# Patient Record
Sex: MALE | Race: WHITE | Employment: OTHER | URBAN - METROPOLITAN AREA
[De-identification: names, ages, dates, MRNs, and addresses within clinical notes are randomized per-mention and may not be internally consistent; named-entity substitution may affect disease eponyms.]

---

## 2017-01-04 ENCOUNTER — HOSPITAL ENCOUNTER (OUTPATIENT)
Dept: RESPIRATORY THERAPY | Age: 37
Discharge: HOME | End: 2017-01-04
Attending: NURSE PRACTITIONER

## 2017-01-04 DIAGNOSIS — J45.30 MILD PERSISTENT ASTHMA WITHOUT COMPLICATION: ICD-10-CM

## 2017-01-04 DIAGNOSIS — F17.200 CURRENT SMOKER: ICD-10-CM

## 2017-01-04 PROCEDURE — 94060 EVALUATION OF WHEEZING: CPT

## 2017-01-04 PROCEDURE — 94729 DIFFUSING CAPACITY: CPT

## 2017-01-04 PROCEDURE — 94726 PLETHYSMOGRAPHY LUNG VOLUMES: CPT

## 2017-01-10 ENCOUNTER — TELEPHONE (OUTPATIENT)
Dept: FAMILY MEDICINE CLINIC | Age: 37
End: 2017-01-10

## 2017-01-10 DIAGNOSIS — J45.30 MILD PERSISTENT ASTHMA WITHOUT COMPLICATION: ICD-10-CM

## 2017-01-10 DIAGNOSIS — J44.9 CHRONIC OBSTRUCTIVE PULMONARY DISEASE, UNSPECIFIED COPD TYPE (HCC): Primary | ICD-10-CM

## 2017-01-10 DIAGNOSIS — R06.02 SHORTNESS OF BREATH: ICD-10-CM

## 2017-01-10 DIAGNOSIS — F17.200 CURRENT SMOKER: ICD-10-CM

## 2017-01-10 RX ORDER — ALBUTEROL SULFATE 90 UG/1
2 AEROSOL, METERED RESPIRATORY (INHALATION)
Qty: 1 INHALER | Refills: 0 | Status: SHIPPED | COMMUNITY
Start: 2017-01-10 | End: 2017-01-26 | Stop reason: SDUPTHER

## 2017-01-10 RX ORDER — FLUTICASONE PROPIONATE AND SALMETEROL 250; 50 UG/1; UG/1
1 POWDER RESPIRATORY (INHALATION) EVERY 12 HOURS
Qty: 3 INHALER | Refills: 3 | Status: SHIPPED | COMMUNITY
Start: 2017-01-10 | End: 2018-10-02 | Stop reason: SDUPTHER

## 2017-01-10 RX ORDER — FLUTICASONE PROPIONATE AND SALMETEROL 250; 50 UG/1; UG/1
1 POWDER RESPIRATORY (INHALATION) EVERY 12 HOURS
Qty: 1 INHALER | Refills: 0 | Status: SHIPPED | COMMUNITY
Start: 2017-01-10 | End: 2017-01-26 | Stop reason: SDUPTHER

## 2017-01-10 RX ORDER — ALBUTEROL SULFATE 90 UG/1
2 AEROSOL, METERED RESPIRATORY (INHALATION)
Qty: 3 INHALER | Refills: 3 | Status: SHIPPED | COMMUNITY
Start: 2017-01-10 | End: 2018-10-02 | Stop reason: SDUPTHER

## 2017-01-10 NOTE — PROGRESS NOTES
Kaci Kirkland,  Please call patient with the following:  Pt has COPD. Lung disease. Encourage pt to quit smoking. Will order advair and proventil via [facility] as sample and as a program medication. Pt needs to come into the office to sign [facility] paperwork and to  samples.

## 2017-01-19 ENCOUNTER — TELEPHONE (OUTPATIENT)
Dept: FAMILY MEDICINE CLINIC | Age: 37
End: 2017-01-19

## 2017-01-26 NOTE — TELEPHONE ENCOUNTER
Medication: Ventolin HFA, dose: 2 puffs, how often: every 4 hours as needed, current number of medication days provided: 90, refill per application. Lot #: [id number], EXP.04/18. This medication was received and verified for the following 1. Correct Patient, 2. Correct Diagnosis, 3. Correct Drug, 4. Correct route, and no current allergy to medication. Medication: Lovaza 1gm, dose: 2 caps, how often: qd, current number of medication days provided: 90, refill per application. Lot #: [id number], EXP.01/18. This medication was received and verified for the following 1. Correct Patient, 2. Correct Diagnosis, 3. Correct Drug, 4. Correct route, and no current allergy to medication. Please contact patient to come  their medications.      Desirae Hartman, MSN,RN,[facility]

## 2017-03-29 ENCOUNTER — HOSPITAL ENCOUNTER (OUTPATIENT)
Dept: LAB | Age: 37
Discharge: HOME | End: 2017-03-29

## 2017-03-29 ENCOUNTER — LAB ONLY (OUTPATIENT)
Dept: FAMILY MEDICINE CLINIC | Age: 37
End: 2017-03-29

## 2017-03-29 DIAGNOSIS — Z91.14 NONCOMPLIANCE WITH MEDICATION REGIMEN: Primary | ICD-10-CM

## 2017-03-29 DIAGNOSIS — E78.5 DYSLIPIDEMIA: ICD-10-CM

## 2017-03-29 LAB
CHOLEST SERPL-MCNC: 188 MG/DL
HDLC SERPL-MCNC: 55 MG/DL (ref 40–60)
HDLC SERPL: 3.4 {RATIO} (ref 0–5)
LDLC SERPL CALC-MCNC: 104 MG/DL (ref 0–100)
LIPID PROFILE,FLP: ABNORMAL
TRIGL SERPL-MCNC: 145 MG/DL (ref ?–150)
VLDLC SERPL CALC-MCNC: 29 MG/DL

## 2017-03-29 PROCEDURE — 80061 LIPID PANEL: CPT | Performed by: NURSE PRACTITIONER

## 2017-03-29 NOTE — PROGRESS NOTES
Pt. Name,  and orders verified before specimen collection. Site prepped using aseptic procedure. 23 gauge butterfly was utilized to collect specimen. Labs drawn in RT arm x's 1 attempts. Site benign no bleeding noted. Band-Aid and 2x2 applied. Pt tolerated procedure well.

## 2017-04-04 ENCOUNTER — TELEPHONE (OUTPATIENT)
Dept: FAMILY MEDICINE CLINIC | Age: 37
End: 2017-04-04

## 2017-04-04 NOTE — TELEPHONE ENCOUNTER
Jana, please call pt and have him reschedule his appt for next week or the week after. He had labs done and they need to be reviewed with him. Please remind him of the No Show policy.  Thank you

## 2017-04-10 ENCOUNTER — TELEPHONE (OUTPATIENT)
Dept: FAMILY MEDICINE CLINIC | Age: 37
End: 2017-04-10

## 2017-04-12 ENCOUNTER — OFFICE VISIT (OUTPATIENT)
Dept: FAMILY MEDICINE CLINIC | Age: 37
End: 2017-04-12

## 2017-04-12 VITALS
DIASTOLIC BLOOD PRESSURE: 80 MMHG | HEART RATE: 75 BPM | BODY MASS INDEX: 20.38 KG/M2 | SYSTOLIC BLOOD PRESSURE: 126 MMHG | RESPIRATION RATE: 16 BRPM | TEMPERATURE: 98.3 F | OXYGEN SATURATION: 98 % | WEIGHT: 145.6 LBS | HEIGHT: 71 IN

## 2017-04-12 DIAGNOSIS — M79.642 PAIN IN LEFT HAND: Primary | ICD-10-CM

## 2017-04-12 DIAGNOSIS — K02.9 DENTAL CARIES: ICD-10-CM

## 2017-04-12 DIAGNOSIS — R06.02 SHORTNESS OF BREATH: ICD-10-CM

## 2017-04-12 DIAGNOSIS — K04.7 TOOTH ABSCESS: ICD-10-CM

## 2017-04-12 DIAGNOSIS — E78.1 HYPERTRIGLYCERIDEMIA: ICD-10-CM

## 2017-04-12 DIAGNOSIS — F17.200 CURRENT SMOKER: ICD-10-CM

## 2017-04-12 RX ORDER — OMEGA-3-ACID ETHYL ESTERS 1 G/1
1 CAPSULE, LIQUID FILLED ORAL
Qty: 90 CAP | Refills: 3 | Status: SHIPPED | COMMUNITY
Start: 2017-04-12 | End: 2019-02-05

## 2017-04-12 RX ORDER — AMOXICILLIN 500 MG/1
500 CAPSULE ORAL 2 TIMES DAILY
Qty: 20 CAP | Refills: 0 | Status: SHIPPED | OUTPATIENT
Start: 2017-04-12 | End: 2017-04-22

## 2017-04-12 NOTE — MR AVS SNAPSHOT
Visit Information Date & Time Provider Department Dept. Phone Encounter #  
 4/12/2017 10:30 AM Vale Diaz NP 1997 [facility] [id number] Follow-up Instructions Return in about 3 months (around 7/12/2017). Upcoming Health Maintenance Date Due Pneumococcal 19-64 Medium Risk (1 of 1 - PPSV23) 12/7/1999 DTaP/Tdap/Td series (1 - Tdap) 12/7/2001 INFLUENZA AGE 9 TO ADULT 8/1/2016 Allergies as of 4/12/2017  Review Complete On: 4/12/2017 By: Ana Remy No Known Allergies Current Immunizations  Never Reviewed No immunizations on file. Not reviewed this visit You Were Diagnosed With   
  
 Codes Comments Pain in left hand    -  Primary ICD-10-CM: J09.643 ICD-9-CM: 729.5 Hypertriglyceridemia     ICD-10-CM: E78.1 ICD-9-CM: 272.1 Tooth abscess     ICD-10-CM: K04.7 ICD-9-CM: 522.5 Vitals BP Pulse Temp Resp Height(growth percentile) Weight(growth percentile) 126/80 75 98.3 °F (36.8 °C) 16 5' 11\" (1.803 m) 145 lb 9.6 oz (66 kg) SpO2 BMI Smoking Status 98% 20.31 kg/m2 Current Every Day Smoker BMI and BSA Data Body Mass Index Body Surface Area  
 20.31 kg/m 2 1.82 m 2 Preferred Pharmacy Pharmacy Name Phone [facility] - [address] [phone] Your Updated Medication List  
  
   
This list is accurate as of: 4/12/17 11:18 AM.  Always use your most recent med list.  
  
  
  
  
 albuterol 90 mcg/actuation inhaler Commonly known as:  PROVENTIL HFA, VENTOLIN HFA, PROAIR HFA Take 2 Puffs by inhalation every four (4) hours as needed for Wheezing or Shortness of Breath. amoxicillin 500 mg capsule Commonly known as:  AMOXIL Take 1 Cap by mouth two (2) times a day for 10 days. Tooth abscess  
  
 fluticasone-salmeterol 250-50 mcg/dose diskus inhaler Commonly known as:  ADVAIR  
 Take 1 Puff by inhalation every twelve (12) hours. omega-3 acid ethyl esters 1 gram capsule Commonly known as:  Desi Grant Take 1 Cap by mouth daily (with breakfast). For triglycerides VITAMIN D3 1,000 unit Cap Generic drug:  cholecalciferol Take  by mouth. Prescriptions Printed Refills  
 omega-3 acid ethyl esters (LOVAZA) 1 gram capsule 3 Sig: Take 1 Cap by mouth daily (with breakfast). For triglycerides Class: Program  
 Route: Oral  
  
Prescriptions Sent to Mail Order Refills  
 omega-3 acid ethyl esters (LOVAZA) 1 gram capsule 3 Sig: Take 1 Cap by mouth daily (with breakfast). For triglycerides Class: Program  
 Pharmacy: [address] [place] [address] [facility]. Ph #: [phone] Route: Oral  
  
Prescriptions Sent to Pharmacy Refills  
 omega-3 acid ethyl esters (LOVAZA) 1 gram capsule 3 Sig: Take 1 Cap by mouth daily (with breakfast). For triglycerides Class: Program  
 Pharmacy: [address] [place] [address] [facility]. Ph #: [phone] Route: Oral  
 amoxicillin (AMOXIL) 500 mg capsule 0 Sig: Take 1 Cap by mouth two (2) times a day for 10 days. Tooth abscess Class: Normal  
 Pharmacy: [address] [place] [address] [place], [facility]. Ph #: [phone] Route: Oral  
  
Follow-up Instructions Return in about 3 months (around 7/12/2017). To-Do List   
 04/12/2017 Imaging:  XR HAND LT MIN 3 V Patient Instructions [facility] reminders! Foundation Operating Hours: These may change without notice. Mon- Wed 7am to 5pm. Closed for lunch 12-1pm 
Thurs 7am to 12pm 
Fridays closed **In case of inclement weather (snow and ice) please do not try to come into the office for your appointment. Please call in and you will not be held as a [employer] \" SAFETY FIRST!!** 
 
NO SHOW POLICY ~ If a patient has 3 no shows for an appointment with the Provider, Mental Health Provider, or the Nurse Navigator, they will be discharged from the practice for 12 months. Medication ordering will also be suspended. If the patient is discharged from the [facility], they can go to the [place] [address] where they can be seen for the primary needs plus obtain the same types of medications as they receive at the [facility]. To avoid being discharged, the patient must call the office at [phone] 24 hours prior to their appointment if they need to cancel, arrive to their appointments on time and come to all scheduled appointments. If the patient is discharged from the practice, they can apply to be re-established after 12 months. Lab work:  Unless you are instructed differently, please return to the office between the hours of 7 am and 10:30 am Monday through Thursday to have your labs drawn one week before your next scheduled PROVIDER visit. If you do not have an appointment to follow up on these results, please make one or plan to call the office if you do not hear from us to get the results. No news does not mean good news. Medications: If your medications are new or have changed, and you get your medications from the [address] [facility]), you MUST talk to the pharmacy staff to sign the new prescription applications. If you don't sign the applications we cannot get the medications for you. It usually takes 6-8 weeks for your medications to arrive. The Pharmacy staff will call you when your medications are available. You will have 30 days to come in and  your medications. If you don't  your medicines within those 30 days, those medicines may be placed on the self as samples and you will have to start all over again by completing the applications and waiting the 6-8 weeks for your medicines to arrive.  
 
 
 
The [facility] or [facility] will assist you with your medications if available but not all medications are available so some, not all medications, may be obtained through local pharmacies such as [facility] that has a large $4 list. We will work hard to get the best medications for you that you can also afford. Foot Care: [facility] through [facility] ([address]) Every second Tuesday of the month (except for holidays and election days) from 9am to 1 pm. The services provided by these ministry volunteers are free of charge with the option to donate. They will inspect your feet thoroughly, soak them for 10 minutes, cut and file your nails. They care for diabetics as well. Keep in mind this service is free and will be on a first come first serve basis. Bad teeth? Ask about the Dental Bus to get you in front of a local dentist. The bus leaves every other Wednesday for those on the list. (Ask about availability as these appointments are limited) DIABETES: Do you have uncontrolled diabetes or you just want to learn more about your diabetes? Schedule with the Nurse navigator for our new 5-week Diabetes program. You will learn how to properly manage your diabetes: nutrition, exercise, medication therapy. Eye exams for Diabetics. Please let us know so we can add you to the list to see the eye doctor at [facility]. These appointments are limited. You will receive a free eye exam and free glasses if needed. Unfortunately, if you are not a diabetic, we do not have a free service for eye exams for you (yet!). We do have information on where to go to get a huge discount on eye exams and glasses. Sick visits: If you are sick and it is not an emergency call the office to see if you can schedule an appointment. Charges and cost items from the Foundation:  Most of our orders are covered by [employer] but there ARE SOME CHARGES for items such as radiology interpretations and anesthesiology during procedures and surgeries.   Please make sure you have contacted the Advanced Patient Advocacy (APA) group to check on your payment options: [email or web address]. APA is available Mon - Fri 8-4pm at [facility] on the first floor by the information desk. Their number is [phone]. It is important that you are screened in order to qualify for assistance and to avoid huge medical charges. The [facility] is not responsible for ANY charges you may accrue regardless of who ordered the medication, procedure, treatment or test. If you go to the Emergency Room, you WILL be charged! Behavior and emotional issues! It is stressful to be sick, have an illness, take medications, not have a job, not have medical insurance, have family issues or just getting older! Schedule an appointment with our mental health provider. She is in the office Mondays and Wednesdays from 8am to [place] [address] can also contact the following: The national suicide hotline (8-442-775-LRWF or 5-537.702.8294) [address] 
[phone] Community Services Board (CSB) [facility] [address] [facility], [address] 
[phone] Drug and Alcohol Addiction Issues! It is hard to stop a poor habit but there is help out there. Please feel free to attend any other the following support groups to help you kick the habit or go to [facility] Emergency Department to be evaluated by the psychiatric team. Never give up!! AlAnon meetings: Koby dooley K[place], [place] MONDAY 7:30 PM JUST FOR TODAY [employer] [facility] [place] WEDNESDAY 10:30 AM [employer] Bo [place] [employer] [facility], ROOM [address] [place] [address]  
 
[place] WEDNESDAY 8:00 PM Kevin Neely 1997 [place] THURSDAY 8:00 PM [employer]EEP [employer]PLE [employer] Bo [facility] [address] [facility] 8:00 PM LET IT BEGIN WITH ME [employer] Bo CANO [facility] [address] [place] 6;30 PM [place] PARENTS [employer] Parents OAK [facility] [address]  [place] MONDAY 10:30  [place] [address] [place] SATURDAY 8:00 PM SPENSER [facility] SATURDAY NIGHT [employer] [place] [address] [place] MONDAY 7:00 PM MONDAY NIGHT AFG Al-Anon JUAN [facility] [address]  
 
[facility] PM [employer] Al-[facility]on [facility] [address]  Amoxicillin (By mouth) Amoxicillin (w-ogp-w-FREDDIE-in) Treats infections or stomach ulcers. This medicine is a penicillin antibiotic. Brand Name(s):Amoxicot, Amoxil, Amoxil Pediatric, Moxatag, Omeclamox-Nathaniel, Prevpac, Trimox, Triple Therapy There may be other brand names for this medicine. When This Medicine Should Not Be Used: This medicine is not right for everyone. You should not use it if you had an allergic reaction to amoxicillin, any type of penicillin, or a cephalosporin antibiotic. How to Use This Medicine:  
Capsule, Liquid, Tablet, Chewable Tablet, Long Acting Tablet · Your doctor will tell you how much medicine to use. Do not use more than directed. · Chewable tablet: You must chew the tablet before you swallow it. You may crush the tablet and mix the medicine with a small amount of food to make it easier to swallow. · Oral liquid: Shake well just before each use. · Measure the oral liquid medicine with a marked measuring spoon, oral syringe, or medicine cup. You may mix the oral liquid with a baby formula, milk, fruit juice, water, ginger ale, or another cold drink. Be sure your child drinks all of the mixture right away. · Tablet for suspension: Place the tablet in a small drinking glass, and add 2 teaspoons of water. Do not use any other liquid. Gently stir or swirl the water in the glass until the tablet is completely dissolved. Drink all of this mixture right away. Add more water to the glass and drink all of it to make sure you get all of the medicine. Do not chew or swallow the tablet for suspension. · Take all of the medicine in your prescription to clear up your infection, even if you feel better after the first few doses. · Take a dose as soon as you remember. If it is almost time for your next dose, wait until then and take a regular dose. Do not take extra medicine to make up for a missed dose. · Store the tablets, capsules, and tablets for suspension at room temperature, away from heat, moisture, and direct light. · Store the oral liquid in the refrigerator. Do not freeze. Throw away any unused medicine after 14 days. Drugs and Foods to Avoid: Ask your doctor or pharmacist before using any other medicine, including over-the-counter medicines, vitamins, and herbal products. · Some medicines can affect how amoxicillin works. Tell your doctor if you are also using any of the following: ¨ Allopurinol ¨ Probenecid ¨ Birth control pills ¨ A blood thinner Warnings While Using This Medicine: · Tell your doctor if you are pregnant or breastfeeding, or if you have kidney disease, allergies, or a condition called phenylketonuria (PKU). Tell your doctor if you are on dialysis. · This medicine can cause diarrhea. Call your doctor if the diarrhea becomes severe, does not stop, or is bloody. Do not take any medicine to stop diarrhea until you have talked to your doctor. Diarrhea can occur 2 months or more after you stop taking this medicine. · Tell any doctor or dentist who treats you that you are using this medicine. This medicine may affect certain medical test results. · Call your doctor if your symptoms do not improve or if they get worse. · Use this medicine to treat only the infection your doctor has prescribed it for.  Do not use this medicine for any infection or condition that has not been checked by a doctor. This medicine will not treat the flu or the common cold. · Keep all medicine out of the reach of children. Never share your medicine with anyone. Possible Side Effects While Using This Medicine:  
Call your doctor right away if you notice any of these side effects: · Allergic reaction: Itching or hives, swelling in your face or hands, swelling or tingling in your mouth or throat, chest tightness, trouble breathing · Blistering, peeling, or red skin rash · Diarrhea that may contain blood, stomach cramps, fever If you notice these less serious side effects, talk with your doctor: · Mild diarrhea, nausea, or vomiting · Mild skin rash If you notice other side effects that you think are caused by this medicine, tell your doctor. Call your doctor for medical advice about side effects. You may report side effects to FDA at 5-502-FDA-2589 © 2016 [address] is for End User's use only and may not be sold, redistributed or otherwise used for commercial purposes. The above information is an  only. It is not intended as medical advice for individual conditions or treatments. Talk to your doctor, nurse or pharmacist before following any medical regimen to see if it is safe and effective for you. Amoxicillin (By mouth) Amoxicillin (l-ugl-b-FREDDIE-in) Treats infections or stomach ulcers. This medicine is a penicillin antibiotic. Brand Name(s):Amoxicot, Amoxil, Amoxil Pediatric, Moxatag, Omeclamox-Nathaniel, Prevpac, Trimox, Triple Therapy There may be other brand names for this medicine. When This Medicine Should Not Be Used: This medicine is not right for everyone. You should not use it if you had an allergic reaction to amoxicillin, any type of penicillin, or a cephalosporin antibiotic. How to Use This Medicine:  
Capsule, Liquid, Tablet, Chewable Tablet, Long Acting Tablet · Your doctor will tell you how much medicine to use. Do not use more than directed. · Chewable tablet: You must chew the tablet before you swallow it. You may crush the tablet and mix the medicine with a small amount of food to make it easier to swallow. · Oral liquid: Shake well just before each use. · Measure the oral liquid medicine with a marked measuring spoon, oral syringe, or medicine cup. You may mix the oral liquid with a baby formula, milk, fruit juice, water, ginger ale, or another cold drink. Be sure your child drinks all of the mixture right away. · Tablet for suspension: Place the tablet in a small drinking glass, and add 2 teaspoons of water. Do not use any other liquid. Gently stir or swirl the water in the glass until the tablet is completely dissolved. Drink all of this mixture right away. Add more water to the glass and drink all of it to make sure you get all of the medicine. Do not chew or swallow the tablet for suspension. · Take all of the medicine in your prescription to clear up your infection, even if you feel better after the first few doses. · Take a dose as soon as you remember. If it is almost time for your next dose, wait until then and take a regular dose. Do not take extra medicine to make up for a missed dose. · Store the tablets, capsules, and tablets for suspension at room temperature, away from heat, moisture, and direct light. · Store the oral liquid in the refrigerator. Do not freeze. Throw away any unused medicine after 14 days. Drugs and Foods to Avoid: Ask your doctor or pharmacist before using any other medicine, including over-the-counter medicines, vitamins, and herbal products. · Some medicines can affect how amoxicillin works. Tell your doctor if you are also using any of the following: ¨ Allopurinol ¨ Probenecid ¨ Birth control pills ¨ A blood thinner Warnings While Using This Medicine: · Tell your doctor if you are pregnant or breastfeeding, or if you have kidney disease, allergies, or a condition called phenylketonuria (PKU). Tell your doctor if you are on dialysis. · This medicine can cause diarrhea. Call your doctor if the diarrhea becomes severe, does not stop, or is bloody. Do not take any medicine to stop diarrhea until you have talked to your doctor. Diarrhea can occur 2 months or more after you stop taking this medicine. · Tell any doctor or dentist who treats you that you are using this medicine. This medicine may affect certain medical test results. · Call your doctor if your symptoms do not improve or if they get worse. · Use this medicine to treat only the infection your doctor has prescribed it for. Do not use this medicine for any infection or condition that has not been checked by a doctor. This medicine will not treat the flu or the common cold. · Keep all medicine out of the reach of children. Never share your medicine with anyone. Possible Side Effects While Using This Medicine:  
Call your doctor right away if you notice any of these side effects: · Allergic reaction: Itching or hives, swelling in your face or hands, swelling or tingling in your mouth or throat, chest tightness, trouble breathing · Blistering, peeling, or red skin rash · Diarrhea that may contain blood, stomach cramps, fever If you notice these less serious side effects, talk with your doctor: · Mild diarrhea, nausea, or vomiting · Mild skin rash If you notice other side effects that you think are caused by this medicine, tell your doctor. Call your doctor for medical advice about side effects. You may report side effects to FDA at 5-566-FDA-7729 © 2016 [address] is for End User's use only and may not be sold, redistributed or otherwise used for commercial purposes. The above information is an  only.  It is not intended as medical advice for individual conditions or treatments. Talk to your doctor, nurse or pharmacist before following any medical regimen to see if it is safe and effective for you. Abscessed Tooth: Care Instructions Your Care Instructions An abscessed tooth is a tooth that has a pocket of pus in the tissues around it. Pus forms when the body tries to fight an infection caused by bacteria. If the pus cannot drain, it forms an abscess. An abscessed tooth can cause red, swollen gums and throbbing pain, especially when you chew. You may have a bad taste in your mouth and a fever, and your jaw may swell. Damage to the tooth, untreated tooth decay, or gum disease can cause an abscessed tooth. An abscessed tooth needs to be treated by a dental professional right away. If it is not treated, the infection could spread to other parts of your body. Your dentist will give you antibiotics to stop the infection. He or she may make a hole in the tooth or cut open (haydee) the abscess inside your mouth so that the infection can drain, which should relieve your pain. You may need to have a root canal treatment, which tries to save your tooth by taking out the infected pulp and replacing it with a healing medicine and/or a filling. If these treatments do not work, your tooth may have to be removed. Follow-up care is a key part of your treatment and safety. Be sure to make and go to all appointments, and call your doctor if you are having problems. It's also a good idea to know your test results and keep a list of the medicines you take. How can you care for yourself at home? · Reduce pain and swelling in your face and jaw by putting ice or a cold pack on the outside of your cheek for 10 to 20 minutes at a time. Put a thin cloth between the ice and your skin. · Take pain medicines exactly as directed. ¨ If the doctor gave you a prescription medicine for pain, take it as prescribed. ¨ If you are not taking a prescription pain medicine, ask your doctor if you can take an over-the-counter medicine. · Take your antibiotics as directed. Do not stop taking them just because you feel better. You need to take the full course of antibiotics. To prevent tooth abscess · Brush and floss every day, and have regular dental checkups. · Eat a healthy diet, and avoid sugary foods and drinks. · Do not smoke, use e-cigarettes with nicotine, or use spit tobacco. Tobacco and nicotine slow your ability to heal. Tobacco also increases your risk for gum disease and cancer of the mouth and throat. If you need help quitting, talk to your doctor about stop-smoking programs and medicines. These can increase your chances of quitting for good. When should you call for help? Call 911 anytime you think you may need emergency care. For example, call if: 
· You have trouble breathing. Call your doctor now or seek immediate medical care if: 
· You are dizzy or lightheaded, or you feel like you may faint. · You have a new or higher fever. · You have swelling, redness, or pain that spreads or gets worse. · You have pus coming from the tooth area. · You have an earache or pain behind your ear. · You have a fever with a stiff neck or a severe headache. · You are sensitive to light or feel very sleepy or confused. Watch closely for changes in your health, and be sure to contact your doctor if: 
· You do not get better as expected. Where can you learn more? Go to http://neptali-abhinav.info/. Enter G837 in the search box to learn more about \"Abscessed Tooth: Care Instructions. \" Current as of: September 19, 2016 Content Version: 11.2 © 8017-4592 [employer]. Care instructions adapted under license by [employer] (which disclaims liability or warranty for this information).  If you have questions about a medical condition or this instruction, always ask your healthcare professional. [place] [address] any warranty or liability for your use of this information. Introducing [facility] SERVICES! [employer] introduces [employer] patient portal. Now you can access parts of your medical record, email your doctor's office, and request medication refills online. 1. In your internet browser, go to [email or web address] 2. Click on the First Time User? Click Here link in the Sign In box. You will see the New Member Sign Up page. 3. Enter your [employer] Access Code exactly as it appears below. You will not need to use this code after youve completed the sign-up process. If you do not sign up before the expiration date, you must request a new code. · [employer] Access Code: [email or web address] Expires: 7/11/2017 11:18 AM 
 
4. Enter the last four digits of your Social Security Number (xxxx) and Date of Birth (mm/dd/yyyy) as indicated and click Submit. You will be taken to the next sign-up page. 5. Create a [employer] ID. This will be your [employer] login ID and cannot be changed, so think of one that is secure and easy to remember. 6. Create a [employer] password. You can change your password at any time. 7. Enter your Password Reset Question and Answer. This can be used at a later time if you forget your password. 8. Enter your e-mail address. You will receive e-mail notification when new information is available in [address] 9. Click Sign Up. You can now view and download portions of your medical record. 10. Click the Download Summary menu link to download a portable copy of your medical information. If you have questions, please visit the Frequently Asked Questions section of the [employer] website. Remember, [employer] is NOT to be used for urgent needs. For medical emergencies, dial 911. Now available from your iPhone and Android! Please provide this summary of care documentation to your next provider. Your primary care clinician is listed as Lionel Galicia. If you have any questions after today's visit, please call [phone].

## 2017-04-12 NOTE — PATIENT INSTRUCTIONS
[facility] reminders! Foundation Operating Hours: These may change without notice. Mon- Wed 7am to 5pm. Closed for lunch 12-1pm  Thurs 7am to 12pm  Fridays closed     **In case of inclement weather (snow and ice) please do not try to come into the office for your appointment. Please call in and you will not be held as a [employer]. \" SAFETY FIRST!!**    NO SHOW POLICY ~ If a patient has 3 no shows for an appointment with the Provider, Mental Health Provider, or the Nurse Navigator, they will be discharged from the practice for 12 months. Medication ordering will also be suspended. If the patient is discharged from the [place], they can go to the [place] [address] where they can be seen for the primary needs plus obtain the same types of medications as they receive at the [place]. To avoid being discharged, the patient must call the office at [phone] 24 hours prior to their appointment if they need to cancel, arrive to their appointments on time and come to all scheduled appointments. If the patient is discharged from the [facility], they can apply to be re-established after 12 months. Lab work:  Unless you are instructed differently, please return to the office between the hours of 7 am and 10:30 am Monday through Thursday to have your labs drawn one week before your next scheduled PROVIDER visit. If you do not have an appointment to follow up on these results, please make one or plan to call the office if you do not hear from us to get the results. No news does not mean good news. Medications: If your medications are new or have changed, and you get your medications from the [facility] [address] [place] [address]), you MUST talk to the pharmacy staff to sign the new prescription applications. If you don't sign the applications we cannot get the medications for you. It usually takes 6-8 weeks for your medications to arrive. The Pharmacy staff will call you when your medications are available.  You will have 30 days to come in and  your medications. If you don't  your medicines within those 30 days, those medicines may be placed on the self as samples and you will have to start all over again by completing the applications and waiting the 6-8 weeks for your medicines to arrive. The [facility] or [facility] will assist you with your medications if available but not all medications are available so some, not all medications, may be obtained through local pharmacies such as [facility] that has a large $4 list. We will work hard to get the best medications for you that you can also afford. Foot Care: Local [facility] through [facility] ([address] [facility])  Every second Tuesday of the month (except for holidays and election days) from 9am to 1 pm. The services provided by these ministry volunteers are free of charge with the option to donate. They will inspect your feet thoroughly, soak them for 10 minutes, cut and file your nails. They care for diabetics as well. Keep in mind this service is free and will be on a first come first serve basis. Bad teeth? Ask about the Dental Bus to get you in front of a local dentist. The bus leaves every other Wednesday for those on the list. (Ask about availability as these appointments are limited)    DIABETES: Do you have uncontrolled diabetes or you just want to learn more about your diabetes? Schedule with the Nurse navigator for our new 5-week Diabetes program. You will learn how to properly manage your diabetes: nutrition, exercise, medication therapy. Eye exams for Diabetics. Please let us know so we can add you to the list to see the eye doctor at [facility]. These appointments are limited. You will receive a free eye exam and free glasses if needed. Unfortunately, if you are not a diabetic, we do not have a free service for eye exams for you (yet!). We do have information on where to go to get a huge discount on eye exams and glasses.     Ángel Merino visits: If you are sick and it is not an emergency call the office to see if you can schedule an appointment. Charges and cost items from the Foundation:  Most of our orders are covered by [address] but there ARE SOME CHARGES for items such as radiology interpretations and anesthesiology during procedures and surgeries. Please make sure you have contacted the Advanced Patient Advocacy (APA) group to check on your payment options: [email or web address]. APA is available Mon - Fri 8-4pm at DR. [facility] on the first floor by the information desk. Their number is [phone]. It is important that you are screened in order to qualify for assistance and to avoid huge medical charges. The [facility] is not responsible for ANY charges you may accrue regardless of who ordered the medication, procedure, treatment or test. If you go to the Emergency Room, you WILL be charged! Behavior and emotional issues! It is stressful to be sick, have an illness, take medications, not have a job, not have medical insurance, have family issues or just getting older! Schedule an appointment with our mental health provider. She is in the office Mondays and Wednesdays from 8am to [address] [facility] can also contact the following: The national suicide hotline (7-227-547-WCXS or 0-880.259.2271)    [address], [address] [place] [address] [facility]   [phone]    Drug and Alcohol Addiction Issues! It is hard to stop a poor habit but there is help out there. Please feel free to attend any other the following support groups to help you kick the habit or go to [facility] to be evaluated by the psychiatric team. Never give up!!     Esther meetings: Kaitlin Orellana, [address] [facility] 7:30 PM JUST FOR TODAY ABIODUN Soriano [address]     [place] WEDNESDAY 10:30 AM [employer] [place] [employer] [facility], ROOM [address] [place] [address]     [place] WEDNESDAY 8:00 PM [employer] [address] [facility] 8:00 PM [employer]PLE [employer]on [facility] 1 [employer] POWERS     [facility]SD[facility] 8:00 PM LET IT BEGIN WITH ME [employer] [facility] [address] [facility] FRIDAY 6;30 PM [place] PARENTS [employer] [facility] [address]      [place] MONDAY 10:30 AM [employer] [facility] [address] [facility] SATURDAY 8:00 PM [facility] SATURDAY NIGHT [employer] Al-[employer]on [facility] [address] [facility] MONDAY 7:00 PM MONDAY NIGHT [employer] [facility] [address]     [place] WEDNESDAY 8:00 PM [employer]on [facility] [address]         Amoxicillin (By mouth)   Amoxicillin (p-uzj-o-FREDDIE-in)  Treats infections or stomach ulcers. This medicine is a penicillin antibiotic. Brand Name(s):Amoxicot, Amoxil, Amoxil Pediatric, Moxatag, Omeclamox-Nathaniel, Prevpac, Trimox, Triple Therapy   There may be other brand names for this medicine. When This Medicine Should Not Be Used: This medicine is not right for everyone. You should not use it if you had an allergic reaction to amoxicillin, any type of penicillin, or a cephalosporin antibiotic. How to Use This Medicine:   Capsule, Liquid, Tablet, Chewable Tablet, Long Acting Tablet  · Your doctor will tell you how much medicine to use. Do not use more than directed. · Chewable tablet: You must chew the tablet before you swallow it. You may crush the tablet and mix the medicine with a small amount of food to make it easier to swallow. · Oral liquid: Shake well just before each use.   · Measure the oral liquid medicine with a marked measuring spoon, oral syringe, or medicine cup. You may mix the oral liquid with a baby formula, milk, fruit juice, water, ginger ale, or another cold drink. Be sure your child drinks all of the mixture right away. · Tablet for suspension: Place the tablet in a small drinking glass, and add 2 teaspoons of water. Do not use any other liquid. Gently stir or swirl the water in the glass until the tablet is completely dissolved. Drink all of this mixture right away. Add more water to the glass and drink all of it to make sure you get all of the medicine. Do not chew or swallow the tablet for suspension. · Take all of the medicine in your prescription to clear up your infection, even if you feel better after the first few doses. · Take a dose as soon as you remember. If it is almost time for your next dose, wait until then and take a regular dose. Do not take extra medicine to make up for a missed dose. · Store the tablets, capsules, and tablets for suspension at room temperature, away from heat, moisture, and direct light. · Store the oral liquid in the refrigerator. Do not freeze. Throw away any unused medicine after 14 days. Drugs and Foods to Avoid:   Ask your doctor or pharmacist before using any other medicine, including over-the-counter medicines, vitamins, and herbal products. · Some medicines can affect how amoxicillin works. Tell your doctor if you are also using any of the following:   ¨ Allopurinol  ¨ Probenecid  ¨ Birth control pills  ¨ A blood thinner  Warnings While Using This Medicine:   · Tell your doctor if you are pregnant or breastfeeding, or if you have kidney disease, allergies, or a condition called phenylketonuria (PKU). Tell your doctor if you are on dialysis. · This medicine can cause diarrhea. Call your doctor if the diarrhea becomes severe, does not stop, or is bloody. Do not take any medicine to stop diarrhea until you have talked to your doctor.  Diarrhea can occur 2 months or more after you stop taking this medicine. · Tell any doctor or dentist who treats you that you are using this medicine. This medicine may affect certain medical test results. · Call your doctor if your symptoms do not improve or if they get worse. · Use this medicine to treat only the infection your doctor has prescribed it for. Do not use this medicine for any infection or condition that has not been checked by a doctor. This medicine will not treat the flu or the common cold. · Keep all medicine out of the reach of children. Never share your medicine with anyone. Possible Side Effects While Using This Medicine:   Call your doctor right away if you notice any of these side effects:  · Allergic reaction: Itching or hives, swelling in your face or hands, swelling or tingling in your mouth or throat, chest tightness, trouble breathing  · Blistering, peeling, or red skin rash  · Diarrhea that may contain blood, stomach cramps, fever  If you notice these less serious side effects, talk with your doctor:   · Mild diarrhea, nausea, or vomiting  · Mild skin rash  If you notice other side effects that you think are caused by this medicine, tell your doctor. Call your doctor for medical advice about side effects. You may report side effects to FDA at 1-090-FDA-6808  © 2016 [address] is for End User's use only and may not be sold, redistributed or otherwise used for commercial purposes. The above information is an  only. It is not intended as medical advice for individual conditions or treatments. Talk to your doctor, nurse or pharmacist before following any medical regimen to see if it is safe and effective for you. Amoxicillin (By mouth)   Amoxicillin (s-rio-g-FREDDIE-in)  Treats infections or stomach ulcers. This medicine is a penicillin antibiotic.    Brand Name(s):Amoxicot, Amoxil, Amoxil Pediatric, Moxatag, Omeclamox-Nathaniel, Prevpac, Trimox, Triple Therapy   There may be other brand names for this medicine. When This Medicine Should Not Be Used: This medicine is not right for everyone. You should not use it if you had an allergic reaction to amoxicillin, any type of penicillin, or a cephalosporin antibiotic. How to Use This Medicine:   Capsule, Liquid, Tablet, Chewable Tablet, Long Acting Tablet  · Your doctor will tell you how much medicine to use. Do not use more than directed. · Chewable tablet: You must chew the tablet before you swallow it. You may crush the tablet and mix the medicine with a small amount of food to make it easier to swallow. · Oral liquid: Shake well just before each use. · Measure the oral liquid medicine with a marked measuring spoon, oral syringe, or medicine cup. You may mix the oral liquid with a baby formula, milk, fruit juice, water, ginger ale, or another cold drink. Be sure your child drinks all of the mixture right away. · Tablet for suspension: Place the tablet in a small drinking glass, and add 2 teaspoons of water. Do not use any other liquid. Gently stir or swirl the water in the glass until the tablet is completely dissolved. Drink all of this mixture right away. Add more water to the glass and drink all of it to make sure you get all of the medicine. Do not chew or swallow the tablet for suspension. · Take all of the medicine in your prescription to clear up your infection, even if you feel better after the first few doses. · Take a dose as soon as you remember. If it is almost time for your next dose, wait until then and take a regular dose. Do not take extra medicine to make up for a missed dose. · Store the tablets, capsules, and tablets for suspension at room temperature, away from heat, moisture, and direct light. · Store the oral liquid in the refrigerator. Do not freeze. Throw away any unused medicine after 14 days.   Drugs and Foods to Avoid:   Ask your doctor or pharmacist before using any other medicine, including over-the-counter medicines, vitamins, and herbal products. · Some medicines can affect how amoxicillin works. Tell your doctor if you are also using any of the following:   ¨ Allopurinol  ¨ Probenecid  ¨ Birth control pills  ¨ A blood thinner  Warnings While Using This Medicine:   · Tell your doctor if you are pregnant or breastfeeding, or if you have kidney disease, allergies, or a condition called phenylketonuria (PKU). Tell your doctor if you are on dialysis. · This medicine can cause diarrhea. Call your doctor if the diarrhea becomes severe, does not stop, or is bloody. Do not take any medicine to stop diarrhea until you have talked to your doctor. Diarrhea can occur 2 months or more after you stop taking this medicine. · Tell any doctor or dentist who treats you that you are using this medicine. This medicine may affect certain medical test results. · Call your doctor if your symptoms do not improve or if they get worse. · Use this medicine to treat only the infection your doctor has prescribed it for. Do not use this medicine for any infection or condition that has not been checked by a doctor. This medicine will not treat the flu or the common cold. · Keep all medicine out of the reach of children. Never share your medicine with anyone. Possible Side Effects While Using This Medicine:   Call your doctor right away if you notice any of these side effects:  · Allergic reaction: Itching or hives, swelling in your face or hands, swelling or tingling in your mouth or throat, chest tightness, trouble breathing  · Blistering, peeling, or red skin rash  · Diarrhea that may contain blood, stomach cramps, fever  If you notice these less serious side effects, talk with your doctor:   · Mild diarrhea, nausea, or vomiting  · Mild skin rash  If you notice other side effects that you think are caused by this medicine, tell your doctor. Call your doctor for medical advice about side effects.  You may report side effects to FDA at 1-800-FDA-1088  © 2016 [address] is for End User's use only and may not be sold, redistributed or otherwise used for commercial purposes. The above information is an  only. It is not intended as medical advice for individual conditions or treatments. Talk to your doctor, nurse or pharmacist before following any medical regimen to see if it is safe and effective for you. Abscessed Tooth: Care Instructions  Your Care Instructions    An abscessed tooth is a tooth that has a pocket of pus in the tissues around it. Pus forms when the body tries to fight an infection caused by bacteria. If the pus cannot drain, it forms an abscess. An abscessed tooth can cause red, swollen gums and throbbing pain, especially when you chew. You may have a bad taste in your mouth and a fever, and your jaw may swell. Damage to the tooth, untreated tooth decay, or gum disease can cause an abscessed tooth. An abscessed tooth needs to be treated by a dental professional right away. If it is not treated, the infection could spread to other parts of your body. Your dentist will give you antibiotics to stop the infection. He or she may make a hole in the tooth or cut open (haydee) the abscess inside your mouth so that the infection can drain, which should relieve your pain. You may need to have a root canal treatment, which tries to save your tooth by taking out the infected pulp and replacing it with a healing medicine and/or a filling. If these treatments do not work, your tooth may have to be removed. Follow-up care is a key part of your treatment and safety. Be sure to make and go to all appointments, and call your doctor if you are having problems. It's also a good idea to know your test results and keep a list of the medicines you take. How can you care for yourself at home?   · Reduce pain and swelling in your face and jaw by putting ice or a cold pack on the outside of your cheek for 10 to 20 minutes at a time. Put a thin cloth between the ice and your skin. · Take pain medicines exactly as directed. ¨ If the doctor gave you a prescription medicine for pain, take it as prescribed. ¨ If you are not taking a prescription pain medicine, ask your doctor if you can take an over-the-counter medicine. · Take your antibiotics as directed. Do not stop taking them just because you feel better. You need to take the full course of antibiotics. To prevent tooth abscess  · Brush and floss every day, and have regular dental checkups. · Eat a healthy diet, and avoid sugary foods and drinks. · Do not smoke, use e-cigarettes with nicotine, or use spit tobacco. Tobacco and nicotine slow your ability to heal. Tobacco also increases your risk for gum disease and cancer of the mouth and throat. If you need help quitting, talk to your doctor about stop-smoking programs and medicines. These can increase your chances of quitting for good. When should you call for help? Call 911 anytime you think you may need emergency care. For example, call if:  · You have trouble breathing. Call your doctor now or seek immediate medical care if:  · You are dizzy or lightheaded, or you feel like you may faint. · You have a new or higher fever. · You have swelling, redness, or pain that spreads or gets worse. · You have pus coming from the tooth area. · You have an earache or pain behind your ear. · You have a fever with a stiff neck or a severe headache. · You are sensitive to light or feel very sleepy or confused. Watch closely for changes in your health, and be sure to contact your doctor if:  · You do not get better as expected. Where can you learn more? Go to http://neptali-abhinav.info/. Enter A926 in the search box to learn more about \"Abscessed Tooth: Care Instructions. \"  Current as of: September 19, 2016  Content Version: 11.2  © 7577-5211 [employer].  Care instructions adapted under license by [employer] (which disclaims liability or warranty for this information). If you have questions about a medical condition or this instruction, always ask your healthcare professional. [address] any warranty or liability for your use of this information.

## 2017-04-13 NOTE — TELEPHONE ENCOUNTER
Medication: Advair 250/50, dose: 1 inhalation, how often: twice daily, current number of medication days provided: 90, refill per application. Lot #: [id number], EXP.06/18. This medication was received and verified for the following 1. Correct Patient, 2. Correct Diagnosis, 3. Correct Drug, 4. Correct route, and no current allergy to medication. Please contact patient to come  their medications.      Aspen Abdul, MSN,RN,[facility]

## 2017-04-17 ENCOUNTER — DOCUMENTATION ONLY (OUTPATIENT)
Dept: FAMILY MEDICINE CLINIC | Age: 37
End: 2017-04-17

## 2017-04-27 ENCOUNTER — TELEPHONE (OUTPATIENT)
Dept: FAMILY MEDICINE CLINIC | Age: 37
End: 2017-04-27

## 2017-04-27 ENCOUNTER — DOCUMENTATION ONLY (OUTPATIENT)
Dept: FAMILY MEDICINE CLINIC | Age: 37
End: 2017-04-27

## 2017-04-27 NOTE — PROGRESS NOTES
Called Pt to see if he went to have LT Hand x rayed that was ordered 17. Pt says he was unable to go because he did not have a ride and will try to go next week.   Pt aware that the order will  tomorrow and will call to have it renewed if he can't make it tomorrow

## 2017-04-27 NOTE — TELEPHONE ENCOUNTER
PLEASE VERIFY THAT MEDICATION IS CORRECT. Patient will need to sign an application to replace the Ventolin with the Proventil.

## 2017-05-01 NOTE — TELEPHONE ENCOUNTER
Medication: Ventolin HFA, dose: 2 puffs, how often: every 4 hours as needed, current number of medication days provided: 90, refill per application. Lot #: [id number], EXP.05/18. This medication was received and verified for the following 1. Correct Patient, 2. Correct Diagnosis, 3. Correct Drug, 4. Correct route, and no current allergy to medication. Medication: Lovaza 1gm, dose: 1 caps, how often: qd, current number of medication days provided: 90, refill per application. Lot #: [id number], EXP.04/18. This medication was received and verified for the following 1. Correct Patient, 2. Correct Diagnosis, 3. Correct Drug, 4. Correct route, and no current allergy to medication. Please contact patient to come  their medications.      Bindu Friend, MSN,RN,[facility]

## 2017-07-25 ENCOUNTER — TELEPHONE (OUTPATIENT)
Dept: FAMILY MEDICINE CLINIC | Age: 37
End: 2017-07-25

## 2017-07-27 NOTE — TELEPHONE ENCOUNTER
Medication: Lovaza 1gm, dose: 1 caps, how often: qd, current number of medication days provided: 90, refill per application. Lot #: [id number], EXP.07/18. This medication was received and verified for the following 1. Correct Patient, 2. Correct Diagnosis, 3. Correct Drug, 4. Correct route, and no current allergy to medication. Please contact patient to come  their medications.      Katherine Carmen, MSN,RN,[facility]

## 2017-08-01 ENCOUNTER — TELEPHONE (OUTPATIENT)
Dept: FAMILY MEDICINE CLINIC | Age: 37
End: 2017-08-01

## 2017-08-02 ENCOUNTER — OFFICE VISIT (OUTPATIENT)
Dept: FAMILY MEDICINE CLINIC | Age: 37
End: 2017-08-02

## 2017-08-02 ENCOUNTER — HOSPITAL ENCOUNTER (OUTPATIENT)
Dept: GENERAL RADIOLOGY | Age: 37
Discharge: HOME | End: 2017-08-02
Payer: SELF-PAY

## 2017-08-02 VITALS
HEIGHT: 71 IN | DIASTOLIC BLOOD PRESSURE: 74 MMHG | HEART RATE: 63 BPM | OXYGEN SATURATION: 100 % | RESPIRATION RATE: 12 BRPM | BODY MASS INDEX: 20.05 KG/M2 | TEMPERATURE: 98.2 F | WEIGHT: 143.2 LBS | SYSTOLIC BLOOD PRESSURE: 149 MMHG

## 2017-08-02 DIAGNOSIS — J44.9 CHRONIC OBSTRUCTIVE PULMONARY DISEASE, UNSPECIFIED COPD TYPE (HCC): Primary | ICD-10-CM

## 2017-08-02 DIAGNOSIS — B07.0 PLANTAR WART OF BOTH FEET: ICD-10-CM

## 2017-08-02 DIAGNOSIS — M79.642 PAIN OF LEFT HAND: ICD-10-CM

## 2017-08-02 DIAGNOSIS — F17.200 CURRENT SMOKER: ICD-10-CM

## 2017-08-02 PROCEDURE — 73120 X-RAY EXAM OF HAND: CPT

## 2017-08-03 NOTE — PROGRESS NOTES
Radha,  Please call pt and make him aware the mass on his finger (4th digit, left hand) is not bone. Could be a cyst. If pts wants to I will order an ultrasound but I know he has transportation issues and may not be able to make a scheduled US appt. Let me know.  Thanks

## 2017-08-03 NOTE — TELEPHONE ENCOUNTER
Medication: Advair 250/50, dose: 1 inhalation, how often: twice daily, current number of medication days provided: 90, refill per application. Lot #: [id number], EXP.11/18. This medication was received and verified for the following 1. Correct Patient, 2. Correct Diagnosis, 3. Correct Drug, 4. Correct route, and no current allergy to medication. Medication: Ventolin HFA, dose: 2 puffs, how often: every 4 hours as needed, current number of medication days provided: 90, refill per application. Lot #: [id number], EXP.08/18. This medication was received and verified for the following 1. Correct Patient, 2. Correct Diagnosis, 3. Correct Drug, 4. Correct route, and no current allergy to medication. Please contact patient to come  their medications.      Antoni Dunn, MSN,RN,[facility]

## 2017-08-07 ENCOUNTER — TELEPHONE (OUTPATIENT)
Dept: FAMILY MEDICINE CLINIC | Age: 37
End: 2017-08-07

## 2017-08-07 NOTE — TELEPHONE ENCOUNTER
----- Message from Laura Lugo NP sent at 8/3/2017  7:14 AM EDT -----  Severiano Krill,  Please call pt and make him aware the mass on his finger (4th digit, left hand) is not bone. Could be a cyst. If pts wants to I will order an ultrasound but I know he has transportation issues and may not be able to make a scheduled US appt. Let me know.  Thanks

## 2017-08-29 ENCOUNTER — TELEPHONE (OUTPATIENT)
Dept: FAMILY MEDICINE CLINIC | Age: 37
End: 2017-08-29

## 2017-08-29 ENCOUNTER — DOCUMENTATION ONLY (OUTPATIENT)
Dept: FAMILY MEDICINE CLINIC | Age: 37
End: 2017-08-29

## 2017-08-31 ENCOUNTER — TELEPHONE (OUTPATIENT)
Dept: FAMILY MEDICINE CLINIC | Age: 37
End: 2017-08-31

## 2017-08-31 NOTE — TELEPHONE ENCOUNTER
Medication: Proventil HFA, dose: 2 puffs, how often: every 4 hours as needed for wheezing, current number of medication days provided: 90, refill per application. Lot [id number], EXP.05/19. This medication was received and verified for the following 1. Correct Patient, 2. Correct Diagnosis, 3. Correct Drug, 4. Correct route, and no current allergy to medication. Please contact patient to come  their medications.      Maria Canavan, MSN,RN,AGNP-C     [facility]

## 2017-10-05 ENCOUNTER — DOCUMENTATION ONLY (OUTPATIENT)
Dept: FAMILY MEDICINE CLINIC | Age: 37
End: 2017-10-05

## 2017-10-05 DIAGNOSIS — M79.642 PAIN OF LEFT HAND: Primary | ICD-10-CM

## 2017-10-05 DIAGNOSIS — R22.32 MASS OF FINGER OF LEFT HAND: ICD-10-CM

## 2017-10-05 NOTE — PROGRESS NOTES
Xray impression:  There is a 12 x 7 mm soft tissue mass adjacent to the distal aspect of the  proximal phalanx of the left fourth finger along its radial side. There is a  small associated linear 1.5 mm calcification. Bony structures themselves are  normal in appearance. No osseous abnormalities are seen. There is a soft tissue mass associated with the fourth digit as described above  of uncertain etiology. This is not associated with any osseous abnormalities. Ultrasonography may be useful in determining if this represents a cyst. MRI may  also have some diagnostic value. Notes Recorded by Renata Newby NP on 8/3/2017 at 7:14 AM  Knisey,  Please call pt and make him aware the mass on his finger (4th digit, left hand) is not bone. Could be a cyst. If pts wants to I will order an ultrasound but I know he has transportation issues and may not be able to make a scheduled US appt. Let me know. Thanks      Message  Received: Today       ZENA Correia NP                     The patient received his letter today about his finger. He wants to have the U/S as the area is growing in size. 1. Pain of left hand    - US EXT NONVAS LT LTD; Future    2.  Mass of finger of left hand    - US EXT NONVAS LT LTD; Future

## 2017-10-05 NOTE — PROGRESS NOTES
Called Pt to let him know that he is scheduled for US EXT NonVas LT LTD 10/9/17 @ 3 pm to be done at . Pt understand instructions given without any questions at this time.

## 2017-10-09 ENCOUNTER — HOSPITAL ENCOUNTER (OUTPATIENT)
Dept: ULTRASOUND IMAGING | Age: 37
Discharge: HOME | End: 2017-10-09
Attending: NURSE PRACTITIONER

## 2017-10-09 DIAGNOSIS — R22.32 MASS OF FINGER OF LEFT HAND: ICD-10-CM

## 2017-10-09 DIAGNOSIS — M79.642 PAIN OF LEFT HAND: ICD-10-CM

## 2017-10-09 PROCEDURE — 76882 US LMTD JT/FCL EVL NVASC XTR: CPT

## 2017-10-10 NOTE — PROGRESS NOTES
Arthur Adkins, please notify pt that he has a mass that is NOT a tumor. It is just extra tissue. No intervention at this time.  Thank you

## 2017-10-18 ENCOUNTER — TELEPHONE (OUTPATIENT)
Dept: FAMILY MEDICINE CLINIC | Age: 37
End: 2017-10-18

## 2017-10-19 NOTE — PROGRESS NOTES
Pt called to get US EXT NONVAS LT LTD results. Pt aware that he does not have a tumor just extra tissue and there is no intervention needed at this time. Pt says he want it removed because it's in his way when he is working and will try to make his own appointment to have it removed.

## 2017-10-19 NOTE — TELEPHONE ENCOUNTER
Medication: Lovaza 1gm, dose: 1 caps, how often: qd, current number of medication days provided: 90, refill per application. Lot #: [id number], EXP.10/18. This medication was received and verified for the following 1. Correct Patient, 2. Correct Diagnosis, 3. Correct Drug, 4. Correct route, and no current allergy to medication. Please contact patient to come  their medications.      Tomasa Kovacs, MSN,RN,[facility]

## 2017-12-12 ENCOUNTER — TELEPHONE (OUTPATIENT)
Dept: FAMILY MEDICINE CLINIC | Age: 37
End: 2017-12-12

## 2017-12-14 NOTE — TELEPHONE ENCOUNTER
Medication: Advair 250/50, dose: 1 inhalation, how often: twice daily, current number of medication days provided: 90, refill per application. Lot #: [id number], EXP.02/19. This medication was received and verified for the following 1. Correct Patient, 2. Correct Diagnosis, 3. Correct Drug, 4. Correct route, and no current allergy to medication. Please contact patient to come  their medications.      Anderson Bartlett, MSN,RN,[facility]

## 2018-02-27 ENCOUNTER — HOSPITAL ENCOUNTER (EMERGENCY)
Age: 38
Discharge: HOME | End: 2018-02-27
Attending: EMERGENCY MEDICINE
Payer: SELF-PAY

## 2018-02-27 VITALS
SYSTOLIC BLOOD PRESSURE: 160 MMHG | DIASTOLIC BLOOD PRESSURE: 97 MMHG | HEART RATE: 73 BPM | OXYGEN SATURATION: 97 % | TEMPERATURE: 98.3 F | RESPIRATION RATE: 17 BRPM

## 2018-02-27 DIAGNOSIS — K04.7 DENTAL ABSCESS: Primary | ICD-10-CM

## 2018-02-27 DIAGNOSIS — I10 ESSENTIAL HYPERTENSION: ICD-10-CM

## 2018-02-27 DIAGNOSIS — Z72.0 TOBACCO USE: ICD-10-CM

## 2018-02-27 DIAGNOSIS — K02.9 DENTAL CARIES: ICD-10-CM

## 2018-02-27 PROCEDURE — 99282 EMERGENCY DEPT VISIT SF MDM: CPT

## 2018-02-27 RX ORDER — HYDROCODONE BITARTRATE AND ACETAMINOPHEN 5; 325 MG/1; MG/1
TABLET ORAL
Qty: 12 TAB | Refills: 0 | Status: SHIPPED | OUTPATIENT
Start: 2018-02-27 | End: 2018-03-27 | Stop reason: ALTCHOICE

## 2018-02-27 RX ORDER — CLINDAMYCIN HYDROCHLORIDE 150 MG/1
300 CAPSULE ORAL 4 TIMES DAILY
Qty: 80 CAP | Refills: 0 | Status: SHIPPED | OUTPATIENT
Start: 2018-02-27 | End: 2018-03-09

## 2018-02-27 RX ORDER — PENICILLIN V POTASSIUM 500 MG/1
500 TABLET, FILM COATED ORAL 4 TIMES DAILY
Qty: 40 TAB | Refills: 0 | Status: SHIPPED | OUTPATIENT
Start: 2018-02-27 | End: 2018-03-09

## 2018-02-27 RX ORDER — IBUPROFEN 800 MG/1
800 TABLET ORAL EVERY 8 HOURS
Qty: 15 TAB | Refills: 0 | Status: SHIPPED | OUTPATIENT
Start: 2018-02-27 | End: 2018-03-04

## 2018-02-28 NOTE — ED PROVIDER NOTES
EMERGENCY DEPARTMENT HISTORY AND PHYSICAL EXAM    7:35 PM      Date: 2/27/2018  Patient Name: Topher Damico    History of Presenting Illness     Chief Complaint   Patient presents with    Dental Pain         History Provided By: Patient    Chief Complaint: Dental abscess   Duration:  Months  Timing:  Worsening  Location: Left upper  Modifying Factors: Pain exacerbates with chewing  Associated Symptoms: denies any other associated signs or symptoms      Additional History (Context):     Topher Damico is a 40 y.o. male with a pertinent history of dental caries, COPD, asthma who presents to the ED c/o worsening, left upper, dental abscess x \"months. \" Pain exacerbates with chewing. Admits to smoking tobacco and EtOH use. No other acute symptoms or complaints were noted. PCP: Wil Guajardo NP    Current Outpatient Prescriptions   Medication Sig Dispense Refill    penicillin v potassium (VEETID) 500 mg tablet Take 1 Tab by mouth four (4) times daily for 10 days. 40 Tab 0    clindamycin (CLEOCIN) 150 mg capsule Take 2 Caps by mouth four (4) times daily for 10 days. 80 Cap 0    ibuprofen (MOTRIN) 800 mg tablet Take 1 Tab by mouth every eight (8) hours for 5 days. 15 Tab 0    HYDROcodone-acetaminophen (NORCO) 5-325 mg per tablet Take 1-2 tablets PO every 4-6 hours as needed for pain control. If over the counter ibuprofen or acetaminophen was suggested, then only take the vicodin for pain not well controlled with the over the counter medication. 12 Tab 0    omega-3 acid ethyl esters (LOVAZA) 1 gram capsule Take 1 Cap by mouth daily (with breakfast). For triglycerides 90 Cap 3    albuterol (PROVENTIL HFA, VENTOLIN HFA, PROAIR HFA) 90 mcg/actuation inhaler Take 2 Puffs by inhalation every four (4) hours as needed for Wheezing or Shortness of Breath. 3 Inhaler 3    fluticasone-salmeterol (ADVAIR) 250-50 mcg/dose diskus inhaler Take 1 Puff by inhalation every twelve (12) hours.  3 Inhaler 3    Cholecalciferol, Vitamin D3, (VITAMIN D3) 1,000 unit cap Take  by mouth. Past History     Past Medical History:  Past Medical History:   Diagnosis Date    Asthma 1986    Chronic back pain     Chronic neck pain     Cluster headache     [facility]09[facility]2017    Mild Obstructive airway disease. See PFT results    Current smoker     Dental caries     Hypertriglyceridemia     Noncompliance with medication regimen 12/19/2016    Overuse of medication 12/19/2016    Scoliosis     Scoliosis of thoracic spine 12/19/2016    Shortness of breath 12/19/2016       Past Surgical History:  No past surgical history on file. Family History:  Family History   Problem Relation Age of Onset    Heart Attack Father     Hypertension Father     Migraines Father        Social History:  Social History   Substance Use Topics    Smoking status: Current Every Day Smoker     Packs/day: 0.80     Types: Cigarettes    Smokeless tobacco: Never Used    Alcohol use Yes       Allergies:  No Known Allergies      Review of Systems       Review of Systems   Constitutional: Negative for fever. HENT: Positive for dental problem. Skin: Negative for rash. All other systems reviewed and are negative. Physical Exam     Visit Vitals    BP (!) 160/97 (BP 1 Location: Left arm, BP Patient Position: At rest)    Pulse 73    Temp 98.3 °F (36.8 °C)    Resp 17    SpO2 97%         Physical Exam   Constitutional: Vital signs are normal. He appears well-developed and well-nourished. He is active. Non-toxic appearance. He does not appear ill. No distress. HENT:   Head: Normocephalic and atraumatic. Dental: multiple teeth absent. Fluctuance to gingiva above nearly eroded second premolar. No drooling or trismus. Neck: Normal range of motion. Neck supple. Carotid bruit is not present. No tracheal deviation present. No thyromegaly present.    Cardiovascular: Normal rate, regular rhythm and normal heart sounds. Exam reveals no gallop and no friction rub. No murmur heard. Pulmonary/Chest: Effort normal and breath sounds normal. No stridor. No respiratory distress. He has no wheezes. He has no rales. He exhibits no tenderness. Abdominal: Soft. He exhibits no distension and no mass. There is no tenderness. There is no rebound, no guarding and no CVA tenderness. Musculoskeletal: Normal range of motion. Neurological: He is alert. Skin: Skin is warm, dry and intact. He is not diaphoretic. No pallor. Psychiatric: He has a normal mood and affect. His speech is normal and behavior is normal. Judgment and thought content normal.   Nursing note and vitals reviewed. Diagnostic Study Results     Labs -  No results found for this or any previous visit (from the past 12 hour(s)). Radiologic Studies -   No orders to display         Medical Decision Making   I am the first provider for this patient. I reviewed the vital signs, available nursing notes, past medical history, past surgical history, family history and social history. Vital Signs-Reviewed the patient's vital signs. Pulse Oximetry Analysis -  97% on room air (Interpretation)    Records Reviewed: Nursing Notes (Time of Review: 7:35 PM)    ED Course: Progress Notes, Reevaluation, and Consults:    Provider Notes (Medical Decision Making):   Differential: abscess; gingivitis; caries    Treat abscess and refer to dental for f/up. Procedures:     Diagnosis     Clinical Impression:   1. Dental abscess    2. Dental caries    3. Tobacco use    4.  Essential hypertension        Disposition: Discharged     Follow-up Information     Follow up With Details Comments [address] Schedule an appointment as soon as possible for a visit in 1 day  [address] [facility] Schedule an appointment as soon as possible for a visit in 1 day  [address] [facility] [address]    [facility] EMERGENCY DEPT  If symptoms worsen return immediately [address] [address]  [phone]           Patient's Medications   Start Taking    CLINDAMYCIN (CLEOCIN) 150 MG CAPSULE    Take 2 Caps by mouth four (4) times daily for 10 days. HYDROCODONE-ACETAMINOPHEN (NORCO) 5-325 MG PER TABLET    Take 1-2 tablets PO every 4-6 hours as needed for pain control. If over the counter ibuprofen or acetaminophen was suggested, then only take the vicodin for pain not well controlled with the over the counter medication. IBUPROFEN (MOTRIN) 800 MG TABLET    Take 1 Tab by mouth every eight (8) hours for 5 days. PENICILLIN V POTASSIUM (VEETID) 500 MG TABLET    Take 1 Tab by mouth four (4) times daily for 10 days. Continue Taking    ALBUTEROL (PROVENTIL HFA, VENTOLIN HFA, PROAIR HFA) 90 MCG/ACTUATION INHALER    Take 2 Puffs by inhalation every four (4) hours as needed for Wheezing or Shortness of Breath. CHOLECALCIFEROL, VITAMIN D3, (VITAMIN D3) 1,000 UNIT CAP    Take  by mouth. FLUTICASONE-SALMETEROL (ADVAIR) 250-50 MCG/DOSE DISKUS INHALER    Take 1 Puff by inhalation every twelve (12) hours. OMEGA-3 ACID ETHYL ESTERS (LOVAZA) 1 GRAM CAPSULE    Take 1 Cap by mouth daily (with breakfast). For triglycerides   These Medications have changed    No medications on file   Stop Taking    No medications on file     _______________________________    Attestations:  Scribe Attestation     Jordy Corrales acting as a scribe for and in the presence of Charu Rosa PA-C     February 27, 2018 at 7:39 PM       Provider Attestation:      I personally performed the services described in the documentation, reviewed the documentation, as recorded by the scribe in my presence, and it accurately and completely records my words and actions.  February 27, 2018 at 7:39 PM - Charu Rosa PA-C    _______________________________

## 2018-02-28 NOTE — ED NOTES
Pt arrived to the ED with co dental abscess to left top tooth. Pt states he is having jaw pain and is unable to eat. Pt requesting an antibiotic.

## 2018-02-28 NOTE — DISCHARGE INSTRUCTIONS
High Blood Pressure: Care Instructions  Your Care Instructions    If your blood pressure is usually above 140/90, you have high blood pressure, or hypertension. That means the top number is 140 or higher or the bottom number is 90 or higher, or both. Despite what a lot of people think, high blood pressure usually doesn't cause headaches or make you feel dizzy or lightheaded. It usually has no symptoms. But it does increase your risk for heart attack, stroke, and kidney or eye damage. The higher your blood pressure, the more your risk increases. Your doctor will give you a goal for your blood pressure. Your goal will be based on your health and your age. An example of a goal is to keep your blood pressure below 140/90. Lifestyle changes, such as eating healthy and being active, are always important to help lower blood pressure. You might also take medicine to reach your blood pressure goal.  Follow-up care is a key part of your treatment and safety. Be sure to make and go to all appointments, and call your doctor if you are having problems. It's also a good idea to know your test results and keep a list of the medicines you take. How can you care for yourself at home? Medical treatment  · If you stop taking your medicine, your blood pressure will go back up. You may take one or more types of medicine to lower your blood pressure. Be safe with medicines. Take your medicine exactly as prescribed. Call your doctor if you think you are having a problem with your medicine. · Talk to your doctor before you start taking aspirin every day. Aspirin can help certain people lower their risk of a heart attack or stroke. But taking aspirin isn't right for everyone, because it can cause serious bleeding. · See your doctor regularly. You may need to see the doctor more often at first or until your blood pressure comes down.   · If you are taking blood pressure medicine, talk to your doctor before you take decongestants or anti-inflammatory medicine, such as ibuprofen. Some of these medicines can raise blood pressure. · Learn how to check your blood pressure at home. Lifestyle changes  · Stay at a healthy weight. This is especially important if you put on weight around the waist. Losing even 10 pounds can help you lower your blood pressure. · If your doctor recommends it, get more exercise. Walking is a good choice. Bit by bit, increase the amount you walk every day. Try for at least 30 minutes on most days of the week. You also may want to swim, bike, or do other activities. · Avoid or limit alcohol. Talk to your doctor about whether you can drink any alcohol. · Try to limit how much sodium you eat to less than 2,300 milligrams (mg) a day. Your doctor may ask you to try to eat less than 1,500 mg a day. · Eat plenty of fruits (such as bananas and oranges), vegetables, legumes, whole grains, and low-fat dairy products. · Lower the amount of saturated fat in your diet. Saturated fat is found in animal products such as milk, cheese, and meat. Limiting these foods may help you lose weight and also lower your risk for heart disease. · Do not smoke. Smoking increases your risk for heart attack and stroke. If you need help quitting, talk to your doctor about stop-smoking programs and medicines. These can increase your chances of quitting for good. When should you call for help? Call 911 anytime you think you may need emergency care. This may mean having symptoms that suggest that your blood pressure is causing a serious heart or blood vessel problem. Your blood pressure may be over 180/110. ? For example, call 911 if:  ? · You have symptoms of a heart attack. These may include:  ¨ Chest pain or pressure, or a strange feeling in the chest.  ¨ Sweating. ¨ Shortness of breath. ¨ Nausea or vomiting.   ¨ Pain, pressure, or a strange feeling in the back, neck, jaw, or upper belly or in one or both shoulders or arms.  ¨ Lightheadedness or sudden weakness. ¨ A fast or irregular heartbeat. ? · You have symptoms of a stroke. These may include:  ¨ Sudden numbness, tingling, weakness, or loss of movement in your face, arm, or leg, especially on only one side of your body. ¨ Sudden vision changes. ¨ Sudden trouble speaking. ¨ Sudden confusion or trouble understanding simple statements. ¨ Sudden problems with walking or balance. ¨ A sudden, severe headache that is different from past headaches. ? · You have severe back or belly pain. ?Do not wait until your blood pressure comes down on its own. Get help right away. ?Call your doctor now or seek immediate care if:  ? · Your blood pressure is much higher than normal (such as 180/110 or higher), but you don't have symptoms. ? · You think high blood pressure is causing symptoms, such as:  ¨ Severe headache. ¨ Blurry vision. ? Watch closely for changes in your health, and be sure to contact your doctor if:  ? · Your blood pressure measures 140/90 or higher at least 2 times. That means the top number is 140 or higher or the bottom number is 90 or higher, or both. ? · You think you may be having side effects from your blood pressure medicine. ? · Your blood pressure is usually normal, but it goes above normal at least 2 times. Where can you learn more? Go to http://neptali-abhinav.info/. Enter M314 in the search box to learn more about \"High Blood Pressure: Care Instructions. \"  Current as of: September 21, 2016  Content Version: 11.4  © 2474-3419 [email or web address]. Care instructions adapted under license by [employer] (which disclaims liability or warranty for this information). If you have questions about a medical condition or this instruction, always ask your healthcare professional. [place] [address] any warranty or liability for your use of this information.          DASH Diet: Care Instructions  Your Care Instructions    The DASH diet is an eating plan that can help lower your blood pressure. DASH stands for Dietary Approaches to Stop Hypertension. Hypertension is high blood pressure. The DASH diet focuses on eating foods that are high in calcium, potassium, and magnesium. These nutrients can lower blood pressure. The foods that are highest in these nutrients are fruits, vegetables, low-fat dairy products, nuts, seeds, and legumes. But taking calcium, potassium, and magnesium supplements instead of eating foods that are high in those nutrients does not have the same effect. The DASH diet also includes whole grains, fish, and poultry. The DASH diet is one of several lifestyle changes your doctor may recommend to lower your high blood pressure. Your doctor may also want you to decrease the amount of sodium in your diet. Lowering sodium while following the DASH diet can lower blood pressure even further than just the DASH diet alone. Follow-up care is a key part of your treatment and safety. Be sure to make and go to all appointments, and call your doctor if you are having problems. It's also a good idea to know your test results and keep a list of the medicines you take. How can you care for yourself at home? Following the DASH diet  · Eat 4 to 5 servings of fruit each day. A serving is 1 medium-sized piece of fruit, ½ cup chopped or canned fruit, 1/4 cup dried fruit, or 4 ounces (½ cup) of fruit juice. Choose fruit more often than fruit juice. · Eat 4 to 5 servings of vegetables each day. A serving is 1 cup of lettuce or raw leafy vegetables, ½ cup of chopped or cooked vegetables, or 4 ounces (½ cup) of vegetable juice. Choose vegetables more often than vegetable juice. · Get 2 to 3 servings of low-fat and fat-free dairy each day. A serving is 8 ounces of milk, 1 cup of yogurt, or 1 ½ ounces of cheese. · Eat 6 to 8 servings of grains each day.  A serving is 1 slice of bread, 1 ounce of dry cereal, or ½ cup of cooked rice, pasta, or cooked cereal. Try to choose whole-grain products as much as possible. · Limit lean meat, poultry, and fish to 2 servings each day. A serving is 3 ounces, about the size of a deck of cards. · Eat 4 to 5 servings of nuts, seeds, and legumes (cooked dried beans, lentils, and split peas) each week. A serving is 1/3 cup of nuts, 2 tablespoons of seeds, or ½ cup of cooked beans or peas. · Limit fats and oils to 2 to 3 servings each day. A serving is 1 teaspoon of vegetable oil or 2 tablespoons of salad dressing. · Limit sweets and added sugars to 5 servings or less a week. A serving is 1 tablespoon jelly or jam, ½ cup sorbet, or 1 cup of lemonade. · Eat less than 2,300 milligrams (mg) of sodium a day. If you limit your sodium to 1,500 mg a day, you can lower your blood pressure even more. Tips for success  · Start small. Do not try to make dramatic changes to your diet all at once. You might feel that you are missing out on your favorite foods and then be more likely to not follow the plan. Make small changes, and stick with them. Once those changes become habit, add a few more changes. · Try some of the following:  ¨ Make it a goal to eat a fruit or vegetable at every meal and at snacks. This will make it easy to get the recommended amount of fruits and vegetables each day. ¨ Try yogurt topped with fruit and nuts for a snack or healthy dessert. ¨ Add lettuce, tomato, cucumber, and onion to sandwiches. ¨ Combine a ready-made pizza crust with low-fat mozzarella cheese and lots of vegetable toppings. Try using tomatoes, squash, spinach, broccoli, carrots, cauliflower, and onions. ¨ Have a variety of cut-up vegetables with a low-fat dip as an appetizer instead of chips and dip. ¨ Sprinkle sunflower seeds or chopped almonds over salads. Or try adding chopped walnuts or almonds to cooked vegetables. ¨ Try some vegetarian meals using beans and peas. Add garbanzo or kidney beans to salads. Make burritos and tacos with mashed zarco beans or black beans. Where can you learn more? Go to http://neptali-abhinav.info/. Enter U698 in the search box to learn more about \"DASH Diet: Care Instructions. \"  Current as of: September 21, 2016  Content Version: 11.4  © 3074-8735 [employer]. Care instructions adapted under license by [employer] (which disclaims liability or warranty for this information). If you have questions about a medical condition or this instruction, always ask your healthcare professional. [place] [address] any warranty or liability for your use of this information. Stopping Smoking: Care Instructions  Your Care Instructions    Cigarette smokers crave the nicotine in cigarettes. Giving it up is much harder than simply changing a habit. Your body has to stop craving the nicotine. It is hard to quit, but you can do it. There are many tools that people use to quit smoking. You may find that combining tools works best for you. There are several steps to quitting. First you get ready to quit. Then you get support to help you. After that, you learn new skills and behaviors to become a nonsmoker. For many people, a necessary step is getting and using medicine. Your doctor will help you set up the plan that best meets your needs. You may want to attend a smoking cessation program to help you quit smoking. When you choose a program, look for one that has proven success. Ask your doctor for ideas. You will greatly increase your chances of success if you take medicine as well as get counseling or join a cessation program.  Some of the changes you feel when you first quit tobacco are uncomfortable. Your body will miss the nicotine at first, and you may feel short-tempered and grumpy. You may have trouble sleeping or concentrating. Medicine can help you deal with these symptoms.  You may struggle with changing your smoking habits and rituals. The last step is the tricky one: Be prepared for the smoking urge to continue for a time. This is a lot to deal with, but keep at it. You will feel better. Follow-up care is a key part of your treatment and safety. Be sure to make and go to all appointments, and call your doctor if you are having problems. It's also a good idea to know your test results and keep a list of the medicines you take. How can you care for yourself at home? · Ask your family, friends, and coworkers for support. You have a better chance of quitting if you have help and support. · Join a support group, such as Nicotine Anonymous, for people who are trying to quit smoking. · Consider signing up for a smoking cessation program, such as the American Lung Association's Freedom from Smoking program.  · Set a quit date. Pick your date carefully so that it is not right in the middle of a big deadline or stressful time. Once you quit, do not even take a puff. Get rid of all ashtrays and lighters after your last cigarette. Clean your house and your clothes so that they do not smell of smoke. · Learn how to be a nonsmoker. Think about ways you can avoid those things that make you reach for a cigarette. ¨ Avoid situations that put you at greatest risk for smoking. For some people, it is hard to have a drink with friends without smoking. For others, they might skip a coffee break with coworkers who smoke. ¨ Change your daily routine. Take a different route to work or eat a meal in a different place. · Cut down on stress. Calm yourself or release tension by doing an activity you enjoy, such as reading a book, taking a hot bath, or gardening. · Talk to your doctor or pharmacist about nicotine replacement therapy, which replaces the nicotine in your body. You still get nicotine but you do not use tobacco. Nicotine replacement products help you slowly reduce the amount of nicotine you need.  These products come in several forms, many of them available over-the-counter:  ¨ Nicotine patches  ¨ Nicotine gum and lozenges  ¨ Nicotine inhaler  · Ask your doctor about bupropion (Wellbutrin) or varenicline (Chantix), which are prescription medicines. They do not contain nicotine. They help you by reducing withdrawal symptoms, such as stress and anxiety. · Some people find hypnosis, acupuncture, and massage helpful for ending the smoking habit. · Eat a healthy diet and get regular exercise. Having healthy habits will help your body move past its craving for nicotine. · Be prepared to keep trying. Most people are not successful the first few times they try to quit. Do not get mad at yourself if you smoke again. Make a list of things you learned and think about when you want to try again, such as next week, next month, or next year. Where can you learn more? Go to http://neptaliSuperSecretabhinav.info/. Enter X890 in the search box to learn more about \"Stopping Smoking: Care Instructions. \"  Current as of: March 20, 2017  Content Version: 11.4  © 7364-9967 [employer]. Care instructions adapted under license by [employer] (which disclaims liability or warranty for this information). If you have questions about a medical condition or this instruction, always ask your healthcare professional. [address] any warranty or liability for your use of this information. Tooth Decay: Care Instructions  Your Care Instructions    Tooth decay is damage to a tooth caused by plaque. Plaque is a thin film of bacteria that sticks to the teeth above and below the gum line. If plaque isn't removed from the teeth, it can build up and harden into tartar. The bacteria in plaque and tartar use sugars in food to make acids. These acids can cause tooth decay and gum disease. Any part of your tooth can decay, from the roots below the gum line to the chewing surface.  Decay can affect the outer layer (enamel) or inner layer (dentin) of your teeth. The deeper the decay, the worse the damage. Untreated tooth decay will get worse and may lead to tooth loss. If you have a small hole (cavity) in your tooth, your dentist can repair it by removing the decay and filling the hole. If you have deeper decay, you may need more treatment. A very badly damaged tooth may have to be removed. Follow-up care is a key part of your treatment and safety. Be sure to make and go to all appointments, and call your dentist if you are having problems. It's also a good idea to know your test results and keep a list of the medicines you take. How can you care for yourself at home? If you have pain:  · Take an over-the-counter pain medicine, such as acetaminophen (Tylenol), ibuprofen (Advil, Motrin), or naproxen (Aleve). Be safe with medicines. Read and follow all instructions on the label. ¨ Do not take two or more pain medicines at the same time unless the doctor told you to. Many pain medicines have acetaminophen, which is Tylenol. Too much acetaminophen (Tylenol) can be harmful. · Put ice or a cold pack on your cheek over the tooth for 10 to 15 minutes at a time. Put a thin cloth between the ice and your skin. To prevent tooth decay  · Brush teeth twice a day, and floss once a day. Brushing with fluoride toothpaste and flossing may be enough to reverse early decay. · Use a toothbrush with soft, rounded-end bristles and a head that is small enough to reach all parts of your teeth and mouth. Replace your toothbrush every 3 or 4 months. You may also use an electric toothbrush that has rotating and oscillating (back-and-forth) action. · Ask your dentist about having fluoride treatments at the dental office. · Brush your tongue to help get rid of bacteria. · Eat healthy foods that include whole grains, vegetables, and fruits. · Have your teeth cleaned by a professional at least two times a year.   · Do not smoke or use smokeless tobacco. Tobacco can make tooth decay worse. When should you call for help? Call 911 anytime you think you may need emergency care. For example, call if:  ? · You have trouble breathing. ?Call your dentist now or seek immediate medical care if:  ? · You have new or worse symptoms of infection, such as:  ¨ Increased pain, swelling, warmth, or redness. ¨ Red streaks leading from the area. ¨ Pus draining from the area. ¨ A fever. ? Watch closely for changes in your health, and be sure to contact your doctor if:  ? · You do not get better as expected. Where can you learn more? Go to http://neptaliOoolalaabhinav.info/. Enter Y181 in the search box to learn more about \"Tooth Decay: Care Instructions. \"  Current as of: May 12, 2017  Content Version: 11.4  © 3619-4989 [employer]. Care instructions adapted under license by [employer] (which disclaims liability or warranty for this information). If you have questions about a medical condition or this instruction, always ask your healthcare professional. [place] [address] any warranty or liability for your use of this information. Learning About Benefits From Quitting Smoking  How does quitting smoking make you healthier? If you're thinking about quitting smoking, you may have a few reasons to be smoke-free. Your health may be one of them. · When you quit smoking, you lower your risks for cancer, lung disease, heart attack, stroke, blood vessel disease, and blindness from macular degeneration. · When you're smoke-free, you get sick less often, and you heal faster. You are less likely to get colds, flu, bronchitis, and pneumonia. · As a nonsmoker, you may find that your mood is better and you are less stressed. When and how will you feel healthier? Quitting has real health benefits that start from day 1 of being smoke-free. And the longer you stay smoke-free, the healthier you get and the better you feel.   The first hours  · After just 20 minutes, your blood pressure and heart rate go down. That means there's less stress on your heart and blood vessels. · Within 12 hours, the level of carbon monoxide in your blood drops back to normal. That makes room for more oxygen. With more oxygen in your body, you may notice that you have more energy than when you smoked. After 2 weeks  · Your lungs start to work better. · Your risk of heart attack starts to drop. After 1 month  · When your lungs are clear, you cough less and breathe deeper, so it's easier to be active. · Your sense of taste and smell return. That means you can enjoy food more than you have since you started smoking. Over the years  · After 1 year, your risk of heart disease is half what it would be if you kept smoking. · After 5 years, your risk of stroke starts to shrink. Within a few years after that, it's about the same as if you'd never smoked. · After 10 years, your risk of dying from lung cancer is cut by about half. And your risk for many other types of cancer is lower too. How would quitting help others in your life? When you quit smoking, you improve the health of everyone who now breathes in your smoke. · Their heart, lung, and cancer risks drop, much like yours. · They are sick less. For babies and small children, living smoke-free means they're less likely to have ear infections, pneumonia, and bronchitis. · If you're a woman who is or will be pregnant someday, quitting smoking means a healthier . · Children who are close to you are less likely to become adult smokers. Where can you learn more? Go to http://neptali-abhinav.info/. Enter [id number] in the search box to learn more about \"Learning About Benefits From Quitting Smoking. \"  Current as of: 2017  Content Version: 11.4  © 5842-3410 [employer].  Care instructions adapted under license by [employer] (which disclaims liability or warranty for this information). If you have questions about a medical condition or this instruction, always ask your healthcare professional. [place] [address] any warranty or liability for your use of this information.

## 2018-03-01 ENCOUNTER — HOSPITAL ENCOUNTER (OUTPATIENT)
Dept: LAB | Age: 38
Discharge: HOME | End: 2018-03-01

## 2018-03-01 ENCOUNTER — LAB ONLY (OUTPATIENT)
Dept: FAMILY MEDICINE CLINIC | Age: 38
End: 2018-03-01

## 2018-03-01 DIAGNOSIS — Z13.9 SCREENING PROCEDURE: ICD-10-CM

## 2018-03-01 DIAGNOSIS — Z13.9 SCREENING PROCEDURE: Primary | ICD-10-CM

## 2018-03-01 LAB
ALBUMIN SERPL-MCNC: 4.4 G/DL (ref 3.4–5)
ALBUMIN/GLOB SERPL: 1.5 {RATIO} (ref 0.8–1.7)
ALP SERPL-CCNC: 55 U/L (ref 45–117)
ALT SERPL-CCNC: 13 U/L (ref 16–61)
ANION GAP SERPL CALC-SCNC: 5 MMOL/L (ref 3–18)
AST SERPL-CCNC: 16 U/L (ref 15–37)
BILIRUB SERPL-MCNC: 0.7 MG/DL (ref 0.2–1)
BUN SERPL-MCNC: 14 MG/DL (ref 7–18)
BUN/CREAT SERPL: 13 (ref 12–20)
CALCIUM SERPL-MCNC: 9.1 MG/DL (ref 8.5–10.1)
CHLORIDE SERPL-SCNC: 106 MMOL/L (ref 100–108)
CHOLEST SERPL-MCNC: 164 MG/DL
CO2 SERPL-SCNC: 27 MMOL/L (ref 21–32)
CREAT SERPL-MCNC: 1.08 MG/DL (ref 0.6–1.3)
EST. AVERAGE GLUCOSE BLD GHB EST-MCNC: 103 MG/DL
GLOBULIN SER CALC-MCNC: 3 G/DL (ref 2–4)
GLUCOSE SERPL-MCNC: 95 MG/DL (ref 74–99)
HBA1C MFR BLD: 5.2 % (ref 4.2–5.6)
HDLC SERPL-MCNC: 57 MG/DL (ref 40–60)
HDLC SERPL: 2.9 {RATIO} (ref 0–5)
LDLC SERPL CALC-MCNC: 77.4 MG/DL (ref 0–100)
LIPID PROFILE,FLP: NORMAL
POTASSIUM SERPL-SCNC: 4 MMOL/L (ref 3.5–5.5)
PROT SERPL-MCNC: 7.4 G/DL (ref 6.4–8.2)
SODIUM SERPL-SCNC: 138 MMOL/L (ref 136–145)
TRIGL SERPL-MCNC: 148 MG/DL (ref ?–150)
VLDLC SERPL CALC-MCNC: 29.6 MG/DL

## 2018-03-01 PROCEDURE — 83036 HEMOGLOBIN GLYCOSYLATED A1C: CPT | Performed by: NURSE PRACTITIONER

## 2018-03-01 PROCEDURE — 80053 COMPREHEN METABOLIC PANEL: CPT | Performed by: NURSE PRACTITIONER

## 2018-03-01 PROCEDURE — 80061 LIPID PANEL: CPT | Performed by: NURSE PRACTITIONER

## 2018-03-27 ENCOUNTER — OFFICE VISIT (OUTPATIENT)
Dept: FAMILY MEDICINE CLINIC | Age: 38
End: 2018-03-27

## 2018-03-27 VITALS
OXYGEN SATURATION: 98 % | SYSTOLIC BLOOD PRESSURE: 131 MMHG | HEART RATE: 68 BPM | DIASTOLIC BLOOD PRESSURE: 63 MMHG | BODY MASS INDEX: 20.47 KG/M2 | TEMPERATURE: 97.7 F | RESPIRATION RATE: 16 BRPM | HEIGHT: 71 IN | WEIGHT: 146.2 LBS

## 2018-03-27 DIAGNOSIS — Z91.09 ENVIRONMENTAL ALLERGIES: ICD-10-CM

## 2018-03-27 DIAGNOSIS — M79.642 PAIN OF LEFT HAND: ICD-10-CM

## 2018-03-27 DIAGNOSIS — R22.32 NODULE OF FINGER OF LEFT HAND: ICD-10-CM

## 2018-03-27 DIAGNOSIS — F17.200 CURRENT SMOKER: ICD-10-CM

## 2018-03-27 DIAGNOSIS — B07.9 VIRAL WARTS, UNSPECIFIED TYPE: Primary | ICD-10-CM

## 2018-03-27 RX ORDER — CETIRIZINE HCL 10 MG
10 TABLET ORAL
Qty: 30 TAB | Refills: 11
Start: 2018-03-27 | End: 2018-04-26

## 2018-03-27 NOTE — MR AVS SNAPSHOT
[address] [facility] [id number] [phone] Patient: Jovanni Hyatt MRN: [id number] SBN:[id number] Visit Information Date & Time Provider Department Dept. Phone Encounter #  
 3/27/2018  3:30 PM Jay Herrera NP [address] [facility] [id number] Your Appointments 6/26/2018  1:30 PM  
Follow Up with Jay Herrera NP [address] [facility] ([facility]) Appt Note: 3 mth follow up  
 [address] [facility] [id number]  
[address] [facility] [address] Upcoming Health Maintenance Date Due Influenza Age 5 to Adult 8/1/2017 DTaP/Tdap/Td series (2 - Td) 8/2/2027 Allergies as of 3/27/2018  Review Complete On: 3/27/2018 By: Smiley Cueto No Known Allergies Current Immunizations  Never Reviewed No immunizations on file. Not reviewed this visit You Were Diagnosed With   
  
 Codes Comments Viral warts, unspecified type    -  Primary ICD-10-CM: B07.9 ICD-9-CM: 078.10 Environmental allergies     ICD-10-CM: Z91.09 
ICD-9-CM: V15.09 Pain of left hand     ICD-10-CM: E97.144 ICD-9-CM: 729.5 Current smoker     ICD-10-CM: F17.200 ICD-9-CM: 305.1 Vitals BP Pulse Temp Resp Height(growth percentile) Weight(growth percentile) 131/63 68 97.7 °F (36.5 °C) 16 5' 11\" (1.803 m) 146 lb 3.2 oz (66.3 kg) SpO2 BMI Smoking Status 98% 20.39 kg/m2 Current Every Day Smoker BMI and BSA Data Body Mass Index Body Surface Area  
 20.39 kg/m 2 1.82 m 2 Preferred Pharmacy Pharmacy Name Phone Jeremy Mora [address] [facility]. [phone] Your Updated Medication List  
  
   
This list is accurate as of 3/27/18  3:37 PM.  Always use your most recent med list.  
  
  
  
  
 albuterol 90 mcg/actuation inhaler Commonly known as:  PROVENTIL HFA, VENTOLIN HFA, PROAIR HFA Take 2 Puffs by inhalation every four (4) hours as needed for Wheezing or Shortness of Breath. cetirizine 10 mg tablet Commonly known as:  ZyrTEC Take 1 Tab by mouth daily as needed for up to 30 days. For allergies  
  
 fluticasone-salmeterol 250-50 mcg/dose diskus inhaler Commonly known as:  ADVAIR Take 1 Puff by inhalation every twelve (12) hours. omega-3 acid ethyl esters 1 gram capsule Commonly known as:  Rosalita [place] Take 1 Cap by mouth daily (with breakfast). For triglycerides VITAMIN D3 1,000 unit Cap Generic drug:  cholecalciferol Take  by mouth. We Performed the Following REFERRAL TO PODIATRY [REF90 Custom] Comments:  
 Please evaluate patient for plantar warts. Referral Information Referral ID Referred By Referred To  
  
 [id number] Eveline Mejia Not Available Visits Status Start Date End Date 1 New Request 3/27/18 3/27/19 If your referral has a status of pending review or denied, additional information will be sent to support the outcome of this decision. Please provide this summary of care documentation to your next provider. Your primary care clinician is listed as Sheldon Vazquez. If you have any questions after today's visit, please call [phone].

## 2018-03-27 NOTE — PROGRESS NOTES
[facility]  [address] [address] [facility] [address] [facility] [address], [address]  [phone] office/[phone] fax      3/27/2018    Reason for visit:   Chief Complaint   Patient presents with    Follow Up Chronic Condition       Patient: Eddie Davis, 1980, [id number]       Primary MD: Alexis Granda NP    Subjective:   Eddie Davis, a 40 y.o. male, who presents for Follow Up Chronic Condition      Past Medical History:   Diagnosis Date    Asthma 1986    Chronic back pain     Chronic neck pain     Cluster headache     [facility]2017    Mild Obstructive airway disease. See PFT results    Current smoker     Dental caries     Hypertriglyceridemia     Noncompliance with medication regimen 12/19/2016    Overuse of medication 12/19/2016    Scoliosis     Scoliosis of thoracic spine 12/19/2016    Shortness of breath 12/19/2016       No past surgical history on file. Social History     Social History    Marital status: SINGLE     Spouse name: N/A    Number of children: 0    Years of education: N/A     Occupational History    Not on file. Social History Main Topics    Smoking status: Current Every Day Smoker     Packs/day: 0.80     Types: Cigarettes    Smokeless tobacco: Never Used    Alcohol use Yes    Drug use: No    Sexual activity: Not on file     Other Topics Concern     Service No    Blood Transfusions No    Caffeine Concern No    Occupational Exposure No    Hobby Hazards No    Sleep Concern Yes    Stress Concern No    Weight Concern No    Special Diet No    Back Care Yes     scoliosis    Exercise Yes    Bike Helmet No    Seat Belt Yes    Self-Exams No     Social History Narrative       No Known Allergies    Current Outpatient Prescriptions on File Prior to Visit   Medication Sig Dispense Refill    omega-3 acid ethyl esters (LOVAZA) 1 gram capsule Take 1 Cap by mouth daily (with breakfast).  For triglycerides 90 Cap 3    albuterol (PROVENTIL HFA, VENTOLIN HFA, PROAIR HFA) 90 mcg/actuation inhaler Take 2 Puffs by inhalation every four (4) hours as needed for Wheezing or Shortness of Breath. 3 Inhaler 3    fluticasone-salmeterol (ADVAIR) 250-50 mcg/dose diskus inhaler Take 1 Puff by inhalation every twelve (12) hours. 3 Inhaler 3    Cholecalciferol, Vitamin D3, (VITAMIN D3) 1,000 unit cap Take  by mouth. No current facility-administered medications on file prior to visit. Review of Systems   Respiratory:        I smoke 1 pack of cigs per week. 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 I dont like nasal sprays. I will get zyrtec). Psychiatric/Behavioral:        I drink alcohol almost  Everyday. Rt foot near great toe and 2nd dig      Objective:   Visit Vitals    /63    Pulse 68    Temp 97.7 °F (36.5 °C)    Resp 16    Ht 5' 11\" (1.803 m)    Wt 146 lb 3.2 oz (66.3 kg)    SpO2 98%    BMI 20.39 kg/m2      Wt Readings from Last 3 Encounters:   03/27/18 146 lb 3.2 oz (66.3 kg)   08/02/17 143 lb 3.2 oz (65 kg)   04/12/17 145 lb 9.6 oz (66 kg)     Lab Results   Component Value Date/Time    Glucose 95 03/01/2018 09:19 AM         Physical Exam   Skin:        Left hand mass 2cmx1.5cm 4th digit       Assessment:    Denaejalil Hammond who has risk factors including (see above previous medical hx) and:     1. Viral warts, unspecified type    - REFERRAL TO PODIATRY    2. Nodule of finger of left hand  Due to pt lacking medical insurance, nodule is not life threatening, and is not affecting his daily living, pt is unable to have surgery at this time.     10/9/17 US FINDINGS Left hand 4th digit:  At the proximal fourth digit, there is a superficial hyperechoic nodule  measuring 1.3 x 0.9 x 0.8 cm, without internal vascularity. 3. Environmental allergies  Sinus action plan! Avoid the allergen if you know what it is (strong smells, animals, cigarettes or carpet areas)    Start your medications as soon as the first sneeze, runny nose, watery eye or tickle in your throat appears and plan to continue it EVERY DAY for the next 2-4 weeks. Antihistamine:   Zyrtec (generic) over the counter, 10 mg once a day in the morning, will not make you sleepy - to help turn off the allergic reaction in your sinus     If you are a smoker. ... ! Stop ! It continues to irritate the sinus and keeps you sicker longer. A typical allergic sinus issue, AKA cold, can last for 2 weeks, if you are a smoker it can last up to 6 weeks. Instructed pt to sit up slowly when getting up from a lying position and then wait a moment before standing. Once standing, pt needs to wait a moment before attempting to walk. This will help prevent the loss of equilibrium when their allergies are active. - cetirizine (ZYRTEC) 10 mg tablet; Take 1 Tab by mouth daily as needed for up to 30 days. For allergies  Dispense: 30 Tab; Refill: 11    4. Current smoker  Counseled patient on the dangers of tobacco use, and was advised to quit. Reviewed strategies to maximize success, including the use of Chantix. Discussed the risks of continued tobacco use such as elevated blood pressure, vascular irritation with increased incidence of CVD with stroke or MI and PVD causing claudication, lung damage that could lead to COPD, cancer and death. Encouraged an approach to find a few healthy habits, write them down then plan to decrease their cigarette use by one each week till they are gone all together and to plan for stress that may cause them to want to restart and how to prevent it by having new coping mechanisms in place.     1-800-QUIT-NOW provided for counseling         Written instructions followed our verbal discussion of all information discussed above, pending tests ordered and future goals/plans. Patient expressed understanding of current diagnosis, planned testing, follow up and if needed to contact the office for any questions or concerns prior to the next visit. Plan:   Med reconciliation completed with patient. Reviewed side effects of medications with the patient. Questions were answered and patient verb understanding. Discussed lab results with patient  a1c 5.2   to 72      Pt had to leave his appt abruptly because his mother had to leave to go help someone else in stress. Orders Placed This Encounter    REFERRAL TO PODIATRY     Referral Priority:   Routine     Referral Type:   Consultation     Referral Reason:   Specialty Services Required    cetirizine (ZYRTEC) 10 mg tablet     Sig: Take 1 Tab by mouth daily as needed for up to 30 days. For allergies     Dispense:  30 Tab     Refill:  11     Current Outpatient Prescriptions   Medication Sig Dispense Refill    cetirizine (ZYRTEC) 10 mg tablet Take 1 Tab by mouth daily as needed for up to 30 days. For allergies 30 Tab 11    omega-3 acid ethyl esters (LOVAZA) 1 gram capsule Take 1 Cap by mouth daily (with breakfast). For triglycerides 90 Cap 3    albuterol (PROVENTIL HFA, VENTOLIN HFA, PROAIR HFA) 90 mcg/actuation inhaler Take 2 Puffs by inhalation every four (4) hours as needed for Wheezing or Shortness of Breath. 3 Inhaler 3    fluticasone-salmeterol (ADVAIR) 250-50 mcg/dose diskus inhaler Take 1 Puff by inhalation every twelve (12) hours. 3 Inhaler 3    Cholecalciferol, Vitamin D3, (VITAMIN D3) 1,000 unit cap Take  by mouth. Follow-up Disposition:  Return in about 3 months (around 6/27/2018). No labs needed for month follow-up appt      Marizol Reeves, RN, MSN, [employer]   [address] [place] [address]      I spent 25 minutes with the patient in face-to-face consultation, of which greater than 50% was spent in counseling and coordination of care as described above.

## 2018-03-27 NOTE — LETTER
3/27/2018 [address] [facility] [address] [facility], 1980, is picking up the following medications ordered from the [facility]. ADVAIR DISKUS 250/50 MCG QUANTITY: 3 & LOVAZA 1 GRAM CAPSULE: 90 Argelia Sims Patient's Signature:_________________________________________________ Today's Date: 3/27/2018

## 2018-04-05 ENCOUNTER — TELEPHONE (OUTPATIENT)
Dept: FAMILY MEDICINE CLINIC | Age: 38
End: 2018-04-05

## 2018-04-09 NOTE — TELEPHONE ENCOUNTER
Medication: Proventil HFA, dose: 2 puffs, how often: every 4 hours as needed for wheezing, current number of medication days provided: 90, refill per application. Lot# [id number], EXP.09/19 . This medication was received and verified for the following 1. Correct Patient, 2. Correct Diagnosis, 3. Correct Drug, 4. Correct route, and no current allergy to medication. Please contact patient to come  their medications.      Lorin Arzate, ALFREDO,RN,[facility]

## 2018-06-26 ENCOUNTER — OFFICE VISIT (OUTPATIENT)
Dept: FAMILY MEDICINE CLINIC | Age: 38
End: 2018-06-26

## 2018-06-26 VITALS
DIASTOLIC BLOOD PRESSURE: 82 MMHG | BODY MASS INDEX: 20.02 KG/M2 | SYSTOLIC BLOOD PRESSURE: 119 MMHG | WEIGHT: 143 LBS | OXYGEN SATURATION: 98 % | TEMPERATURE: 97.8 F | HEIGHT: 71 IN | HEART RATE: 80 BPM | RESPIRATION RATE: 16 BRPM

## 2018-06-26 DIAGNOSIS — Z91.09 ENVIRONMENTAL ALLERGIES: Chronic | ICD-10-CM

## 2018-06-26 DIAGNOSIS — F17.200 CURRENT SMOKER: Chronic | ICD-10-CM

## 2018-06-26 DIAGNOSIS — S99.921A SOFT TISSUE INJURY OF RIGHT FOOT, INITIAL ENCOUNTER: ICD-10-CM

## 2018-06-26 DIAGNOSIS — F90.9 HYPERACTIVE: ICD-10-CM

## 2018-06-26 DIAGNOSIS — Z91.89 COMPLIANCE WITH MEDICATION REGIMEN: ICD-10-CM

## 2018-06-26 DIAGNOSIS — J44.9 CHRONIC OBSTRUCTIVE PULMONARY DISEASE, UNSPECIFIED COPD TYPE (HCC): Primary | ICD-10-CM

## 2018-06-26 RX ORDER — VARENICLINE TARTRATE 1 MG/1
TABLET, FILM COATED ORAL
Qty: 112 TAB | Refills: 3 | Status: SHIPPED | COMMUNITY
Start: 2018-06-26 | End: 2018-09-24

## 2018-06-26 RX ORDER — IBUPROFEN 800 MG/1
800 TABLET ORAL
Qty: 15 TAB | Refills: 0 | Status: SHIPPED | OUTPATIENT
Start: 2018-06-26 | End: 2018-09-26

## 2018-06-26 RX ORDER — IPRATROPIUM BROMIDE AND ALBUTEROL SULFATE 2.5; .5 MG/3ML; MG/3ML
3 SOLUTION RESPIRATORY (INHALATION)
Qty: 30 NEBULE | Refills: 1 | Status: SHIPPED | OUTPATIENT
Start: 2018-06-26 | End: 2018-10-02 | Stop reason: SDUPTHER

## 2018-06-26 RX ORDER — VARENICLINE TARTRATE 25 MG
KIT ORAL
Qty: 1 DOSE PACK | Refills: 0 | Status: SHIPPED | COMMUNITY
Start: 2018-06-26 | End: 2018-08-30 | Stop reason: ALTCHOICE

## 2018-06-26 NOTE — PATIENT INSTRUCTIONS
[facility] reminders! Foundation Operating Hours: These may change without notice. Mon- Wed 7am to 5pm. Closed for lunch 12-1pm  Thurs 7am to 12pm  Fridays closed     Office number:     **In case of inclement weather (snow and/or ice) please do not try to come into the office for your appointment. Please call in and you will not be counted as a \"No Show. \" SAFETY FIRST!!**    NO SHOW POLICY ~ If a patient has 3 no shows for an appointment with the Provider, Mental Health Provider, or the Nurse Navigator, they will be discharged from the practice for 6 months. Medication ordering will also be suspended. If the patient is discharged from the [facility], they can go to the [place] [address] or [address] [place] [address] where they can be seen for their primary care needs plus obtain the same type of medications as they received at the [facility]. To avoid being discharged the patient must call the office at [phone] 24 hours prior to their appointment if they need to cancel or reschedule, arrive to their appointments on time (preferrably 15 minutes early) (If you are laste you will not be seen) and come to all scheduled appointments with the provider, mental health, and/or nurse navigator. If the patient is discharged from the practice, they can apply to be re-established after 6 months. Lab work:    Unless you are instructed differently, please return to the office between the hours of 7 am and 10:30 am Monday through Thursday to have your labs drawn one to two weeks before your next scheduled PROVIDER appointment. If you do not have an appointment to follow up on these results, please make one or plan to call the office if you do not hear from us to get the results. No news does not mean good news. Medications:   Medication  times are firm:  Mondays and Tuesdays from 1pm-5pm  Wednesdays and Thursdays from 8am-11:30am  These hours are subject to change without notice.     The [facility] or [facility] will assist you with your medications when available. Not all medications are available and may be obtained through local pharmacies such as [place] [address] that has a large $4 list.     If your medications are new or have changed, and you get your medications from the [facility] [address] [place] [address]), you MUST talk to the pharmacy staff to sign the new prescription applications. If you don't sign the applications we cannot get the medications for you. It usually takes 6-8 weeks for your medications to arrive. The Pharmacy staff will call you when your medications are available. If you don't hear from them then you call [phone] and inquire about your medicines. You will have 30 days to come in and  your medications. If you don't  your medicines within those 30 days, those medicines may be placed on the self as samples and you will have to start all over again by completing the applications and waiting the 6-8 weeks for your medicines to arrive. Foot Care: Local [facility] through [facility] ([address] [facility])  Every second Tuesday of the month (except for holidays and election days) from 9am to 1 pm. The services provided by these ministry volunteers are free of charge with the option to donate. They will inspect your feet thoroughly, soak them for 10 minutes, cut and file your nails. They care for diabetics as well. Keep in mind this service is free and will be on a first come first serve basis. Bad teeth? Ask about the Dental Clinic to get you in front of a local dentist when a dentist is available. The bus leaves the second Thursday of the month for those on the list. (Ask about availability as these appointments are limited). DIABETES:   Do you have uncontrolled diabetes or you just want to learn more about your diabetes?  Schedule with the Nurse navigator for our new 5-week Diabetes program. You will learn how to properly manage your diabetes: nutrition, exercise, medication therapy. Eye exams for Diabetics. Please let us know so we can add you to the list to see the eye doctor at [facility]. These appointments are limited. You will receive a free eye exam and free glasses if needed. Unfortunately, if you are not a diabetic, we do not have a free service for eye exams for you (yet!). We do have information on where to go to get a huge discount on eye exams and glasses. Sick visits: If you are sick and it is not an emergency call the office to schedule an appointment to see the provider. Care in the office is FREE but charges will be applied if you go to the Emergency room. Charges and cost items from the Foundation:    Most of our orders are covered by [employer] 360 but there ARE SOME CHARGES for items such as radiology interpretations and anesthesiology during procedures and surgeries that are not covered under [facility] [employer]. Advanced Patient Advocacy (APA)   Please make sure you have contacted the APA group to check on your payment options: www. [email or web address] APA is available Mon - Fri 8-4pm at DR. [facility] on the first floor by the information desk. Their number is [phone]. It is important that you are screened in order to qualify for assistance and to avoid huge medical charges. The [facility] is not responsible for ANY charges you may accrue regardless of who ordered the medication, procedure, treatment or test. If you go to the Emergency Room, you WILL be charged! Behavior and emotional issues! It is stressful to be sick, have an illness, take medications, not have a job, not have medical insurance, have family issues or just getting older! Schedule an appointment with our mental health provider. She is in the office Mondays and Wednesdays from 8am to [address] [facility] can also contact the following:     The national suicide hotline (2-537-578-TAJK or 5-200.931.6823)    Sofy [address] [facility], [address] [place] [address] [facility] 40) - [address], [address] Gleason  [phone]    Drug and Alcohol Addiction Issues! It is hard to stop a poor habit but there is help out there. Please feel free to attend any other the following support groups to help you kick the habit or go to [facility] Emergency Department to be evaluated by the psychiatric team. Never give up!!     Esther meetings: [facility] MONDAY 10:30 AM [place] [address] 8:00  [address]

## 2018-06-26 NOTE — LETTER
6/26/2018 [address] [facility] [address] [facility], 1980, is picking up the following medications ordered from the [facility]. 
 
STOCK: PROVENTIL HFA QUANTITY: 1 Jareth Carmona Patient's Signature:_________________________________________________ Today's Date: 6/26/2018

## 2018-06-26 NOTE — PROGRESS NOTES
CELI SNEED [facility]  [address] [facility], [address] [place] [address] Avenue  [phone] office/[phone] fax      6/26/2018    Reason for visit:   Chief Complaint   Patient presents with    Follow Up Chronic Condition    Foot Pain     Patient c/i right foot pain level 7/10 since last night after stepping on garden hose       Patient: Jayna Bray, 1980, [id number]       Primary MD: Bambi Laureano NP    Subjective:   Jayna Bray, a 40 y.o. male, who presents for Follow Up Chronic Condition and Foot Pain (Patient c/i right foot pain level 7/10 since last night after stepping on garden hose)      Past Medical History:   Diagnosis Date    Asthma 1986    Chronic back pain     Chronic neck pain     Chronic obstructive pulmonary disease ([facility] 6/26/2018    Cluster headache     COPD (chronic obstructive pulmonary disease) ([facility] 01/09/2017    Mild Obstructive airway disease. See PFT results    Current smoker     Dental caries     Hypertriglyceridemia     Noncompliance with medication regimen 12/19/2016    Overuse of medication 12/19/2016    Scoliosis     Scoliosis of thoracic spine 12/19/2016    Shortness of breath 12/19/2016       No past surgical history on file. Social History     Social History    Marital status: SINGLE     Spouse name: N/A    Number of children: 0    Years of education: N/A     Occupational History    Not on file.      Social History Main Topics    Smoking status: Current Every Day Smoker     Packs/day: 0.80     Types: Cigarettes    Smokeless tobacco: Never Used    Alcohol use Yes    Drug use: No    Sexual activity: Not on file     Other Topics Concern     Service No    Blood Transfusions No    Caffeine Concern No    Occupational Exposure No    Hobby Hazards No    Sleep Concern Yes    Stress Concern No    Weight Concern No    Special Diet No    Back Care Yes     scoliosis    Exercise Yes    Bike Helmet No    Seat Belt Yes    Self-Exams No     Social History Narrative       No Known Allergies    Current Outpatient Prescriptions on File Prior to Visit   Medication Sig Dispense Refill    omega-3 acid ethyl esters (LOVAZA) 1 gram capsule Take 1 Cap by mouth daily (with breakfast). For triglycerides 90 Cap 3    albuterol (PROVENTIL HFA, VENTOLIN HFA, PROAIR HFA) 90 mcg/actuation inhaler Take 2 Puffs by inhalation every four (4) hours as needed for Wheezing or Shortness of Breath. 3 Inhaler 3    fluticasone-salmeterol (ADVAIR) 250-50 mcg/dose diskus inhaler Take 1 Puff by inhalation every twelve (12) hours. 3 Inhaler 3    Cholecalciferol, Vitamin D3, (VITAMIN D3) 1,000 unit cap Take  by mouth. No current facility-administered medications on file prior to visit. Review of Systems   Constitutional:        Im running out ventolin. I am taking the lovaza 2 caps a day. I know you told me 1 cap a day but I have so many of them I thought it would be ok to double up. HENT: Negative. Eyes: Negative. Respiratory:        I seem to be using the albuterol more now due to the weather changes. I dont like to get a lot of inhalers at one time as I feel like I over use the inhaler. Im down to a pack a week. Can I get Chantix to help me quit smoking? I had to cut lawn in the rain one day and I got sick. My family is sick too. I have a neb machine at home that I use but my solution is . I didn't know I could ask \"you\" for it. Cardiovascular: Negative. Gastrointestinal: Negative. Endocrine: Negative. Genitourinary: Negative. Musculoskeletal:        I finished cutting grass and I stepped on a water hose. I had ice on my right foot all night. That felt so good. Pain score with pressure on foot 10/10. Just walking pain score 7/10. Took Motrin 800mg this morning. I was going to go to the ED but my foot pain is so much better. I dont plan on working the rest of this week.  I am going to let my right foot heal.   Skin: Negative. Allergic/Immunologic: Positive for environmental allergies. Neurological: Negative. Hematological: Negative. Psychiatric/Behavioral:        I think I am hyperactive and my mind goes all the time because I cant sit still. Objective:   Visit Vitals    /82 (BP 1 Location: Left arm, BP Patient Position: Sitting)    Pulse 80    Temp 97.8 °F (36.6 °C) (Oral)    Resp 16    Ht 5' 11\" (1.803 m)    Wt 143 lb (64.9 kg)    SpO2 98%    BMI 19.94 kg/m2      Wt Readings from Last 3 Encounters:   06/26/18 143 lb (64.9 kg)   03/27/18 146 lb 3.2 oz (66.3 kg)   08/02/17 143 lb 3.2 oz (65 kg)     Lab Results   Component Value Date/Time    Glucose 95 03/01/2018 09:19 AM         Physical Exam   Constitutional: He is oriented to person, place, and time. He appears well-nourished. HENT:   Head: Atraumatic. Neck: Neck supple. Cardiovascular: Normal rate, regular rhythm and normal heart sounds. Pulmonary/Chest: Effort normal and breath sounds normal.   Musculoskeletal:   Pt is ambulating with crutches due to right foot injury. No swelling or bruising noted to right foot. Pt is able to move all toes w/o difficulty. Neurological: He is alert and oriented to person, place, and time. Skin: Skin is warm and dry. Psychiatric: He has a normal mood and affect. Pt always appears hyperactive. Perfect example of ADD. Assessment:    Richard Willis who has risk factors including (see above previous medical hx) and:       ICD-10-CM ICD-9-CM    1. Chronic obstructive pulmonary disease, unspecified COPD type (HCC) J44.9 496 albuterol-ipratropium (DUO-NEB) 2.5 mg-0.5 mg/3 ml nebu   2. Environmental allergies Z91.09 V15.09    3. Soft tissue injury of right foot, initial encounter S99.921A 892.0 ibuprofen (MOTRIN) 800 mg tablet   4. Hyperactive F90.9 314.9    5.  Current smoker F17.200 305.1 albuterol-ipratropium (DUO-NEB) 2.5 mg-0.5 mg/3 ml nebu      varenicline (CHANTIX STARTER JONA) 0.5 mg (11)- 1 mg (42) DsPk      varenicline (CHANTIX) 1 mg tablet   6. Compliance with medication regimen Z91.89 V49.89      1. Chronic obstructive pulmonary disease, unspecified COPD type ([facility] [address]  Pt cont to smoke which irritated his COPD. Pt is also noncompliant with obtaining his needed inhalers. - albuterol-ipratropium (DUO-NEB) 2.5 mg-0.5 mg/3 ml nebu; 3 mL by Nebulization route every six (6) hours as needed. For COPD exacerbation  Dispense: 27 Nebule; Refill: 1    2. Environmental allergies      3. Soft tissue injury of right foot, initial encounter  Soft tissue injuries can take many weeks to heal. Instructed pt to take Motrin 800mg every 6 hours as needed to assist with decreasing inflammation to right ankle. Motrin at this strength taken for long periods of time can affect your stomach so don't exceed the dose as directed. Also instructed pt to elevate right foot at night and rest the right foot as constant agitation from working the foot may interfere with healing.     - ibuprofen (MOTRIN) 800 mg tablet; Take 1 Tab by mouth every six (6) hours as needed for Pain. Dispense: 15 Tab; Refill: 0    4. Hyperactive  Informed pt he will need to locate and pay out of pocket to see a psychiatrist for his possible ADD/ADHD issues. Pt verb understanding      5. Current smoker  Counseled patient on the dangers of tobacco use, and was advised to quit. Reviewed strategies to maximize success, including the use of Chantix. Discussed the risks of continued tobacco use such as elevated blood pressure, vascular irritation with increased incidence of CVD with stroke or MI and PVD causing claudication, lung damage that could lead to COPD, cancer and death.   Encouraged an approach to find a few healthy habits, write them down then plan to decrease their cigarette use by one each week till they are gone all together and to plan for stress that may cause them to want to restart and how to prevent it by having new coping mechanisms in place. 1-800-QUIT-NOW provided for counseling     Chantix discussion in detail. The patient was able to verbalize understanding of the known side effects of mood change, dry mouth, vivid or bad dreams and warning signs such as suicidal or homicidal ideation, rash, difficulty breathing as reasons to stop it. Medication schedule provided and reviewed. - albuterol-ipratropium (DUO-NEB) 2.5 mg-0.5 mg/3 ml nebu; 3 mL by Nebulization route every six (6) hours as needed. For COPD exacerbation  Dispense: 27 Nebule; Refill: 1  - varenicline (CHANTIX STARTER JONA) 0.5 mg (11)- 1 mg (42) DsPk; Follow instructions on box  Dispense: 1 Dose Pack; Refill: 0  - varenicline (CHANTIX) 1 mg tablet; Take 1 tab twice a day  Indications: Smoking Cessation  Dispense: 112 Tab; Refill: 3    6. Compliance with medication regimen  Instructed pt on the importance of taking medications as prescribed. Pt has not obtained any albuterol inhalers from [facility] since April despite medication being available in [facility]. Written instructions followed our verbal discussion of all information discussed above, pending tests ordered and future goals/plans. Patient expressed understanding of current diagnosis, planned testing, follow up and if needed to contact the office for any questions or concerns prior to the next visit. Plan:   Med reconciliation completed with patient. Reviewed side effects of medications with the patient. Questions were answered and patient verb understanding. Discussed lab results with patient  none    Orders Placed This Encounter    albuterol-ipratropium (DUO-NEB) 2.5 mg-0.5 mg/3 ml nebu     Sig: 3 mL by Nebulization route every six (6) hours as needed. For COPD exacerbation     Dispense:  30 Nebule     Refill:  1    ibuprofen (MOTRIN) 800 mg tablet     Sig: Take 1 Tab by mouth every six (6) hours as needed for Pain.      Dispense:  15 Tab     Refill:  0    varenicline (CHANTIX STARTER JONA) 0.5 mg (11)- 1 mg (42) DsPk     Sig: Follow instructions on box     Dispense:  1 Dose Pack     Refill:  0    varenicline (CHANTIX) 1 mg tablet     Sig: Take 1 tab twice a day  Indications: Smoking Cessation     Dispense:  112 Tab     Refill:  3     Current Outpatient Prescriptions   Medication Sig Dispense Refill    albuterol-ipratropium (DUO-NEB) 2.5 mg-0.5 mg/3 ml nebu 3 mL by Nebulization route every six (6) hours as needed. For COPD exacerbation 30 Nebule 1    ibuprofen (MOTRIN) 800 mg tablet Take 1 Tab by mouth every six (6) hours as needed for Pain. 15 Tab 0    varenicline (CHANTIX STARTER JONA) 0.5 mg (11)- 1 mg (42) DsPk Follow instructions on box 1 Dose Pack 0    varenicline (CHANTIX) 1 mg tablet Take 1 tab twice a day  Indications: Smoking Cessation 112 Tab 3    omega-3 acid ethyl esters (LOVAZA) 1 gram capsule Take 1 Cap by mouth daily (with breakfast). For triglycerides 90 Cap 3    albuterol (PROVENTIL HFA, VENTOLIN HFA, PROAIR HFA) 90 mcg/actuation inhaler Take 2 Puffs by inhalation every four (4) hours as needed for Wheezing or Shortness of Breath. 3 Inhaler 3    fluticasone-salmeterol (ADVAIR) 250-50 mcg/dose diskus inhaler Take 1 Puff by inhalation every twelve (12) hours. 3 Inhaler 3    Cholecalciferol, Vitamin D3, (VITAMIN D3) 1,000 unit cap Take  by mouth. Follow-up Disposition:  Return in about 3 months (around 9/26/2018). No labs needed for 3 month follow-up appt    Discussed no show policy with patient who states he verb understanding. Jas Chopra. Leandro, RN, MSN, [employer]   [facility]      I spent 30 minutes with the patient in face-to-face consultation, of which greater than 50% was spent in counseling and coordination of care as described above.

## 2018-06-26 NOTE — MR AVS SNAPSHOT
[address] [facility] [id number] [phone] Patient: Jonnie Brunner MRN: [id number] Visit Information Date & Time Provider Department Dept. Phone Encounter #  
 6/26/2018  1:30 PM Vasu Demarco NP [address] [facility] [id number] Follow-up Instructions Return in about 3 months (around 9/26/2018). Your Appointments 9/25/2018  1:00 PM  
Follow Up with Vasu Demarco NP [address] [facility] ([facility]) Appt Note: 3 mth follow up/ No labs needed [address] [facility] [address]  
[address] [facility] [id number] Upcoming Health Maintenance Date Due Influenza Age 5 to Adult 8/1/2018 DTaP/Tdap/Td series (2 - Td) 8/2/2027 Allergies as of 6/26/2018  Review Complete On: 6/26/2018 By: Sourav Diaz. Vicente Lyn LPN No Known Allergies Current Immunizations  Never Reviewed No immunizations on file. Not reviewed this visit You Were Diagnosed With   
  
 Codes Comments Current smoker    -  Primary ICD-10-CM: F17.200 ICD-9-CM: 305.1 Environmental allergies     ICD-10-CM: Z91.09 
ICD-9-CM: V15.09 Soft tissue injury of right foot, initial encounter     ICD-10-CM: J09.500U ICD-9-CM: 892.0 Chronic obstructive pulmonary disease, unspecified COPD type ([facility]     ICD-10-CM: J44.9 ICD-9-CM: 120 Hyperactive     ICD-10-CM: F90.9 ICD-9-CM: 314.9 Vitals BP Pulse Temp Resp Height(growth percentile) Weight(growth percentile) 119/82 (BP 1 Location: Left arm, BP Patient Position: Sitting) 80 97.8 °F (36.6 °C) (Oral) 16 5' 11\" (1.803 m) 143 lb (64.9 kg) SpO2 BMI Smoking Status 98% 19.94 kg/m2 Current Every Day Smoker Vitals History BMI and BSA Data Body Mass Index Body Surface Area 19.94 kg/m 2 1.8 m 2 Preferred Pharmacy Pharmacy Name Phone [address] [place] [address]. [phone] Your Updated Medication List  
  
   
This list is accurate as of 6/26/18  1:48 PM.  Always use your most recent med list.  
  
  
  
  
 albuterol 90 mcg/actuation inhaler Commonly known as:  PROVENTIL HFA, VENTOLIN HFA, PROAIR HFA Take 2 Puffs by inhalation every four (4) hours as needed for Wheezing or Shortness of Breath. albuterol-ipratropium 2.5 mg-0.5 mg/3 ml Nebu Commonly known as:  DUO-NEB  
3 mL by Nebulization route every six (6) hours as needed. For COPD exacerbation  
  
 fluticasone-salmeterol 250-50 mcg/dose diskus inhaler Commonly known as:  ADVAIR Take 1 Puff by inhalation every twelve (12) hours. ibuprofen 800 mg tablet Commonly known as:  MOTRIN Take 1 Tab by mouth every six (6) hours as needed for Pain. omega-3 acid ethyl esters 1 gram capsule Commonly known as:  Lendon Bent Take 1 Cap by mouth daily (with breakfast). For triglycerides * varenicline 0.5 mg (11)- 1 mg (42) Dspk Commonly known as:  CHANTIX STARTER JONA Follow instructions on box * varenicline 1 mg tablet Commonly known as:  Prachi Mccall Take 1 tab twice a day  Indications: Smoking Cessation VITAMIN D3 1,000 unit Cap Generic drug:  cholecalciferol Take  by mouth. * Notice: This list has 2 medication(s) that are the same as other medications prescribed for you. Read the directions carefully, and ask your doctor or other care provider to review them with you. Prescriptions Printed Refills  
 varenicline (CHANTIX STARTER JONA) 0.5 mg (11)- 1 mg (42) DsPk 0 Sig: Follow instructions on box Class: Program  
 varenicline (CHANTIX) 1 mg tablet 3 Sig: Take 1 tab twice a day  Indications: Smoking Cessation Class: Program  
  
Prescriptions Sent to Mail Order Refills varenicline (CHANTIX STARTER JONA) 0.5 mg (11)- 1 mg (42) DsPk 0 Sig: Follow instructions on box Class: Program  
 Pharmacy: [facility] DR JAMES GARCIA [address] [facility], [address] [place] [address]. Ph #: [phone]  
 varenicline (CHANTIX) 1 mg tablet 3 Sig: Take 1 tab twice a day  Indications: Smoking Cessation Class: Program  
 Pharmacy: [facility] DR JAMES GARCIA [address] [place] [address], [address] [place] [address]. Ph #: [phone] Prescriptions Sent to Pharmacy Refills  
 albuterol-ipratropium (DUO-NEB) 2.5 mg-0.5 mg/3 ml nebu 1 Sig: 3 mL by Nebulization route every six (6) hours as needed. For COPD exacerbation Class: Normal  
 Pharmacy: [facility] DR JAMES GARCIA [address] [place] [address]. Ph #: [phone] Route: Nebulization  
 ibuprofen (MOTRIN) 800 mg tablet 0 Sig: Take 1 Tab by mouth every six (6) hours as needed for Pain. Class: Normal  
 Pharmacy: [facility] DR JAMES GARCIA [address] [place] [address]. Ph #: [phone] Route: Oral  
 varenicline (CHANTIX STARTER JONA) 0.5 mg (11)- 1 mg (42) DsPk 0 Sig: Follow instructions on box Class: Program  
 Pharmacy: [facility] DR JAMES GARCIA [address] [place], [place] [address], [address] [place] [address]. Ph #: [phone]  
 varenicline (CHANTIX) 1 mg tablet 3 Sig: Take 1 tab twice a day  Indications: Smoking Cessation Class: Program  
 Pharmacy: [facility] DR JAMES GARCIA [address] [place] [address], [address] [place] [address]. Ph #: [phone] Follow-up Instructions Return in about 3 months (around 9/26/2018). Patient Instructions [facility] reminders! Foundation Operating Hours: These may change without notice. Mon- Wed 7am to 5pm. Closed for lunch 12-1pm 
Thurs 7am to 12pm 
Fridays closed Office number:  **In case of inclement weather (snow and/or ice) please do not try to come into the office for your appointment. Please call in and you will not be counted as a \"No Show. \" SAFETY FIRST!!** 
 
 NO SHOW POLICY ~ If a patient has 3 no shows for an appointment with the Provider, Mental Health Provider, or the Nurse Navigator, they will be discharged from the practice for 6 months. Medication ordering will also be suspended. If the patient is discharged from the [facility], they can go to the [facility] or [facility] on [facility] where they can be seen for their primary care needs plus obtain the same type of medications as they received at the [facility]. To avoid being discharged the patient must call the office at [phone] 24 hours prior to their appointment if they need to cancel or reschedule, arrive to their appointments on time (preferrably 15 minutes early) (If you are laste you will not be seen) and come to all scheduled appointments with the provider, mental health, and/or nurse navigator. If the patient is discharged from the practice, they can apply to be re-established after 6 months. Lab work:   
Unless you are instructed differently, please return to the office between the hours of 7 am and 10:30 am Monday through Thursday to have your labs drawn one to two weeks before your next scheduled PROVIDER appointment. If you do not have an appointment to follow up on these results, please make one or plan to call the office if you do not hear from us to get the results. No news does not mean good news. Medications:  
Medication  times are firm: 
Mondays and Tuesdays from 1pm-5pm 
Wednesdays and Thursdays from 8am-11:30am 
These hours are subject to change without notice. The [facility] or [facility] will assist you with your medications when available.  Not all medications are available and may be obtained through local pharmacies such as [place] [address] that has a large $4 list.  
 
If your medications are new or have changed, and you get your medications from the [facility] [address] [place] [address]), you MUST talk to the pharmacy staff to sign the new prescription applications. If you don't sign the applications we cannot get the medications for you. It usually takes 6-8 weeks for your medications to arrive. The Pharmacy staff will call you when your medications are available. If you don't hear from them then you call [phone] and inquire about your medicines. You will have 30 days to come in and  your medications. If you don't  your medicines within those 30 days, those medicines may be placed on the self as samples and you will have to start all over again by completing the applications and waiting the 6-8 weeks for your medicines to arrive. Foot Care: Local [facility] through [facility] ([address]) Every second Tuesday of the month (except for holidays and election days) from 9am to 1 pm. The services provided by these ministry volunteers are free of charge with the option to donate. They will inspect your feet thoroughly, soak them for 10 minutes, cut and file your nails. They care for diabetics as well. Keep in mind this service is free and will be on a first come first serve basis. Bad teeth? Ask about the Dental Clinic to get you in front of a local dentist when a dentist is available. The bus leaves the second Thursday of the month for those on the list. (Ask about availability as these appointments are limited). DIABETES:  
Do you have uncontrolled diabetes or you just want to learn more about your diabetes? Schedule with the Nurse navigator for our new 5-week Diabetes program. You will learn how to properly manage your diabetes: nutrition, exercise, medication therapy. Eye exams for Diabetics. Please let us know so we can add you to the list to see the eye doctor at [facility]. These appointments are limited. You will receive a free eye exam and free glasses if needed. Unfortunately, if you are not a diabetic, we do not have a free service for eye exams for you (yet!).   We do have information on where to go to get a huge discount on eye exams and glasses. Sick visits: If you are sick and it is not an emergency call the office to schedule an appointment to see the provider. Care in the office is FREE but charges will be applied if you go to the Emergency room. Charges and cost items from the Foundation: Most of our orders are covered by [address] but there ARE SOME CHARGES for items such as radiology interpretations and anesthesiology during procedures and surgeries that are not covered under [facility]. [employer] Patient [employer] (APA) Please make sure you have contacted the APA group to check on your payment options: www. [email or web address]. APA is available Mon - Fri 8-4pm at [facility] on the first floor by the information desk. Their number is [phone]. It is important that you are screened in order to qualify for assistance and to avoid huge medical charges. The [facility] is not responsible for ANY charges you may accrue regardless of who ordered the medication, procedure, treatment or test. If you go to the Emergency Room, you WILL be charged! Behavior and emotional issues! It is stressful to be sick, have an illness, take medications, not have a job, not have medical insurance, have family issues or just getting older! Schedule an appointment with our mental health provider. She is in the office Mondays and Wednesdays from 8am to [facility] can also contact the following: The national suicide hotline (9-826-724-PQGZ or 9-730.368.4994) [address] 
[phone] Community Services Board (CSB) [place] [address] Domingo Gleason 
[phone] Drug and Alcohol Addiction Issues! It is hard to stop a poor habit but there is help out there.  Please feel free to attend any other the following support groups to help you kick the habit or go to [facility] to be evaluated by the psychiatric team. Never give up!! Esther meetings: [place] MONDAY 10:30  [place] [address] [facility] SATURDAY 8:00 PM [facility] SATURDAY NIGHT AFELISE Woodward [facility] [facility] Please provide this summary of care documentation to your next provider. Your primary care clinician is listed as Alvaro Rivera. If you have any questions after today's visit, please call [phone].

## 2018-07-03 ENCOUNTER — TELEPHONE (OUTPATIENT)
Dept: FAMILY MEDICINE CLINIC | Age: 38
End: 2018-07-03

## 2018-07-03 NOTE — TELEPHONE ENCOUNTER
Medication: Ventolin HFA, dose: 2 puffs, how often: every 4 hours as needed, current number of medication days provided: 90, refill per application. Lot #: [id number], EXP.02/20. This medication was received and verified for the following 1. Correct Patient, 2. Correct Diagnosis, 3. Correct Drug, 4. Correct route, and no current allergy to medication. Please contact patient to come  their medications.      Jadiel Barbosa, MSN,RN,[facility]

## 2018-07-17 ENCOUNTER — TELEPHONE (OUTPATIENT)
Dept: FAMILY MEDICINE CLINIC | Age: 38
End: 2018-07-17

## 2018-07-17 NOTE — TELEPHONE ENCOUNTER
Medication: Advair 250/50, dose: 1 inhalation, how often: twice daily, current number of medication days provided: 90, refill per application. Lot #: [id number], EXP.09/19. This medication was received and verified for the following 1. Correct Patient, 2. Correct Diagnosis, 3. Correct Drug, 4. Correct route, and no current allergy to medication.

## 2018-07-25 ENCOUNTER — TELEPHONE (OUTPATIENT)
Dept: FAMILY MEDICINE CLINIC | Age: 38
End: 2018-07-25

## 2018-07-26 NOTE — TELEPHONE ENCOUNTER
Medication: Chantix starter pack, Day 1-3: take 1/2 tab every day. Day 4-7: take 1/2 tab BID. Day 8-28: take 1 tab BID. This will complete the starter pack. Pt to then start the maintenance dose. Lot #: [id number], EXP.08/19. This medication was received and verified for the following 1. Correct Patient, 2. Correct Diagnosis, 3. Correct Drug, 4. Correct route, and no current allergy to medication. Medication: Chantix 1mg , dose: 1 tab, how often: bid , current number of medication days provided: 90, refill per application. Lot #: [id number], EXP.05/20. This medication was received and verified for the following 1. Correct Patient, 2. Correct Diagnosis, 3. Correct Drug, 4. Correct route, and no current allergy to medication. Please contact patient to come  their medications.      Conrad Rivera, MSN,RN,[facility]

## 2018-08-29 ENCOUNTER — TELEPHONE (OUTPATIENT)
Dept: FAMILY MEDICINE CLINIC | Age: 38
End: 2018-08-29

## 2018-08-30 NOTE — TELEPHONE ENCOUNTER
Medication: Chantix 1mg , dose: 1 tab, how often: bid , current number of medication days provided: 90, refill per application. Lot #: [id number], EXP 05/2020. This medication was received and verified for the following 1. Correct Patient, 2. Correct Diagnosis, 3. Correct Drug, 4. Correct route, and no current allergy to medication. Please contact patient to come  their medications. Desirae Hartman MSN,RN,[facility], inquire with patient that he has completed the starter pack before starting the 1mg tabs BID.  Thank you

## 2018-09-26 ENCOUNTER — HOSPITAL ENCOUNTER (EMERGENCY)
Age: 38
Discharge: HOME | End: 2018-09-26
Attending: EMERGENCY MEDICINE
Payer: SUBSIDIZED

## 2018-09-26 VITALS
WEIGHT: 155 LBS | SYSTOLIC BLOOD PRESSURE: 129 MMHG | OXYGEN SATURATION: 99 % | TEMPERATURE: 98 F | RESPIRATION RATE: 15 BRPM | BODY MASS INDEX: 21.62 KG/M2 | DIASTOLIC BLOOD PRESSURE: 81 MMHG | HEART RATE: 85 BPM

## 2018-09-26 DIAGNOSIS — K02.9 PAIN DUE TO DENTAL CARIES: ICD-10-CM

## 2018-09-26 DIAGNOSIS — K02.9 DENTAL DECAY: Primary | ICD-10-CM

## 2018-09-26 PROCEDURE — 99283 EMERGENCY DEPT VISIT LOW MDM: CPT

## 2018-09-26 RX ORDER — HYDROCODONE BITARTRATE AND ACETAMINOPHEN 5; 325 MG/1; MG/1
1 TABLET ORAL
Qty: 10 TAB | Refills: 0 | Status: SHIPPED | OUTPATIENT
Start: 2018-09-26 | End: 2019-02-05

## 2018-09-26 RX ORDER — CHLORHEXIDINE GLUCONATE 1.2 MG/ML
15 RINSE ORAL 2 TIMES DAILY
Qty: 420 ML | Refills: 0 | Status: SHIPPED | OUTPATIENT
Start: 2018-09-26 | End: 2018-10-10

## 2018-09-26 RX ORDER — IBUPROFEN 600 MG/1
600 TABLET ORAL
Qty: 20 TAB | Refills: 0 | Status: SHIPPED | OUTPATIENT
Start: 2018-09-26 | End: 2021-03-13 | Stop reason: SDUPTHER

## 2018-09-26 RX ORDER — PENICILLIN V POTASSIUM 500 MG/1
500 TABLET, FILM COATED ORAL 4 TIMES DAILY
Qty: 40 TAB | Refills: 0 | Status: SHIPPED | OUTPATIENT
Start: 2018-09-26 | End: 2018-10-06

## 2018-09-26 NOTE — ED PROVIDER NOTES
HPI Comments:  
40 y.o. male presents to ED C/O dental pain. Patient reports worsening dental pain to front left jaw for 5 days. Patient no recent injury but has had worsening pain, was taking BC powder no longer helping. Patient admits swelling and pain is above a tooth which is decayed to gumline. Patient seen previously for dental complaint and has not followed up. Patient denies facial erythema, drainage, trismus, fever. Patient smokes 1/4ppd. Patient denies any other symptoms or complaints. The history is provided by the patient. History limited by: no language barrier Past Medical History:  
Diagnosis Date [address] [facility]  Chronic back pain  Chronic neck pain  Chronic obstructive pulmonary disease ([facility] [address] 6/26/2018  Cluster headache  COPD (chronic obstructive pulmonary disease) ([facility] 01/09/2017 Mild Obstructive airway disease. See PFT results  Current smoker  Dental caries  Hypertriglyceridemia  Noncompliance with medication regimen 12/19/2016  Overuse of medication 12/19/2016  Scoliosis  Scoliosis of thoracic spine 12/19/2016  Shortness of breath 12/19/2016 History reviewed. No pertinent surgical history. Family History:  
Problem Relation Age of Onset  Heart Attack Father  Hypertension Father  Migraines Father Social History Social History  Marital status: SINGLE Spouse name: N/A  
 Number of children: 0  
 Years of education: N/A Occupational History  Not on file. Social History Main Topics  Smoking status: Current Every Day Smoker Packs/day: 0.80 Types: Cigarettes  Smokeless tobacco: Never Used  Alcohol use Yes  Drug use: No  
 Sexual activity: Not on file Other Topics Concern   Service No  
 Blood Transfusions No  
 Caffeine Concern No  
 Occupational Exposure No  
 Hobby Hazards No  
 Sleep Concern Yes  Stress Concern No  
 Weight Concern No  
  Special Diet No  
 Back Care Yes  
  scoliosis  Exercise Yes  Bike Helmet No  
 Seat Belt Yes  Self-Exams No  
 
Social History Narrative ALLERGIES: Review of patient's allergies indicates no known allergies. Review of Systems Constitutional: Negative for activity change, appetite change, chills, fatigue and fever. HENT: Positive for dental problem. Negative for congestion, ear pain, rhinorrhea and sore throat. Eyes: Negative for pain, discharge, redness and itching. Respiratory: Negative for cough, chest tightness, shortness of breath and wheezing. Cardiovascular: Negative for chest pain and palpitations. Gastrointestinal: Negative for abdominal pain, blood in stool, constipation, diarrhea, nausea and vomiting. Endocrine: Negative for polyuria. Genitourinary: Negative for discharge, dysuria, flank pain, hematuria, penile pain and testicular pain. Musculoskeletal: Negative for back pain, joint swelling and neck pain. Skin: Negative for rash and wound. Allergic/Immunologic: Negative for immunocompromised state. Neurological: Negative for dizziness, weakness, light-headedness, numbness and headaches. Hematological: Negative for adenopathy. Psychiatric/Behavioral: Negative for agitation and confusion. The patient is not nervous/anxious. All other systems reviewed and are negative. Vitals:  
 09/26/18 1319 09/26/18 1334 BP: 129/81 Pulse: 85 Resp: 15 Temp: 98 °F (36.7 °C) SpO2: 99% 99% Weight: 70.3 kg (155 lb) Physical Exam  
Constitutional: He is oriented to person, place, and time. He appears well-developed and well-nourished. Patient appears uncomfortable. HENT:  
Mouth/Throat: Oropharynx is clear and moist. No trismus in the jaw. Dental caries present. Neck: Normal range of motion. Neck supple. Cardiovascular: Normal rate, regular rhythm and normal heart sounds. Pulmonary/Chest: Effort normal and breath sounds normal. No respiratory distress. He has no wheezes. He has no rales. Musculoskeletal: Normal range of motion. Lymphadenopathy:  
  He has no cervical adenopathy. Neurological: He is alert and oriented to person, place, and time. He exhibits normal muscle tone. Coordination normal.  
Skin: Skin is warm and dry. No rash noted. He is not diaphoretic. No erythema. No pallor. Nursing note and vitals reviewed. MDM Number of Diagnoses or Management Options Dental decay:  
Pain due to dental caries:  
Diagnosis management comments: Will treat for acute dental infection due to new onset of pain and swelling. No recent antibiotics, will prescribe PCN and peridex and short course of pain medication.  as met with patient and educated about dental clinics. Patient is well appearing, tolerating PO, appropriate for discharge home. Patient educated to return to the ED for any new or worsening symptoms. Patient denies questions. 2:03 PM  reviewed, no narcotics since 2/2018 ED Course Procedures DISCHARGE NOTE: 
 
Frankie Hammond's  results have been reviewed with him. He has been counseled regarding his diagnosis, treatment, and plan. He verbally conveys understanding and agreement of the signs, symptoms, diagnosis, treatment and prognosis and additionally agrees to follow up as discussed. He also agrees with the care-plan and conveys that all of his questions have been answered. I have also provided discharge instructions for him that include: educational information regarding their diagnosis and treatment, and list of reasons why they would want to return to the ED prior to their follow-up appointment, should his condition change. CLINICAL IMPRESSION: 
 
1. Dental decay 2. Pain due to dental caries AFTER VISIT PLAN: 
 
Discharge Medication List as of 9/26/2018  2:09 PM  
  
 START taking these medications Details  
penicillin v potassium (VEETID) 500 mg tablet Take 1 Tab by mouth four (4) times daily for 10 days. , Print, Disp-40 Tab, R-0  
  
HYDROcodone-acetaminophen (NORCO) 5-325 mg per tablet Take 1 Tab by mouth every four (4) hours as needed for Pain. Max Daily Amount: 6 Tabs., Print, Disp-10 Tab, R-0  
  
chlorhexidine (PERIDEX) 0.12 % solution 15 mL by Swish and Spit route two (2) times a day for 14 days. , Print, Disp-420 mL, R-0  
  
  
CONTINUE these medications which have CHANGED Details  
ibuprofen (MOTRIN) 600 mg tablet Take 1 Tab by mouth every six (6) hours as needed for Pain., Print, Disp-20 Tab, R-0  
  
  
CONTINUE these medications which have NOT CHANGED Details  
albuterol-ipratropium (DUO-NEB) 2.5 mg-0.5 mg/3 ml nebu 3 mL by Nebulization route every six (6) hours as needed. For COPD exacerbation, Normal, Disp-30 Nebule, R-1  
  
omega-3 acid ethyl esters (LOVAZA) 1 gram capsule Take 1 Cap by mouth daily (with breakfast). For triglycerides, Program, Disp-90 Cap, R-3  
  
albuterol (PROVENTIL HFA, VENTOLIN HFA, PROAIR HFA) 90 mcg/actuation inhaler Take 2 Puffs by inhalation every four (4) hours as needed for Wheezing or Shortness of Breath., Program, Disp-3 Inhaler, R-3  
  
fluticasone-salmeterol (ADVAIR) 250-50 mcg/dose diskus inhaler Take 1 Puff by inhalation every twelve (12) hours. , Program, Disp-3 Inhaler, R-3 Cholecalciferol, Vitamin D3, (VITAMIN D3) 1,000 unit cap Take  by mouth., Historical Med Follow-up Information Follow up With Details Comments Contact Info Brie Stinson NP Schedule an appointment as soon as possible for a visit in 3 days As needed [address] [facility] 
[phone] [facility] Schedule an appointment as soon as possible for a visit in 3 days Further evaluation [address] [place] [id number] [phone] Written by Elsie BECKWITH

## 2018-09-26 NOTE — ED TRIAGE NOTES
Patient arrived from home dental and gum pain. Patient states pain has been slowly getting worse. Patient states he does not have a dentist or insurance. Patient rates pain 10/10

## 2018-09-26 NOTE — DISCHARGE INSTRUCTIONS
Tooth Decay: Care Instructions  Your Care Instructions    Tooth decay is damage to a tooth caused by plaque. Plaque is a thin film of bacteria that sticks to the teeth above and below the gum line. If plaque isn't removed from the teeth, it can build up and harden into tartar. The bacteria in plaque and tartar use sugars in food to make acids. These acids can cause tooth decay and gum disease. Any part of your tooth can decay, from the roots below the gum line to the chewing surface. Decay can affect the outer layer (enamel) or inner layer (dentin) of your teeth. The deeper the decay, the worse the damage. Untreated tooth decay will get worse and may lead to tooth loss. If you have a small hole (cavity) in your tooth, your dentist can repair it by removing the decay and filling the hole. If you have deeper decay, you may need more treatment. A very badly damaged tooth may have to be removed. Follow-up care is a key part of your treatment and safety. Be sure to make and go to all appointments, and call your dentist if you are having problems. It's also a good idea to know your test results and keep a list of the medicines you take. How can you care for yourself at home? If you have pain:  · Take an over-the-counter pain medicine, such as acetaminophen (Tylenol), ibuprofen (Advil, Motrin), or naproxen (Aleve). Be safe with medicines. Read and follow all instructions on the label. ¨ Do not take two or more pain medicines at the same time unless the doctor told you to. Many pain medicines have acetaminophen, which is Tylenol. Too much acetaminophen (Tylenol) can be harmful. · Put ice or a cold pack on your cheek over the tooth for 10 to 15 minutes at a time. Put a thin cloth between the ice and your skin. To prevent tooth decay  · Brush teeth twice a day, and floss once a day. Brushing with fluoride toothpaste and flossing may be enough to reverse early decay.   · Use a toothbrush with soft, rounded-end bristles and a head that is small enough to reach all parts of your teeth and mouth. Replace your toothbrush every 3 or 4 months. You may also use an electric toothbrush that has rotating and oscillating (back-and-forth) action. · Ask your dentist about having fluoride treatments at the dental office. · Brush your tongue to help get rid of bacteria. · Eat healthy foods that include whole grains, vegetables, and fruits. · Have your teeth cleaned by a professional at least two times a year. · Do not smoke or use smokeless tobacco. Tobacco can make tooth decay worse. When should you call for help? Call 911 anytime you think you may need emergency care. For example, call if:    · You have trouble breathing.    Call your dentist now or seek immediate medical care if:    · You have new or worse symptoms of infection, such as:  ¨ Increased pain, swelling, warmth, or redness. ¨ Red streaks leading from the area. ¨ Pus draining from the area. ¨ A fever.    Watch closely for changes in your health, and be sure to contact your doctor if:    · You do not get better as expected. Where can you learn more? Go to http://neptali-abhinav.info/. Enter O647 in the search box to learn more about \"Tooth Decay: Care Instructions. \"  Current as of: May 12, 2017  Content Version: 11.7  © 5864-6162 [employer]. Care instructions adapted under license by [employer] (which disclaims liability or warranty for this information). If you have questions about a medical condition or this instruction, always ask your healthcare professional. [place] [address] any warranty or liability for your use of this information. Tooth Extraction: Care Instructions  Your Care Instructions  Tooth extraction is the complete removal of a tooth, from the part of the tooth that you can see to the roots that are in the jawbone.  Damage caused by tooth decay is the most common reason for a tooth's extraction. Other reasons for removing a tooth include infection or injury. Removing the tooth can help keep an infection from spreading to other parts of the mouth. And some teeth may be removed to prevent or correct crowding in the mouth. Your dentist or an oral surgeon, who specializes in surgeries of the mouth, can remove a tooth. It can be done in the dentist's or oral surgeon's office. The dentist first numbs the area around the tooth. You may also get medicine to help you relax. The dentist uses a special tool to grasp the tooth and lift it out of the tooth socket. You may feel a tug on the tooth as it is being removed. If the tooth breaks while being pulled, or if it doesn't come out in one piece, the dentist uses other tools to remove the rest of the tooth. After the tooth comes out, you will be given a piece of gauze to bite down on. This will help stop bleeding. You may need stitches. You will be told if and when you should come back to have the stitches removed. You may have some pain, bleeding, or swelling afterward. The dentist may give you medicine for pain. The pain should steadily decrease in the days after the extraction. A blood clot will form in the tooth socket after the extraction. The clot protects the bone during healing. If that blood clot gets loose or comes out of the socket, you may have a dry socket, which exposes the bone. A dry socket may last for several days and can cause severe pain. If you get a dry socket, your dentist can treat it with medicine. You and your dentist may want to discuss options to replace the removed tooth. Options include an implant, a denture, or a bridge. Follow-up care is a key part of your treatment and safety. Be sure to make and go to all appointments, and call your dentist if you are having problems. It's also a good idea to know your test results and keep a list of the medicines you take.   How can you care for yourself at home?  · While your mouth is numb, be careful not to bite your tongue or the inside of your cheek or lip. · Be safe with medicines. Read and follow all instructions on the label. ¨ If the dentist gave you a prescription medicine for pain, take it as prescribed. ¨ If you are not taking a prescription pain medicine, ask your dentist if you can take an over-the-counter medicine. · If your dentist prescribed antibiotics, take them as directed. Do not stop taking them just because you feel better. You need to take the full course of antibiotics. · If you have bleeding, bite gently on a gauze pad. Change the pad as it becomes soaked with blood. · After 24 hours, rinse your mouth gently with warm salt water several times a day. Your dentist may recommend other mouth rinses if needed. Do not rinse hard. This can loosen the blood clot and delay healing. · Avoid rubbing the area with your tongue. And don't use a straw for the first few days. Both of these actions can loosen the blood clot and delay healing. · Avoid chewing in the area where the tooth was removed until your mouth heals. Soft foods like gelatin or soup might be easier to eat and may help you heal.  · If needed, put ice or a cold pack on your cheek for 10 to 20 minutes at a time. Try to do this every 1 to 2 hours for the next 3 days (when you are awake) or until the swelling goes down. Put a thin cloth between the ice and your skin. · Do not smoke or use spit tobacco for at least 24 hours after the extraction. Tobacco use can delay healing. When should you call for help? Call 911 anytime you think you may need emergency care.  For example, call if:    · You passed out (lost consciousness).     · You have trouble breathing.    Call your dentist now or seek immediate medical care if:    · You have pain that does not get better after you take pain medicine.     · You have loose stitches, or your incision comes open.     · You have new or more bleeding from the site.     · You have signs of infection, such as:  ¨ Increased pain, swelling, warmth, or redness. ¨ Red streaks leading from the area. ¨ Pus draining from the area. ¨ A fever.    Watch closely for changes in your health, and be sure to contact your dentist if you have any problems. Where can you learn more? Go to http://neptali-abhinav.info/. Enter Tosha Bowles in the search box to learn more about \"Tooth Extraction: Care Instructions. \"  Current as of: May 12, 2017  Content Version: 11.7  © 6955-9291 [employer]. Care instructions adapted under license by [employer] (which disclaims liability or warranty for this information). If you have questions about a medical condition or this instruction, always ask your healthcare professional. [address] any warranty or liability for your use of this information.

## 2018-09-27 NOTE — ED NOTES
received a referral from the .  [facility]  went to visit client while in [place]. Patient is uninsured with COPD, Allergies, and Recurring dental pain but receives care at [facility]. Patient received and completed an application for [facility] to assist with billing. A W-6 form from the  unemployment office is the only document that client needs to return. Patientt given an a information printout about [address] Oct 11,2018 at 01:45pm.  There will be a $55.00 copay  Due at the initial visit. Patient verbalized understanding of the information received. Patient states he has transportation. Patient states he will return by Friday September 28,2018 to return documentation. patient also received an INDIGENT VOUCHER for 2 prescriptions,.  explained to client that medication assistance can only be requested once per year. I also gave this patient 2 GoodRx discount coupons for the pain medications. Patient verbalized understanding,   walked patient to [address] [place] [address] to fill prescription. Patient was given [facility]] contact information for any further assistance that may be needed.

## 2018-10-02 ENCOUNTER — OFFICE VISIT (OUTPATIENT)
Dept: FAMILY MEDICINE CLINIC | Age: 38
End: 2018-10-02

## 2018-10-02 VITALS
HEIGHT: 71 IN | HEART RATE: 75 BPM | OXYGEN SATURATION: 97 % | TEMPERATURE: 98.6 F | RESPIRATION RATE: 16 BRPM | BODY MASS INDEX: 20.52 KG/M2 | SYSTOLIC BLOOD PRESSURE: 122 MMHG | WEIGHT: 146.6 LBS | DIASTOLIC BLOOD PRESSURE: 81 MMHG

## 2018-10-02 DIAGNOSIS — J44.9 CHRONIC OBSTRUCTIVE PULMONARY DISEASE, UNSPECIFIED COPD TYPE (HCC): Primary | ICD-10-CM

## 2018-10-02 DIAGNOSIS — Z28.21 REFUSED INFLUENZA VACCINE: ICD-10-CM

## 2018-10-02 DIAGNOSIS — Z71.89 COUNSELING ON HEALTH PROMOTION AND DISEASE PREVENTION: ICD-10-CM

## 2018-10-02 DIAGNOSIS — F17.200 CURRENT SMOKER: Chronic | ICD-10-CM

## 2018-10-02 RX ORDER — ALBUTEROL SULFATE 90 UG/1
2 AEROSOL, METERED RESPIRATORY (INHALATION)
Qty: 3 INHALER | Refills: 3 | Status: SHIPPED | COMMUNITY
Start: 2018-10-02 | End: 2019-05-28 | Stop reason: SDUPTHER

## 2018-10-02 RX ORDER — FLUTICASONE PROPIONATE AND SALMETEROL 250; 50 UG/1; UG/1
1 POWDER RESPIRATORY (INHALATION) EVERY 12 HOURS
Qty: 3 INHALER | Refills: 3 | Status: SHIPPED | COMMUNITY
Start: 2018-10-02 | End: 2019-06-04 | Stop reason: SDUPTHER

## 2018-10-02 RX ORDER — IPRATROPIUM BROMIDE AND ALBUTEROL SULFATE 2.5; .5 MG/3ML; MG/3ML
3 SOLUTION RESPIRATORY (INHALATION)
Qty: 30 NEBULE | Refills: 1 | Status: SHIPPED | OUTPATIENT
Start: 2018-10-02 | End: 2019-02-05

## 2018-10-02 NOTE — PROGRESS NOTES
[address]  [address] [address] [facility] [place] [address] [facility], [address]  [phone] [facility]/[phone] fax      10/2/2018    Reason for visit:   Chief Complaint   Patient presents with    Follow Up Chronic Condition       Patient: Angelia Ayala, 1980, [id number]       Primary MD: Erwin Bentley NP    Subjective:   Angelia Ayala, a 40 y.o. male, who presents for Follow Up Chronic Condition      Past Medical History:   Diagnosis Date    Asthma 1986    Chronic back pain     Chronic neck pain     Chronic obstructive pulmonary disease ([facility] 6/26/2018    Cluster headache     COPD (chronic obstructive pulmonary disease) ([facility] 01/09/2017    Mild Obstructive airway disease. See PFT results    Current smoker     Dental caries     Hypertriglyceridemia     Noncompliance with medication regimen 12/19/2016    Overuse of medication 12/19/2016    Scoliosis     Scoliosis of thoracic spine 12/19/2016    Shortness of breath 12/19/2016       No past surgical history on file. Social History     Social History    Marital status: SINGLE     Spouse name: N/A    Number of children: 0    Years of education: N/A     Occupational History    Not on file.      Social History Main Topics    Smoking status: Current Every Day Smoker     Packs/day: 0.80     Types: Cigarettes    Smokeless tobacco: Never Used    Alcohol use Yes    Drug use: No    Sexual activity: Not on file     Other Topics Concern     Service No    Blood Transfusions No    Caffeine Concern No    Occupational Exposure No    Hobby Hazards No    Sleep Concern Yes    Stress Concern No    Weight Concern No    Special Diet No    Back Care Yes     scoliosis    Exercise Yes    Bike Helmet No    Seat Belt Yes    Self-Exams No     Social History Narrative       No Known Allergies    Current Outpatient Prescriptions on File Prior to Visit   Medication Sig Dispense Refill    penicillin v potassium (VEETID) 500 mg tablet Take 1 Tab by mouth four (4) times daily for 10 days. 40 Tab 0    HYDROcodone-acetaminophen (NORCO) 5-325 mg per tablet Take 1 Tab by mouth every four (4) hours as needed for Pain. Max Daily Amount: 6 Tabs. 10 Tab 0    ibuprofen (MOTRIN) 600 mg tablet Take 1 Tab by mouth every six (6) hours as needed for Pain. 20 Tab 0    chlorhexidine (PERIDEX) 0.12 % solution 15 mL by Swish and Spit route two (2) times a day for 14 days. 420 mL 0    omega-3 acid ethyl esters (LOVAZA) 1 gram capsule Take 1 Cap by mouth daily (with breakfast). For triglycerides 90 Cap 3    Cholecalciferol, Vitamin D3, (VITAMIN D3) 1,000 unit cap Take  by mouth. No current facility-administered medications on file prior to visit. Review of Systems   Constitutional: Negative. HENT:        I went to the ED for tooth pain and infection. I am still taking the ABX. I picked up the percocet today. I take the motrin OTC as needed. Eyes: Negative. Respiratory:        I am not ready to quit smoking. My little brother moved in with me and is now stressing me out. When I get SOB I will hit my inhaler. I didn't realize [facility]. I am not ready for the chantix. I know 3 people who  from this medication. The med is sitting in my dresser. Cardiovascular: Negative for chest pain, palpitations and leg swelling. Gastrointestinal: Negative. Endocrine: Negative. Genitourinary: Negative. Musculoskeletal: Negative. Skin:        I cant get the plantar wart taken care of on my left foot because they (podiatrist) wants $100 to see MD in office and like $500 before surgery. I dont have that kind of money   Allergic/Immunologic: Negative. Neurological: Negative. Hematological: Negative. Psychiatric/Behavioral: Negative.         Objective:   Visit Vitals    /81    Pulse 75    Temp 98.6 °F (37 °C)    Resp 16    Ht 5' 11\" (1.803 m)    Wt 146 lb 9.6 oz (66.5 kg)    SpO2 97%    BMI 20.45 kg/m2      Wt Readings from Last 3 Encounters:   10/02/18 146 lb 9.6 oz (66.5 kg)   09/26/18 155 lb (70.3 kg)   06/26/18 143 lb (64.9 kg)     Lab Results   Component Value Date/Time    Glucose 95 03/01/2018 09:19 AM         Physical Exam   Constitutional: He is oriented to person, place, and time. Slim build   HENT:   Head: Atraumatic. Neck: Neck supple. Cardiovascular: Normal rate. Pulmonary/Chest: Effort normal and breath sounds normal. He has no wheezes. He has no rales. Musculoskeletal: Normal range of motion. Neurological: He is alert and oriented to person, place, and time. Skin: Skin is warm. Psychiatric: He has a normal mood and affect. Assessment:    Richard Lazaro who has risk factors including (see above previous medical hx) and:       ICD-10-CM ICD-9-CM    1. Chronic obstructive pulmonary disease, unspecified COPD type ([facility]) J44.9 496 fluticasone-salmeterol (ADVAIR) 250-50 mcg/dose diskus inhaler      albuterol-ipratropium (DUO-NEB) 2.5 mg-0.5 mg/3 ml nebu      albuterol (PROVENTIL HFA, VENTOLIN HFA, PROAIR HFA) 90 mcg/actuation inhaler   2. Current smoker F17.200 305.1    3. Refused influenza vaccine Z28.21 V64.06    4. Counseling on health promotion and disease prevention Z71.89 V65.49        1. Chronic obstructive pulmonary disease, unspecified COPD type ([facility]  Despite much education given to patient, pt continues to smoke cigs. Instructed pt ie COPD and management along with exacerbation care. Pt verb understanding    - fluticasone-salmeterol (ADVAIR) 250-50 mcg/dose diskus inhaler; Take 1 Puff by inhalation every twelve (12) hours. For COPD  Dispense: 3 Inhaler; Refill: 3  - albuterol-ipratropium (DUO-NEB) 2.5 mg-0.5 mg/3 ml nebu; 3 mL by Nebulization route every six (6) hours as needed. For COPD exacerbation  Dispense: 27 Nebule; Refill: 1  - albuterol (PROVENTIL HFA, VENTOLIN HFA, PROAIR HFA) 90 mcg/actuation inhaler;  Take 2 Puffs by inhalation every six (6) hours as needed for Wheezing or Shortness of Breath. Dispense: 3 Inhaler; Refill: 3    2. Current smoker  Counseled patient on the dangers of tobacco use, and was advised to quit. Reviewed strategies to maximize success, including the use of Chantix. Discussed the risks of continued tobacco use such as elevated blood pressure, vascular irritation with increased incidence of CVD with stroke or MI and PVD causing claudication, lung damage that could lead to COPD, cancer and death. Encouraged an approach to find a few healthy habits, write them down then plan to decrease their cigarette use by one each week till they are gone all together and to plan for stress that may cause them to want to restart and how to prevent it by having new coping mechanisms in place. 1-800-QUIT-NOW provided for counseling     Instructed pt to discard the Chantix if he does not plan on taking the medication. 3. Refused influenza vaccine  Educated pt on the importance of obtaining the Flu vaccine. The flu vaccine is available today at no cost to the patient. Instructed patient that a dead virus is given to help boost the immune system. Informed the patient that it can take up to 6 weeks for the immune system to develop against the flu and this vaccine is not 100% preventative. Instructed the patient on the possible consequences of not obtaining the flu vaccine. 4. Counseling on health promotion and disease prevention      Written instructions followed our verbal discussion of all information discussed above, pending tests ordered and future goals/plans. Patient expressed understanding of current diagnosis, planned testing, follow up and if needed to contact the office for any questions or concerns prior to the next visit. Plan:   Med reconciliation completed with patient. Reviewed side effects of medications with the patient. Questions were answered and patient verb understanding.       Discussed lab results with patient  None     Orders Placed This Encounter    fluticasone-salmeterol (ADVAIR) 250-50 mcg/dose diskus inhaler     Sig: Take 1 Puff by inhalation every twelve (12) hours. For COPD     Dispense:  3 Inhaler     Refill:  3     Order Specific Question:   Expiration Date     Answer:   1/31/2018     Order Specific Question:   Lot#     Answer:   [id number]     Order Specific Question:        Answer:   [facility]     Order Specific Question:   NDC#     Answer:   [id number]    albuterol-ipratropium (DUO-NEB) 2.5 mg-0.5 mg/3 ml nebu     Sig: 3 mL by Nebulization route every six (6) hours as needed. For COPD exacerbation     Dispense:  30 Nebule     Refill:  1    albuterol (PROVENTIL HFA, VENTOLIN HFA, PROAIR HFA) 90 mcg/actuation inhaler     Sig: Take 2 Puffs by inhalation every six (6) hours as needed for Wheezing or Shortness of Breath. Dispense:  3 Inhaler     Refill:  3     Current Outpatient Prescriptions   Medication Sig Dispense Refill    fluticasone-salmeterol (ADVAIR) 250-50 mcg/dose diskus inhaler Take 1 Puff by inhalation every twelve (12) hours. For COPD 3 Inhaler 3    albuterol-ipratropium (DUO-NEB) 2.5 mg-0.5 mg/3 ml nebu 3 mL by Nebulization route every six (6) hours as needed. For COPD exacerbation 30 Nebule 1    albuterol (PROVENTIL HFA, VENTOLIN HFA, PROAIR HFA) 90 mcg/actuation inhaler Take 2 Puffs by inhalation every six (6) hours as needed for Wheezing or Shortness of Breath. 3 Inhaler 3    penicillin v potassium (VEETID) 500 mg tablet Take 1 Tab by mouth four (4) times daily for 10 days. 40 Tab 0    HYDROcodone-acetaminophen (NORCO) 5-325 mg per tablet Take 1 Tab by mouth every four (4) hours as needed for Pain. Max Daily Amount: 6 Tabs. 10 Tab 0    ibuprofen (MOTRIN) 600 mg tablet Take 1 Tab by mouth every six (6) hours as needed for Pain. 20 Tab 0    chlorhexidine (PERIDEX) 0.12 % solution 15 mL by Swish and Spit route two (2) times a day for 14 days.  420 mL 0    omega-3 acid ethyl esters (LOVAZA) 1 gram capsule Take 1 Cap by mouth daily (with breakfast). For triglycerides 90 Cap 3    Cholecalciferol, Vitamin D3, (VITAMIN D3) 1,000 unit cap Take  by mouth. Follow-up Disposition:  Return in about 3 months (around 1/2/2019). No labs needed for 3 month follow-up appt      Tia Martini. Leandro RN, MSN, [employer]   [address] [place] [address]      I spent 25 minutes with the patient in face-to-face consultation, of which greater than 50% was spent in counseling and coordination of care as described above.

## 2018-10-02 NOTE — PATIENT INSTRUCTIONS
[facility] reminders! Foundation Operating Hours: These may change without notice. Mon- Wed 7am to 5pm. Closed for lunch 12-1pm  Thurs 7am to 12pm  Fridays closed     Office number:     **In case of inclement weather (snow and/or ice) please do not try to come into the office for your appointment. Please call in and you will not be counted as a \"No Show. \" SAFETY FIRST!!**    NO SHOW POLICY ~ If a patient has 3 no shows for an appointment with their Provider, Mental Health Provider, or the Nurse Navigator, they will be discharged from the practice for 6 months. Medication ordering will also be suspended. If the patient is discharged from the [facility], they can go to the [place] [address] or [address] [place] [address] where they can be seen for their primary care needs plus obtain the same type of medications as they received at the [facility]. To avoid being discharged the patient must call the office at [phone] 24 hours prior to their appointment if they need to cancel or reschedule, arrive to their appointments on time (preferrably 15 minutes early) (If you are late you will not be seen) and come to all scheduled appointments with the provider, mental health, and/or nurse navigator. If the patient is discharged from the practice, they can apply to be re-established after 6 months. Lab work:    Unless you are instructed differently, please return to the office between the hours of 7 am and 11:00 am Monday through Thursday to have your labs drawn one to two weeks before your next scheduled PROVIDER appointment. If you do not have an appointment to follow up on these results, please make one or plan to call the office if you do not hear from us to get the results. No news does not mean good news. Medications:   Medication  times are firm:  Mondays and Tuesdays from 1pm-5pm  Wednesdays and Thursdays from 8am-11:30am  These hours are subject to change without notice.     The [facility] or [facility] will assist you with your medications when available as not all medications can be obtained through this program. Medications are added and deleted from the [address] as deemed necessary by the pharmaceutical companies. There is a chance that a medication you currently receive from [facility] may be discontinued. If this occurs an alternative medication will be sent to a local pharmacy for you to obtain and pay for. If your medications are new or have changed, and you get your medications from the [facility]. [place] [address] [place] [address]), you MUST talk to the pharmacy staff to sign the new prescription applications. If you don't sign the applications we cannot get the medications for you. It usually takes 6-8 weeks for your medications to arrive. The Pharmacy staff will call you when your medications are available. If you don't hear from them you call [phone] and inquire about your medicines. You are responsible for obtaining your medications. You will have 30 days to come in and  your medications. If you don't  your medicines within those 30 days, those medicines may be placed on the self as samples and you will have to start all over again by completing the applications and waiting the 6-8 weeks for your medicines to arrive. Foot Care: Local ministry through [facility] ([address] [facility])  Every second Tuesday of the month (except for holidays and election days) from 9am to 1 pm. The services provided by these ministry volunteers are free of charge with the option to donate. They will inspect your feet thoroughly, soak them for 10 minutes, cut and file your nails. They care for diabetics as well. Keep in mind this service is free and will be on a first come first serve basis. Bad teeth? Ask about the Dental Clinic to get you in front of a local dentist when a dentist is available. They only extract teeth. No fillings, root canals, or dentures.  The bus leaves the second Thursday of the month for those on the list. (Ask about availability as these appointments are limited). If you are scheduled for the dentist and you fail to come into the Foundation to meet the bus, you WILL NOT be placed on the dental list again. IMPORTANT: if you have high blood pressure please take your blood pressure  medications before arriving to the Foundation. If your blood pressure is elevated  the dental clinic WILL NOT extract any teeth and you will not have another  opportunity to go to the clinic. DIABETES:   Do you have uncontrolled diabetes or you just want to learn more about your diabetes? Schedule with the Nurse navigator for our new 5-week Diabetes program. You will learn how to properly manage your diabetes: nutrition, exercise, medication therapy. Eye exams for Diabetics. Please let us know so we can add you to the list to see an eye doctor. These  appointments are limited. You will receive a free eye exam and free glasses if  needed. Unfortunately, if you are not a diabetic, we do not have a free service  for eye exams for you (yet!). We do have information on where to  go to get a  huge discount on eye exams and glasses. Sick visits: If you are sick and it is not an emergency call the office to schedule an appointment to see the provider. Care in the office is FREE but charges will be applied if you go to the Emergency room. If you feel better and do not wish to attend your sick appointment please call  the office and cancel to avoid a no-show. Charges and cost items from the Foundation:    Most of our orders are covered by [facility] [employer] but there ARE SOME CHARGES for items such as radiology interpretations and anesthesiology during procedures and surgeries that are not covered under [employer].       MedAssist   Please make sure you have contacted [employer] to check on your payment options:   1 [phone] Mon - Fri 8-4pm. It is important that you are screened in order to qualify for assistance and to avoid huge medical charges. This service is to benefit you. The [facility] is not responsible for ANY charges you may accrue regardless of who ordered the medication, procedure, treatment or test. If you go to the Emergency Room, you WILL be charged! Behavior and emotional issues! It is stressful to be sick, have an illness, take medications, not have a job, not have medical insurance, have family issues or just getting older! Schedule an appointment with our mental health provider. She is in the office Mondays and Wednesdays from 8am to [address] [facility] can also contact the following: The national suicide hotline (4-987-360-CLBK or 9-508.994.2180)    [address], [address] [place] [address] [address] [place] [address]   [phone]    Drug and Alcohol Addiction Issues! It is hard to stop a poor habit but there is help out there. Please feel free to attend any of the following support groups to help you kick the habit or go to [facility] to be evaluated by the psychiatric team. Never give up!! AlAnon meetings: [facility] MONDAY 10:30 AM [place] [address] SATURDAY 8:00 PM [facility] SATURDAY NIGHT [address]         ANIBAL BITS:  Disposing medicines in household trash: Almost all medicines can be thrown into your household trash. These include prescription and over-the-counter (OTC) drugs in pills, liquids, drops, patches, creams, and inhalers. Follow these steps:  1) Remove the drugs from their original containers and mix them with something undesirable, such as used coffee grounds, dirt, or cat litter.  This makes the medicine less appealing to children and pets and unrecognizable to someone who might intentionally go through the trash looking for drugs. 2) Put the mixture in something you can close (a re-sealable zipper storage bag, empty can, or other container) to prevent the drug from leaking or spilling out. 3) Throw the container in the garbage. 4) Scratch out all your personal information on the empty medicine packaging to protect your identity and privacy. Throw the packaging away. If you have a question about your medicine, ask your health care provider or pharmacist.       Stopping Smoking: Care Instructions  Your Care Instructions  Cigarette smokers crave the nicotine in cigarettes. Giving it up is much harder than simply changing a habit. Your body has to stop craving the nicotine. It is hard to quit, but you can do it. There are many tools that people use to quit smoking. You may find that combining tools works best for you. There are several steps to quitting. First you get ready to quit. Then you get support to help you. After that, you learn new skills and behaviors to become a nonsmoker. For many people, a necessary step is getting and using medicine. Your doctor will help you set up the plan that best meets your needs. You may want to attend a smoking cessation program to help you quit smoking. When you choose a program, look for one that has proven success. Ask your doctor for ideas. You will greatly increase your chances of success if you take medicine as well as get counseling or join a cessation program.  Some of the changes you feel when you first quit tobacco are uncomfortable. Your body will miss the nicotine at first, and you may feel short-tempered and grumpy. You may have trouble sleeping or concentrating. Medicine can help you deal with these symptoms. You may struggle with changing your smoking habits and rituals. The last step is the tricky one: Be prepared for the smoking urge to continue for a time. This is a lot to deal with, but keep at it.  You will feel better. Follow-up care is a key part of your treatment and safety. Be sure to make and go to all appointments, and call your doctor if you are having problems. It's also a good idea to know your test results and keep a list of the medicines you take. How can you care for yourself at home? · Ask your family, friends, and coworkers for support. You have a better chance of quitting if you have help and support. · Join a support group, such as Nicotine Anonymous, for people who are trying to quit smoking. · Consider signing up for a smoking cessation program, such as the American Lung Association's Freedom from Smoking program.  · Get text messaging support. Go to the website at www.smokefree. gov to sign up for the [facility] program.  · Set a quit date. Pick your date carefully so that it is not right in the middle of a big deadline or stressful time. Once you quit, do not even take a puff. Get rid of all ashtrays and lighters after your last cigarette. Clean your house and your clothes so that they do not smell of smoke. · Learn how to be a nonsmoker. Think about ways you can avoid those things that make you reach for a cigarette. ¨ Avoid situations that put you at greatest risk for smoking. For some people, it is hard to have a drink with friends without smoking. For others, they might skip a coffee break with coworkers who smoke. ¨ Change your daily routine. Take a different route to work or eat a meal in a different place. · Cut down on stress. Calm yourself or release tension by doing an activity you enjoy, such as reading a book, taking a hot bath, or gardening. · Talk to your doctor or pharmacist about nicotine replacement therapy, which replaces the nicotine in your body. You still get nicotine but you do not use tobacco. Nicotine replacement products help you slowly reduce the amount of nicotine you need.  These products come in several forms, many of them available over-the-counter:  ¨ Nicotine patches  ¨ Nicotine gum and lozenges  ¨ Nicotine inhaler  · Ask your doctor about bupropion (Wellbutrin) or varenicline (Chantix), which are prescription medicines. They do not contain nicotine. They help you by reducing withdrawal symptoms, such as stress and anxiety. · Some people find hypnosis, acupuncture, and massage helpful for ending the smoking habit. · Eat a healthy diet and get regular exercise. Having healthy habits will help your body move past its craving for nicotine. · Be prepared to keep trying. Most people are not successful the first few times they try to quit. Do not get mad at yourself if you smoke again. Make a list of things you learned and think about when you want to try again, such as next week, next month, or next year. Where can you learn more? Go to http://neptali-abhinav.info/. Enter S589 in the search box to learn more about \"Stopping Smoking: Care Instructions. \"  Current as of: November 29, 2017  Content Version: 11.7  © 4910-1763 [employer]. Care instructions adapted under license by [employer] (which disclaims liability or warranty for this information). If you have questions about a medical condition or this instruction, always ask your healthcare professional. [address] any warranty or liability for your use of this information.

## 2018-10-02 NOTE — MR AVS SNAPSHOT
[address] [facility] [address] 
[phone] Patient: Jennifer Hare MRN: [id number] HED:[id number] Visit Information Date & Time Provider Department Dept. Phone Encounter #  
 10/2/2018  3:45 PM Liam Moody NP 1997 [facility] [id number] Follow-up Instructions Return in about 3 months (around 1/2/2019). Your Appointments 2/5/2019  3:15 PM  
Follow Up with Liam Moody NP 269[address] [facility] ([address]) Appt Note: 4 mth follow up/no labs needed 7[address] [facility] [address]  
1225 [facility] [address] Upcoming Health Maintenance Date Due Influenza Age 5 to Adult 11/25/2019* DTaP/Tdap/Td series (2 - Td) 8/2/2027 *Topic was postponed. The date shown is not the original due date. Allergies as of 10/2/2018  Review Complete On: 10/2/2018 By: Hilary Pierre No Known Allergies Current Immunizations  Never Reviewed No immunizations on file. Not reviewed this visit You Were Diagnosed With   
  
 Codes Comments Refused influenza vaccine    -  Primary ICD-10-CM: Z28.21 ICD-9-CM: V64.06   
 Current smoker     ICD-10-CM: F17.200 ICD-9-CM: 305.1 Shortness of breath     ICD-10-CM: R06.02 
ICD-9-CM: 786.05 Chronic obstructive pulmonary disease, unspecified COPD type ([facility] 75.)     ICD-10-CM: J44.9 ICD-9-CM: 495 Vitals BP Pulse Temp Resp Height(growth percentile) Weight(growth percentile) 122/81 75 98.6 °F (37 °C) 16 5' 11\" (1.803 m) 146 lb 9.6 oz (66.5 kg) SpO2 BMI Smoking Status 97% 20.45 kg/m2 Current Every Day Smoker Vitals History BMI and BSA Data Body Mass Index Body Surface Area  
 20.45 kg/m 2 1.83 m 2 Preferred Pharmacy Pharmacy Name Phone [facility] [address]. [phone] Your Updated Medication List  
  
   
This list is accurate as of 10/2/18  3:58 PM.  Always use your most recent med list.  
  
  
  
  
 albuterol 90 mcg/actuation inhaler Commonly known as:  PROVENTIL HFA, VENTOLIN HFA, PROAIR HFA Take 2 Puffs by inhalation every six (6) hours as needed for Wheezing or Shortness of Breath. albuterol-ipratropium 2.5 mg-0.5 mg/3 ml Nebu Commonly known as:  DUO-NEB  
3 mL by Nebulization route every six (6) hours as needed. For COPD exacerbation  
  
 chlorhexidine 0.12 % solution Commonly known as:  PERIDEX 15 mL by Swish and Spit route two (2) times a day for 14 days. fluticasone-salmeterol 250-50 mcg/dose diskus inhaler Commonly known as:  ADVAIR Take 1 Puff by inhalation every twelve (12) hours. For COPD HYDROcodone-acetaminophen 5-325 mg per tablet Commonly known as:  [address] [place] Take 1 Tab by mouth every four (4) hours as needed for Pain. Max Daily Amount: 6 Tabs. ibuprofen 600 mg tablet Commonly known as:  MOTRIN Take 1 Tab by mouth every six (6) hours as needed for Pain. omega-3 acid ethyl esters 1 gram capsule Commonly known as:  Saundra Dalia Take 1 Cap by mouth daily (with breakfast). For triglycerides  
  
 penicillin v potassium 500 mg tablet Commonly known as:  VEETID Take 1 Tab by mouth four (4) times daily for 10 days. VITAMIN D3 1,000 unit Cap Generic drug:  cholecalciferol Take  by mouth. Prescriptions Printed Refills  
 fluticasone-salmeterol (ADVAIR) 250-50 mcg/dose diskus inhaler 3 Sig: Take 1 Puff by inhalation every twelve (12) hours. For COPD Class: Program  
 Route: Inhalation  
 albuterol (PROVENTIL HFA, VENTOLIN HFA, PROAIR HFA) 90 mcg/actuation inhaler 3 Sig: Take 2 Puffs by inhalation every six (6) hours as needed for Wheezing or Shortness of Breath.   
 Class: Program  
 Route: Inhalation Prescriptions Sent to Mail Order Refills  
 fluticasone-salmeterol (ADVAIR) 250-50 mcg/dose diskus inhaler 3 Sig: Take 1 Puff by inhalation every twelve (12) hours. For COPD Class: Program  
 Pharmacy: [facility] [address], [address] [place] [address]. Ph #: [phone] Route: Inhalation  
 albuterol (PROVENTIL HFA, VENTOLIN HFA, PROAIR HFA) 90 mcg/actuation inhaler 3 Sig: Take 2 Puffs by inhalation every six (6) hours as needed for Wheezing or Shortness of Breath. Class: Program  
 Pharmacy: [address] [place] [address], [address] [place] [address]. Ph #: [phone] Route: Inhalation Prescriptions Sent to Pharmacy Refills  
 fluticasone-salmeterol (ADVAIR) 250-50 mcg/dose diskus inhaler 3 Sig: Take 1 Puff by inhalation every twelve (12) hours. For COPD Class: Program  
 Pharmacy: [address] [place] [address], [address] [place] [address]. Ph #: [phone] Route: Inhalation  
 albuterol-ipratropium (DUO-NEB) 2.5 mg-0.5 mg/3 ml nebu 1 Sig: 3 mL by Nebulization route every six (6) hours as needed. For COPD exacerbation Class: Normal  
 Pharmacy: [address] [place], [place] [address]. Ph #: [phone] Route: Nebulization  
 albuterol (PROVENTIL HFA, VENTOLIN HFA, PROAIR HFA) 90 mcg/actuation inhaler 3 Sig: Take 2 Puffs by inhalation every six (6) hours as needed for Wheezing or Shortness of Breath. Class: Program  
 Pharmacy: [address] [place] [address], [address] [place] [address]. Ph #: [phone] Route: Inhalation Follow-up Instructions Return in about 3 months (around 1/2/2019). Patient Instructions [facility] reminders! Foundation Operating Hours: These may change without notice. Mon- Wed 7am to 5pm. Closed for lunch 12-1pm 
Thurs 7am to 12pm 
Fridays closed Office number:  **In case of inclement weather (snow and/or ice) please do not try to come into the office for your appointment. Please call in and you will not be counted as a \"No Show. \" SAFETY FIRST!!** 
 
NO SHOW POLICY ~ If a patient has 3 no shows for an appointment with their Provider, Mental Health Provider, or the Nurse Navigator, they will be discharged from the practice for 6 months. Medication ordering will also be suspended. If the patient is discharged from the [facility], they can go to the [facility] 15 or 0 [facility] on [facility] where they can be seen for their primary care needs plus obtain the same type of medications as they received at the [facility]. To avoid being discharged the patient must call the office at [phone] 24 hours prior to their appointment if they need to cancel or reschedule, arrive to their appointments on time (preferrably 15 minutes early) (If you are late you will not be seen) and come to all scheduled appointments with the provider, mental health, and/or nurse navigator. If the patient is discharged from the practice, they can apply to be re-established after 6 months. Lab work:   
Unless you are instructed differently, please return to the office between the hours of 7 am and 11:00 am Monday through Thursday to have your labs drawn one to two weeks before your next scheduled PROVIDER appointment. If you do not have an appointment to follow up on these results, please make one or plan to call the office if you do not hear from us to get the results. No news does not mean good news. Medications:  
Medication  times are firm: 
Mondays and Tuesdays from 1pm-5pm 
Wednesdays and Thursdays from 8am-11:30am 
These hours are subject to change without notice.  
 
The [facility] or [facility] will assist you with your medications when available as not all medications can be obtained through this program. Medications are added and deleted from the [address] as deemed necessary by the pharmaceutical companies. There is a chance that a medication you currently receive from [facility] may be discontinued. If this occurs an alternative medication will be sent to a local pharmacy for you to obtain and pay for. If your medications are new or have changed, and you get your medications from the [facility] [address] [place] [address]), you MUST talk to the pharmacy staff to sign the new prescription applications. If you don't sign the applications we cannot get the medications for you. It usually takes 6-8 weeks for your medications to arrive. The Pharmacy staff will call you when your medications are available. If you don't hear from them you call [phone] and inquire about your medicines. You are responsible for obtaining your medications. You will have 30 days to come in and  your medications. If you don't  your medicines within those 30 days, those medicines may be placed on the self as samples and you will have to start all over again by completing the applications and waiting the 6-8 weeks for your medicines to arrive. Foot Care: Local [facility] through [facility] ([address]) Every second Tuesday of the month (except for holidays and election days) from 9am to 1 pm. The services provided by these ministry volunteers are free of charge with the option to donate. They will inspect your feet thoroughly, soak them for 10 minutes, cut and file your nails. They care for diabetics as well. Keep in mind this service is free and will be on a first come first serve basis. Bad teeth? Ask about the Dental Clinic to get you in front of a local dentist when a dentist is available. They only extract teeth. No fillings, root canals, or dentures. The bus leaves the second Thursday of the month for those on the list. (Ask about availability as these appointments are limited).  If you are scheduled for the dentist and you fail to come into the [facility] to meet the bus, you WILL NOT be placed on the dental list again. IMPORTANT: if you have high blood pressure please take your blood pressure  medications before arriving to the Foundation. If your blood pressure is elevated  the dental clinic WILL NOT extract any teeth and you will not have another  opportunity to go to the clinic. DIABETES:  
Do you have uncontrolled diabetes or you just want to learn more about your diabetes? Schedule with the Nurse navigator for our new 5-week Diabetes program. You will learn how to properly manage your diabetes: nutrition, exercise, medication therapy. Eye exams for Diabetics. Please let us know so we can add you to the list to see an eye doctor. These  appointments are limited. You will receive a free eye exam and free glasses if  needed. Unfortunately, if you are not a diabetic, we do not have a free service  for eye exams for you (yet!). We do have information on where to  go to get a  huge discount on eye exams and glasses. Sick visits: If you are sick and it is not an emergency call the office to schedule an appointment to see the provider. Care in the office is FREE but charges will be applied if you go to the Emergency room. If you feel better and do not wish to attend your sick appointment please call  the office and cancel to avoid a no-show. Charges and cost items from the Foundation: Most of our orders are covered by [address] [place] [address] [place] but there ARE SOME CHARGES for items such as radiology interpretations and anesthesiology during procedures and surgeries that are not covered under [facility]. [employer]ist  
Please make sure you have contacted nprogress to check on your payment options:  
1 [phone] Mon - Fri 8-4pm. It is important that you are screened in order to qualify for assistance and to avoid huge medical charges. This service is to benefit you.  The [facility] is not responsible for ANY charges you may accrue regardless of who ordered the medication, procedure, treatment or test. If you go to the Emergency Room, you WILL be charged! Behavior and emotional issues! It is stressful to be sick, have an illness, take medications, not have a job, not have medical insurance, have family issues or just getting older! Schedule an appointment with our mental health provider. She is in the office Mondays and Wednesdays from 8am to [place] [address] can also contact the following: The national suicide hotline (3-610-602-[facility] or 3-311.333.8747) [facility] [facility] 611 [facility], [address] [facility] 
[phone] Community Services [facility] ([facility]B) - [place] 14[address] [facility], [address] 
[phone] Drug and Alcohol Addiction Issues! It is hard to stop a poor habit but there is help out there. Please feel free to attend any of the following support groups to help you kick the habit or go to [facility] Emergency [facility] to be evaluated by the psychiatric team. Never give up!! AlAnon meetings: [facility] MONDAY 10:30 AM [address] [place] [address] 2[address] [facility] SATURDAY 8:00 PM [facility] SATURDAY NIGHT AFELSIE RIVERA [address] BITS: 
Disposing medicines in household trash: Almost all medicines can be thrown into your household trash. These include prescription and over-the-counter (OTC) drugs in pills, liquids, drops, patches, creams, and inhalers. Follow these steps: 
1) Remove the drugs from their original containers and mix them with something undesirable, such as used coffee grounds, dirt, or cat litter. This makes the medicine less appealing to children and pets and unrecognizable to someone who might intentionally go through the trash looking for drugs.  
2) Put the mixture in something you can close (a re-sealable zipper storage bag, empty can, or other container) to prevent the drug from leaking or spilling out. 3) Throw the container in the garbage. 4) Scratch out all your personal information on the empty medicine packaging to protect your identity and privacy. Throw the packaging away. If you have a question about your medicine, ask your health care provider or pharmacist. 
 
  
Stopping Smoking: Care Instructions Your Care Instructions Cigarette smokers crave the nicotine in cigarettes. Giving it up is much harder than simply changing a habit. Your body has to stop craving the nicotine. It is hard to quit, but you can do it. There are many tools that people use to quit smoking. You may find that combining tools works best for you. There are several steps to quitting. First you get ready to quit. Then you get support to help you. After that, you learn new skills and behaviors to become a nonsmoker. For many people, a necessary step is getting and using medicine. Your doctor will help you set up the plan that best meets your needs. You may want to attend a smoking cessation program to help you quit smoking. When you choose a program, look for one that has proven success. Ask your doctor for ideas. You will greatly increase your chances of success if you take medicine as well as get counseling or join a cessation program. 
Some of the changes you feel when you first quit tobacco are uncomfortable. Your body will miss the nicotine at first, and you may feel short-tempered and grumpy. You may have trouble sleeping or concentrating. Medicine can help you deal with these symptoms. You may struggle with changing your smoking habits and rituals. The last step is the tricky one: Be prepared for the smoking urge to continue for a time. This is a lot to deal with, but keep at it. You will feel better. Follow-up care is a key part of your treatment and safety.  Be sure to make and go to all appointments, and call your doctor if you are having problems. It's also a good idea to know your test results and keep a list of the medicines you take. How can you care for yourself at home? · Ask your family, friends, and coworkers for support. You have a better chance of quitting if you have help and support. · Join a support group, such as Nicotine Anonymous, for people who are trying to quit smoking. · Consider signing up for a smoking cessation program, such as the American Lung Association's Freedom from Smoking program. 
· Get text messaging support. Go to the website at www.smokefree. gov to sign up for the [facility] program. 
· Set a quit date. Pick your date carefully so that it is not right in the middle of a big deadline or stressful time. Once you quit, do not even take a puff. Get rid of all ashtrays and lighters after your last cigarette. Clean your house and your clothes so that they do not smell of smoke. · Learn how to be a nonsmoker. Think about ways you can avoid those things that make you reach for a cigarette. ¨ Avoid situations that put you at greatest risk for smoking. For some people, it is hard to have a drink with friends without smoking. For others, they might skip a coffee break with coworkers who smoke. ¨ Change your daily routine. Take a different route to work or eat a meal in a different place. · Cut down on stress. Calm yourself or release tension by doing an activity you enjoy, such as reading a book, taking a hot bath, or gardening. · Talk to your doctor or pharmacist about nicotine replacement therapy, which replaces the nicotine in your body. You still get nicotine but you do not use tobacco. Nicotine replacement products help you slowly reduce the amount of nicotine you need. These products come in several forms, many of them available over-the-counter: ¨ Nicotine patches ¨ Nicotine gum and lozenges ¨ Nicotine inhaler · Ask your doctor about bupropion (Wellbutrin) or varenicline (Chantix), which are prescription medicines. They do not contain nicotine. They help you by reducing withdrawal symptoms, such as stress and anxiety. · Some people find hypnosis, acupuncture, and massage helpful for ending the smoking habit. · Eat a healthy diet and get regular exercise. Having healthy habits will help your body move past its craving for nicotine. · Be prepared to keep trying. Most people are not successful the first few times they try to quit. Do not get mad at yourself if you smoke again. Make a list of things you learned and think about when you want to try again, such as next week, next month, or next year. Where can you learn more? Go to http://neptali-abhinav.info/. Enter A137 in the search box to learn more about \"Stopping Smoking: Care Instructions. \" Current as of: November 29, 2017 Content Version: 11.7 © 7386-6226 [employer]. Care instructions adapted under license by [employer] (which disclaims liability or warranty for this information). If you have questions about a medical condition or this instruction, always ask your healthcare professional. [address] any warranty or liability for your use of this information. Introducing [facility]! [facility] introduces [employer] patient portal. Now you can access parts of your medical record, email your doctor's office, and request medication refills online. 1. In your internet browser, go to [email or web address] 2. Click on the First Time User? Click Here link in the Sign In box. You will see the New Member Sign Up page. 3. Enter your [employer] Access Code exactly as it appears below. You will not need to use this code after youve completed the sign-up process. If you do not sign up before the expiration date, you must request a new code. · [employer] Access Code: [email or web address] Expires: 12/25/2018  1:19 PM 
 
4. Enter the last four digits of your Social Security Number (xxxx) and Date of Birth (mm/dd/yyyy) as indicated and click Submit. You will be taken to the next sign-up page. 5. Create a [employer] ID. This will be your [employer] login ID and cannot be changed, so think of one that is secure and easy to remember. 6. Create a [employer] password. You can change your password at any time. 7. Enter your Password Reset Question and Answer. This can be used at a later time if you forget your password. 8. Enter your e-mail address. You will receive e-mail notification when new information is available in [address] 9. Click Sign Up. You can now view and download portions of your medical record. 10. Click the Download Summary menu link to download a portable copy of your medical information. If you have questions, please visit the Frequently Asked Questions section of the [employer] website. Remember, [employer] is NOT to be used for urgent needs. For medical emergencies, dial 911. Now available from your iPhone and Android! Please provide this summary of care documentation to your next provider. Your primary care clinician is listed as Patrick Mcfadden. If you have any questions after today's visit, please call [phone].

## 2018-10-03 ENCOUNTER — TELEPHONE (OUTPATIENT)
Dept: FAMILY MEDICINE CLINIC | Age: 38
End: 2018-10-03

## 2018-10-03 NOTE — TELEPHONE ENCOUNTER
Medication: Advair 250/50, dose: 1 inhalation, how often: twice daily, current number of medication days provided: 90, refill per application. Lot #: [id number], EXP 10/2019. This medication was received and verified for the following 1. Correct Patient, 2. Correct Diagnosis, 3. Correct Drug, 4. Correct route, and no current allergy to medication. Please contact patient to come  their medications.      Dennis Oneil, MSN,RN,AGNP-C     [facility]

## 2018-10-29 ENCOUNTER — APPOINTMENT (OUTPATIENT)
Dept: GENERAL RADIOLOGY | Age: 38
End: 2018-10-29
Attending: PHYSICIAN ASSISTANT
Payer: SUBSIDIZED

## 2018-10-29 ENCOUNTER — HOSPITAL ENCOUNTER (EMERGENCY)
Age: 38
Discharge: HOME | End: 2018-10-29
Attending: EMERGENCY MEDICINE
Payer: SUBSIDIZED

## 2018-10-29 VITALS
HEART RATE: 71 BPM | RESPIRATION RATE: 16 BRPM | SYSTOLIC BLOOD PRESSURE: 124 MMHG | DIASTOLIC BLOOD PRESSURE: 86 MMHG | TEMPERATURE: 98.6 F | OXYGEN SATURATION: 100 %

## 2018-10-29 DIAGNOSIS — M25.842 CYST OF JOINT OF HAND, LEFT: ICD-10-CM
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
## 2018-10-29 DIAGNOSIS — S62.667A CLOSED NONDISPLACED FRACTURE OF DISTAL PHALANX OF LEFT LITTLE FINGER, INITIAL ENCOUNTER: Primary | ICD-10-CM

## 2018-10-29 DIAGNOSIS — S50.02XA CONTUSION OF LEFT ELBOW, INITIAL ENCOUNTER: ICD-10-CM

## 2018-10-29 PROCEDURE — 77030008323 HC SPLNT FNGR GTR DJOR -A

## 2018-10-29 PROCEDURE — 73080 X-RAY EXAM OF ELBOW: CPT

## 2018-10-29 PROCEDURE — 73130 X-RAY EXAM OF HAND: CPT

## 2018-10-29 PROCEDURE — 99283 EMERGENCY DEPT VISIT LOW MDM: CPT

## 2018-10-29 NOTE — DISCHARGE INSTRUCTIONS
Finger Fracture: Care Instructions  Your Care Instructions    Breaks in the bones of the finger usually heal well in about 3 to 4 weeks. The pain and swelling from a broken finger can last for weeks. But it should steadily improve, starting a few days after you break it. It is very important that you wear and take care of the cast or splint exactly as your doctor tells you to so that your finger heals properly and does not end up crooked. Wearing a splint may interfere with your normal activities. Ask for help with daily tasks if you need it. You heal best when you take good care of yourself. Eat a variety of healthy foods, and don't smoke. Follow-up care is a key part of your treatment and safety. Be sure to make and go to all appointments, and call your doctor if you are having problems. It's also a good idea to know your test results and keep a list of the medicines you take. How can you care for yourself at home? · If your doctor put a splint on your finger, wear the splint exactly as directed. Do not remove it until your doctor says that you can. · Keep your hand raised above the level of your heart as much as you can. This will help reduce swelling. · Put ice or a cold pack on your finger for 10 to 20 minutes at a time. Try to do this every 1 to 2 hours for the next 3 days (when you are awake) or until the swelling goes down. Put a thin cloth between the ice and your skin. Keep the splint dry. · Be safe with medicines. Take pain medicines exactly as directed. ? If the doctor gave you a prescription medicine for pain, take it as prescribed. ? If you are not taking a prescription pain medicine, ask your doctor if you can take an over-the-counter medicine. When should you call for help? Call 911 anytime you think you may need emergency care.  For example, call if:    · Your finger is cool or pale or changes color.    Call your doctor now or seek immediate medical care if:    · Your pain gets much worse.     · You have tingling, weakness, or numbness in your finger.     · You have signs of infection, such as:  ? Increased pain, swelling, warmth, or redness. ? Red streaks leading from the area. ? Pus draining from the area. ? Swollen lymph nodes in your neck, armpits, or groin. ? A fever.    Watch closely for changes in your health, and be sure to contact your doctor if:    · Your finger is not steadily improving. Where can you learn more? Go to http://neptali-abhinav.info/. Enter W018 in the search box to learn more about \"Finger Fracture: Care Instructions. \"  Current as of: November 29, 2017  Content Version: 11.8  © 1589-1506 Healthwise, [employer]. Care instructions adapted under license by [employer] (which disclaims liability or warranty for this information). If you have questions about a medical condition or this instruction, always ask your healthcare professional. [place] [address] any warranty or liability for your use of this information.

## 2018-10-29 NOTE — ED TRIAGE NOTES
Patient arrived from home c.o hurt pinky finger at home  While drinking alcohol. Patient denies allergies

## 2018-10-29 NOTE — ED PROVIDER NOTES
EMERGENCY DEPARTMENT HISTORY AND PHYSICAL EXAM 
 
2:03 PM 
 
 
Date: 10/29/2018 Patient Name: Laura Rollins History of Presenting Illness Chief Complaint Patient presents with  Finger Pain History Provided By: Patient Chief Complaint: Finger pain Duration:  2 Days Timing:  Constant Location: Left 5th finger Quality: Aching Severity: Moderate Modifying Factors: Pain is exacerbated by movement Associated Symptoms: Left elbow pain. Patient denies numbness, weakness, and any other associated symptoms or complaints. Additional History (Context): Laura Rollins is a 40 y.o. male with a past medical history of hypertriglyceridemia who presents with c/o left 5th finger due to a fall 2 days ago. Patient describes the pain as constant, aching, moderate, and is exacerbated by movement. Associated symptoms include left elbow pain and some minor scrapes on right hand. He states that he fell onto his left arm while intoxicated after a Halloween party. Patient denies numbness, weakness, and any other associated symptoms or complaints. PCP: Herve Melendez NP Current Outpatient Medications Medication Sig Dispense Refill  fluticasone-salmeterol (ADVAIR) 250-50 mcg/dose diskus inhaler Take 1 Puff by inhalation every twelve (12) hours. For COPD 3 Inhaler 3  
 albuterol-ipratropium (DUO-NEB) 2.5 mg-0.5 mg/3 ml nebu 3 mL by Nebulization route every six (6) hours as needed. For COPD exacerbation 30 Nebule 1  
 albuterol (PROVENTIL HFA, VENTOLIN HFA, PROAIR HFA) 90 mcg/actuation inhaler Take 2 Puffs by inhalation every six (6) hours as needed for Wheezing or Shortness of Breath. 3 Inhaler 3  
 HYDROcodone-acetaminophen (NORCO) 5-325 mg per tablet Take 1 Tab by mouth every four (4) hours as needed for Pain. Max Daily Amount: 6 Tabs. 10 Tab 0  ibuprofen (MOTRIN) 600 mg tablet Take 1 Tab by mouth every six (6) hours as needed for Pain.  20 Tab 0  
  omega-3 acid ethyl esters (LOVAZA) 1 gram capsule Take 1 Cap by mouth daily (with breakfast). For triglycerides 90 Cap 3  Cholecalciferol, Vitamin D3, (VITAMIN D3) 1,000 unit cap Take  by mouth. Past History Past Medical History: 
Past Medical History:  
Diagnosis Date [address] [place]  Chronic back pain  Chronic neck pain  Chronic obstructive pulmonary disease ([facility] 6/26/2018  Cluster headache  [facility] 01[facility]2017 Mild Obstructive airway disease. See PFT results  Current smoker  Dental caries  Hypertriglyceridemia  Noncompliance with medication regimen 12/19/2016  Overuse of medication 12/19/2016  Scoliosis  Scoliosis of thoracic spine 12/19/2016  Shortness of breath 12/19/2016 Past Surgical History: No past surgical history on file. Family History: 
Family History Problem Relation Age of Onset  Heart Attack Father  Hypertension Father  Migraines Father Social History: 
Social History Tobacco Use  Smoking status: Current Every Day Smoker Packs/day: 0.80 Types: Cigarettes  Smokeless tobacco: Never Used Substance Use Topics  Alcohol use: Yes  Drug use: No  
 
 
Allergies: 
No Known Allergies Review of Systems Review of Systems Constitutional: Negative for fever. HENT: Negative for facial swelling. Eyes: Negative for visual disturbance. Respiratory: Negative for shortness of breath. Cardiovascular: Negative for chest pain. Gastrointestinal: Negative for abdominal pain. Genitourinary: Negative for dysuria. Musculoskeletal: Positive for arthralgias (left 5th finger and left elbow). Negative for neck pain. Skin: Negative for rash. Neurological: Negative for dizziness, weakness and numbness. Psychiatric/Behavioral: Negative for confusion. All other systems reviewed and are negative. Physical Exam  
 
Visit Vitals /86 (BP 1 Location: Left arm, BP Patient Position: At rest) Pulse 71 Temp 98.6 °F (37 °C) Resp 16 SpO2 100% Physical Exam  
Constitutional: He is oriented to person, place, and time. He appears well-developed and well-nourished. No distress. HENT:  
Head: Normocephalic and atraumatic. Eyes: Conjunctivae and EOM are normal. Pupils are equal, round, and reactive to light. Neck: Normal range of motion. Neck supple. Cardiovascular: Normal rate, regular rhythm, normal heart sounds and intact distal pulses. Pulmonary/Chest: Effort normal and breath sounds normal. No respiratory distress. Abdominal: Soft. Bowel sounds are normal. He exhibits no distension. There is no tenderness. Musculoskeletal: Normal range of motion. He exhibits no edema. Left distal 5th finger with diffuse edema, erythema/ecchymosis and TTP; cap refill <2 sec, sensation intact throughout; limited ROM secondary to pain; minimal abrasion noted just over the DIP joint (dorsal surface). Neurological: He is alert and oriented to person, place, and time. Skin: Skin is warm and dry. No rash noted. Psychiatric: He has a normal mood and affect. Nursing note and vitals reviewed. Diagnostic Study Results Radiologic Studies -  
XR HAND LT MIN 3 V Final Result XR ELBOW LT MIN 3 V Final Result Xr Elbow Lt Min 3 V Result Date: 10/29/2018 EXAM: XR ELBOW LT MIN 3 V INDICATION: 40 years Male. fall. ADDITIONAL HISTORY: None, COMPARISON: No comparison study is available at the time of this dictation. TECHNIQUE: 3 views of the left elbow. FINDINGS: Bone mineralization is within normal limits. There is no acute fracture or dislocation. Joint spaces are preserved. No erosions or aggressive osseous lesions are seen. There is no evidence of elbow joint effusion. The anterior fat pad is mildly prominent but not definitely elevated. The posterior fat pad is not seen. IMPRESSION: No evidence of acute fracture or dislocation. Xr Hand Lt Min 3 V Result Date: 10/29/2018 EXAM: XR HAND LT MIN 3 V INDICATION: 40 years Male. fall. ADDITIONAL HISTORY: None, COMPARISON: 8/2/2017 TECHNIQUE: 3 views of the left hand. FINDINGS: There is an acute mildly displaced fracture of the base of the fifth proximal phalanx. Mild to moderate adjacent soft tissue swelling is noted. Joint spaces are relatively preserved. No erosions or aggressive osseous lesions are seen. There has been slight interval increase in size of a soft tissue mass at the radial aspect of the distal fourth proximal phalanx, with questionable internal mineralization. IMPRESSION: 1. Acute mildly displaced transversely oriented fracture of the fifth distal phalangeal base with mild to moderate associated soft tissue swelling. 2.  Slight interval increase in size of a soft tissue nodular mass at the radial aspect of the distal fourth proximal phalanx without associated osseous abnormality and questionable punctate internal mineralization. Suggest correlation with physical examination and consider further evaluation with ultrasound or MRI. Medical Decision Making I am the first provider for this patient. I reviewed the vital signs, available nursing notes, past medical history, past surgical history, family history and social history. Vital Signs-Reviewed the patient's vital signs. Pulse Oximetry Analysis -  100% on room air (normal) Records Reviewed: Nursing Notes (Time of Review: 2:03 PM) ED Course: Progress Notes, Reevaluation, and Consults: 
 
Provider Notes (Medical Decision Making): Pt fell 2 days ago, landed on his left 5th finger. Now with a fracture. Splint placed. Pt to f/u with ortho. Motrin, ice, rest, elevate. SPLINT NOTE: 4:11 PM 10/29/2018 Splint applied as ordered by: Jolene Smith 
TYPE of Splint: Finger splint Location: Left 5th finger Neurovascular intact prior to application of splint. Neurovascular intact after application of splint. Splint in acceptable position of comfort. WESLEY He Diagnosis Clinical Impression: 1. Closed nondisplaced fracture of distal phalanx of left little finger, initial encounter 2. Cyst of joint of hand, left 3. Contusion of left elbow, initial encounter 4. Fall, initial encounter Disposition: discharge Follow-up Information Follow up With Specialties Details Why Contact Info Roise Oppenheim, MD Orthopedic Surgery In 2 days [facility] EMERGENCY DEPT Emergency Medicine  If symptoms worsen [address] [id number] 
[phone] Medication List  
  
ASK your doctor about these medications   
albuterol 90 mcg/actuation inhaler Commonly known as:  PROVENTIL HFA, VENTOLIN HFA, PROAIR HFA Take 2 Puffs by inhalation every six (6) hours as needed for Wheezing or Shortness of Breath. albuterol-ipratropium 2.5 mg-0.5 mg/3 ml Nebu Commonly known as:  DUO-NEB 
3 mL by Nebulization route every six (6) hours as needed. For COPD exacerbation 
  
fluticasone-salmeterol 250-50 mcg/dose diskus inhaler Commonly known as:  ADVAIR Take 1 Puff by inhalation every twelve (12) hours. For COPD HYDROcodone-acetaminophen 5-325 mg per tablet Commonly known as:  Beavers Medley Take 1 Tab by mouth every four (4) hours as needed for Pain. Max Daily Amount: 6 Tabs. ibuprofen 600 mg tablet Commonly known as:  MOTRIN Take 1 Tab by mouth every six (6) hours as needed for Pain. omega-3 acid ethyl esters 1 gram capsule Commonly known as:  Willistine Gala Take 1 Cap by mouth daily (with breakfast). For triglycerides VITAMIN D3 1,000 unit Cap Generic drug:  cholecalciferol 
  
  
 
_______________________________ Attestations: 
Scribe Attestation Milady Sober acting as a scribe for and in the presence of Gucci Decker, [id number] October 29, 2018 at 2:03 PM 
    
 Provider Attestation:     
I personally performed the services described in the documentation, reviewed the documentation, as recorded by the scribe in my presence, and it accurately and completely records my words and actions. October 29, 2018 at 2:03 PM - WESLEY Gross 
_______________________________ WESLEY Gross

## 2018-10-30 ENCOUNTER — OFFICE VISIT (OUTPATIENT)
Dept: ORTHOPEDIC SURGERY | Facility: CLINIC | Age: 38
End: 2018-10-30

## 2018-10-30 VITALS
RESPIRATION RATE: 16 BRPM | WEIGHT: 150 LBS | OXYGEN SATURATION: 99 % | HEIGHT: 71 IN | SYSTOLIC BLOOD PRESSURE: 117 MMHG | HEART RATE: 71 BPM | TEMPERATURE: 97 F | DIASTOLIC BLOOD PRESSURE: 88 MMHG | BODY MASS INDEX: 21 KG/M2

## 2018-10-30 DIAGNOSIS — S62.637A DISPLACED FRACTURE OF DISTAL PHALANX OF LEFT LITTLE FINGER, INITIAL ENCOUNTER FOR CLOSED FRACTURE: Primary | ICD-10-CM

## 2018-10-30 NOTE — PROGRESS NOTES
Ju Hartman is a 40 y.o. male right handed . Worker's Compensation and legal considerations: none filed. Vitals:    10/30/18 1422   BP: 117/88   Pulse: 71   Resp: 16   Temp: 97 °F (36.1 °C)   TempSrc: Oral   SpO2: 99%   Weight: 150 lb (68 kg)   Height: 5' 11\" (1.803 m)   PainSc:   7   PainLoc: Finger           Chief Complaint   Patient presents with    Finger Pain     Left pinky finger pain         HPI: Patient reports pain in his left small finger distally since Saturday. He cannot remember any specific injury but remembers work waking up with pain. He noticed it to be black and blue and then noticed that it was not getting better so he went to the emergency department yesterday    Date of onset:  10/27/2018    Injury: Yes: Comment: cannot remember    Prior Treatment:  Yes: Comment: splinted by ED    Numbness/ Tingling: No    ROS: Review of Systems - General ROS: negative  Respiratory ROS: no cough, shortness of breath, or wheezing  Musculoskeletal ROS: positive for - pain in hand - left  Neurological ROS: negative  Dermatological ROS: negative    Past Medical History:   Diagnosis Date    Asthma 1986    Chronic back pain     Chronic neck pain     Chronic obstructive pulmonary disease ([facility] 6/26/2018    Cluster headache     COPD (chronic obstructive pulmonary disease) ([facility] [address] [facility] 01/09/2017    Mild Obstructive airway disease. See PFT results    Current smoker     Dental caries     Hypertriglyceridemia     Noncompliance with medication regimen 12/19/2016    Overuse of medication 12/19/2016    Scoliosis     Scoliosis of thoracic spine 12/19/2016    Shortness of breath 12/19/2016       History reviewed. No pertinent surgical history. Current Outpatient Medications   Medication Sig Dispense Refill    fluticasone-salmeterol (ADVAIR) 250-50 mcg/dose diskus inhaler Take 1 Puff by inhalation every twelve (12) hours.  For COPD 3 Inhaler 3    albuterol-ipratropium (DUO-NEB) 2.5 mg-0.5 mg/3 ml nebu 3 mL by Nebulization route every six (6) hours as needed. For COPD exacerbation 30 Nebule 1    albuterol (PROVENTIL HFA, VENTOLIN HFA, PROAIR HFA) 90 mcg/actuation inhaler Take 2 Puffs by inhalation every six (6) hours as needed for Wheezing or Shortness of Breath. 3 Inhaler 3    ibuprofen (MOTRIN) 600 mg tablet Take 1 Tab by mouth every six (6) hours as needed for Pain. 20 Tab 0    omega-3 acid ethyl esters (LOVAZA) 1 gram capsule Take 1 Cap by mouth daily (with breakfast). For triglycerides 90 Cap 3    Cholecalciferol, Vitamin D3, (VITAMIN D3) 1,000 unit cap Take  by mouth.  HYDROcodone-acetaminophen (NORCO) 5-325 mg per tablet Take 1 Tab by mouth every four (4) hours as needed for Pain. Max Daily Amount: 6 Tabs. 10 Tab 0       No Known Allergies      PE: Left hand: Small finger: There is ecchymosis and edema about the dorsal aspect of the left small finger just distal to the DIP joint. Sensation is intact distally and cap refill is less than 2 seconds. He is exquisitely tender over this area. He presented today with a splint over that finger. Imaging: Plain films reviewed from yesterday show a minimally displaced left small finger distal phalanx fracture and transverse orientation. ICD-10-CM ICD-9-CM    1. Displaced fracture of distal phalanx of left little finger, initial encounter for closed fracture L26.959Q 816.02        Plan: At this time the patient would like to avoid surgery if possible. Given the minimal displacement of this fracture and minimal angulation, I think a trial of nonoperative treatment is appropriate. We will place in dorsal splint over the PIP and DIP joints for 2 weeks. I have instructed him to keep it clean and dry however he is a . I have told him that if he can find some oversize gloves to cut the finger off the glove and fit over the splint; this may be helpful.     Follow-up in 2 weeks for splint change to a DIP splint to allow for motion of the PIP.     Plan was reviewed with patient, who verbalized agreement and understanding of the plan

## 2018-10-30 NOTE — PATIENT INSTRUCTIONS
Finger Fracture: Care Instructions  Your Care Instructions    Breaks in the bones of the finger usually heal well in about 3 to 4 weeks. The pain and swelling from a broken finger can last for weeks. But it should steadily improve, starting a few days after you break it. It is very important that you wear and take care of the cast or splint exactly as your doctor tells you to so that your finger heals properly and does not end up crooked. Wearing a splint may interfere with your normal activities. Ask for help with daily tasks if you need it. You heal best when you take good care of yourself. Eat a variety of healthy foods, and don't smoke. Follow-up care is a key part of your treatment and safety. Be sure to make and go to all appointments, and call your doctor if you are having problems. It's also a good idea to know your test results and keep a list of the medicines you take. How can you care for yourself at home? · If your doctor put a splint on your finger, wear the splint exactly as directed. Do not remove it until your doctor says that you can. · Keep your hand raised above the level of your heart as much as you can. This will help reduce swelling. · Put ice or a cold pack on your finger for 10 to 20 minutes at a time. Try to do this every 1 to 2 hours for the next 3 days (when you are awake) or until the swelling goes down. Put a thin cloth between the ice and your skin. Keep the splint dry. · Be safe with medicines. Take pain medicines exactly as directed. ? If the doctor gave you a prescription medicine for pain, take it as prescribed. ? If you are not taking a prescription pain medicine, ask your doctor if you can take an over-the-counter medicine. When should you call for help? Call 911 anytime you think you may need emergency care.  For example, call if:    · Your finger is cool or pale or changes color.    Call your doctor now or seek immediate medical care if:    · Your pain gets much worse.     · You have tingling, weakness, or numbness in your finger.     · You have signs of infection, such as:  ? Increased pain, swelling, warmth, or redness. ? Red streaks leading from the area. ? Pus draining from the area. ? Swollen lymph nodes in your neck, armpits, or groin. ? A fever.    Watch closely for changes in your health, and be sure to contact your doctor if:    · Your finger is not steadily improving. Where can you learn more? Go to http://neptali-abhinav.info/. Enter U025 in the search box to learn more about \"Finger Fracture: Care Instructions. \"  Current as of: November 29, 2017  Content Version: 11.8  © 1368-4141 [employer]. Care instructions adapted under license by [employer] (which disclaims liability or warranty for this information). If you have questions about a medical condition or this instruction, always ask your healthcare professional. [place] [address] any warranty or liability for your use of this information.

## 2018-11-04 ENCOUNTER — TELEPHONE (OUTPATIENT)
Dept: FAMILY MEDICINE CLINIC | Age: 38
End: 2018-11-04

## 2018-11-07 NOTE — TELEPHONE ENCOUNTER
Medication: Proventil HFA, dose: 2 puffs, how often: every 6 hours as needed for wheezing, current number of medication days provided: 90, refill per application. Lot# [id number], EXP 11/2019 . This medication was received and verified for the following 1. Correct Patient, 2. Correct Diagnosis, 3. Correct Drug, 4. Correct route, and no current allergy to medication. Please contact patient to come  their medications.      ALFREDO Nicole,RN,[facility]

## 2018-11-13 ENCOUNTER — OFFICE VISIT (OUTPATIENT)
Dept: ORTHOPEDIC SURGERY | Facility: CLINIC | Age: 38
End: 2018-11-13

## 2018-11-13 VITALS
WEIGHT: 153 LBS | TEMPERATURE: 97.7 F | OXYGEN SATURATION: 97 % | SYSTOLIC BLOOD PRESSURE: 123 MMHG | HEIGHT: 71 IN | DIASTOLIC BLOOD PRESSURE: 67 MMHG | BODY MASS INDEX: 21.42 KG/M2 | HEART RATE: 76 BPM

## 2018-11-13 DIAGNOSIS — S62.667D CLOSED NONDISPLACED FRACTURE OF DISTAL PHALANX OF LEFT LITTLE FINGER WITH ROUTINE HEALING, SUBSEQUENT ENCOUNTER: Primary | ICD-10-CM

## 2018-11-13 DIAGNOSIS — M79.645 PAIN OF FINGER OF LEFT HAND: ICD-10-CM

## 2018-11-13 NOTE — PATIENT INSTRUCTIONS
Broken Hand: Care Instructions  Your Care Instructions  A hand can break (fracture) during sports, a fall, or other accidents. The break may happen when your hand twists, is hit, or is used to protect you in a fall. Fractures can range from a small, hairline crack, to a bone or bones broken into two or more pieces. Your treatment depends on how bad the break is. Your doctor may have put your hand in a brace, splint, or cast to allow it to heal or to keep it stable until you see another doctor. It may take weeks or months for your hand to heal. You can help it heal with some care at home. You heal best when you take good care of yourself. Eat a variety of healthy foods, and don't smoke. Follow-up care is a key part of your treatment and safety. Be sure to make and go to all appointments, and call your doctor if you are having problems. It's also a good idea to know your test results and keep a list of the medicines you take. How can you care for yourself at home? · Put ice or a cold pack on your hand for 10 to 20 minutes at a time. Try to do this every 1 to 2 hours for the next 3 days (when you are awake). Put a thin cloth between the ice and your cast or splint. Keep your cast or splint dry. · Follow the cast care instructions your doctor gives you. If you have a splint, do not take it off unless your doctor tells you to. · Be safe with medicines. Take pain medicines exactly as directed. ? If the doctor gave you a prescription medicine for pain, take it as prescribed. ? If you are not taking a prescription pain medicine, ask your doctor if you can take an over-the-counter medicine. · Prop up your hand on pillows when you sit or lie down in the first few days after the injury. Keep your hand higher than the level of your heart. This will help reduce swelling. · Follow instructions for exercises to keep your arm strong.   · Wiggle your uninjured fingers often to reduce swelling and stiffness, but do not use that hand to grasp or carry anything. When should you call for help? Call your doctor now or seek immediate medical care if:    · You have new or worse pain.     · Your hand or fingers are cool or pale or change color.     · Your cast or splint feels too tight.     · You have tingling, weakness, or numbness in your hand or fingers.    Watch closely for changes in your health, and be sure to contact your doctor if:    · You do not get better as expected.     · You have problems with your cast or splint. Where can you learn more? Go to http://neptali-abhinav.info/. Enter ([phone] [phone] in the search box to learn more about \"Broken Hand: Care Instructions. \"  Current as of: November 29, 2017  Content Version: 11.8  © 9107-3260 [employer]. Care instructions adapted under license by [employer] (which disclaims liability or warranty for this information). If you have questions about a medical condition or this instruction, always ask your healthcare professional. [place] [address] any warranty or liability for your use of this information.

## 2018-11-13 NOTE — PROGRESS NOTES
Ju Hartman is a 40 y.o. male right handed . Worker's Compensation and legal considerations: none filed. Vitals:    11/13/18 1426   BP: 123/67   Pulse: 76   Temp: 97.7 °F (36.5 °C)   SpO2: 97%   Weight: 153 lb (69.4 kg)   Height: 5' 11\" (1.803 m)           Chief Complaint   Patient presents with    Finger Pain     LEFT PINKY FINGER PAIN        HPI:  Patient returns 2 weeks status post left small finger distal phalanx fracture. He has been in a PIP and DIP splint since his last visit 2 weeks ago. He has maintained the splint and is not taking it off. He reports minimal pain. He reports bumping it a couple times that cause minimal pain. Previous HPI: Patient reports pain in his left small finger distally since Saturday. He cannot remember any specific injury but remembers work waking up with pain. He noticed it to be black and blue and then noticed that it was not getting better so he went to the emergency department yesterday    Date of onset:  10/27/2018    Injury: Yes: Comment: cannot remember    Prior Treatment:  Yes: Comment: splinted by ED and placed in a PIP and DIP splint by myself 2 weeks ago    Numbness/ Tingling: No    ROS: Review of Systems - General ROS: negative  Respiratory ROS: no cough, shortness of breath, or wheezing  Musculoskeletal ROS: positive for - pain in hand - left  Neurological ROS: negative  Dermatological ROS: negative    Past Medical History:   Diagnosis Date    Asthma 1986    Chronic back pain     Chronic neck pain     Chronic obstructive pulmonary disease ([facility] [address] 6/26/2018    Cluster headache     COPD (chronic obstructive pulmonary disease) ([facility] [address] 01/09/2017    Mild Obstructive airway disease.  See PFT results    Current smoker     Dental caries     Hypertriglyceridemia     Noncompliance with medication regimen 12/19/2016    Overuse of medication 12/19/2016    Scoliosis     Scoliosis of thoracic spine 12/19/2016    Shortness of breath 12/19/2016 History reviewed. No pertinent surgical history. Current Outpatient Medications   Medication Sig Dispense Refill    fluticasone-salmeterol (ADVAIR) 250-50 mcg/dose diskus inhaler Take 1 Puff by inhalation every twelve (12) hours. For COPD 3 Inhaler 3    albuterol-ipratropium (DUO-NEB) 2.5 mg-0.5 mg/3 ml nebu 3 mL by Nebulization route every six (6) hours as needed. For COPD exacerbation 30 Nebule 1    albuterol (PROVENTIL HFA, VENTOLIN HFA, PROAIR HFA) 90 mcg/actuation inhaler Take 2 Puffs by inhalation every six (6) hours as needed for Wheezing or Shortness of Breath. 3 Inhaler 3    HYDROcodone-acetaminophen (NORCO) 5-325 mg per tablet Take 1 Tab by mouth every four (4) hours as needed for Pain. Max Daily Amount: 6 Tabs. 10 Tab 0    ibuprofen (MOTRIN) 600 mg tablet Take 1 Tab by mouth every six (6) hours as needed for Pain. 20 Tab 0    omega-3 acid ethyl esters (LOVAZA) 1 gram capsule Take 1 Cap by mouth daily (with breakfast). For triglycerides 90 Cap 3    Cholecalciferol, Vitamin D3, (VITAMIN D3) 1,000 unit cap Take  by mouth. No Known Allergies      PE: Left hand: Small finger: Splint removed today. There is minimal tenderness to palpation about the distal phalanx of the small finger. New splint placed over only the DIP. Patient started moving PIP with minimal difficulty. Imaging: Plain films reviewed from yesterday show a minimally displaced left small finger distal phalanx fracture and transverse orientation. Plain films today show no interval displacement and very mild evidence of callus formation at the distal phalanx fracture site. ICD-10-CM ICD-9-CM    1. Closed nondisplaced fracture of distal phalanx of left little finger with routine healing, subsequent encounter S62.667D V54.19    2. Pain of finger of left hand M79.645 729.5 AMB POC XRAY, FINGER(S), 2+ VIEWS       Plan: Again the patient would like to avoid surgery if possible.     Given the minimal displacement of this fracture and minimal angulation, I think nonoperative treatment is appropriate. This fracture has not displaced over the past 2 weeks. Today I have placed him in a DIP splint. Again I have given him instructions to wear the splint strictly not to remove it. Follow-up in 4 weeks for reevaluation and x-rays out of the splint.     Plan was reviewed with patient, who verbalized agreement and understanding of the plan

## 2018-12-12 ENCOUNTER — OFFICE VISIT (OUTPATIENT)
Dept: ORTHOPEDIC SURGERY | Facility: CLINIC | Age: 38
End: 2018-12-12

## 2018-12-12 VITALS
WEIGHT: 148.4 LBS | HEIGHT: 71 IN | BODY MASS INDEX: 20.77 KG/M2 | DIASTOLIC BLOOD PRESSURE: 80 MMHG | HEART RATE: 79 BPM | TEMPERATURE: 97.4 F | RESPIRATION RATE: 18 BRPM | OXYGEN SATURATION: 100 % | SYSTOLIC BLOOD PRESSURE: 118 MMHG

## 2018-12-12 DIAGNOSIS — S62.667D CLOSED NONDISPLACED FRACTURE OF DISTAL PHALANX OF LEFT LITTLE FINGER WITH ROUTINE HEALING, SUBSEQUENT ENCOUNTER: Primary | ICD-10-CM

## 2018-12-12 NOTE — PROGRESS NOTES
Kathie Moss is a 45 y.o. male right handed . Worker's Compensation and legal considerations: none filed. Vitals:    12/12/18 1349   BP: 118/80   Pulse: 79   Resp: 18   Temp: 97.4 °F (36.3 °C)   TempSrc: Oral   SpO2: 100%   Weight: 148 lb 6.4 oz (67.3 kg)   Height: 5' 11\" (1.803 m)   PainSc:   0 - No pain   PainLoc: Hand           Chief Complaint   Patient presents with    Hand Pain     Left pinky     HPI: Patient comes in today 6 weeks status post left small finger distal phalanx fracture. At the last visit 4 weeks ago he was placed into a DIP splint which was transitioned from a full finger splint. He reports his pain is much improved compared to previously. He reports the splint fell off. Today he says he is able to move his finger almost completely to a fist.     Previous HPI:  Patient returns 2 weeks status post left small finger distal phalanx fracture. He has been in a PIP and DIP splint since his last visit 2 weeks ago. He has maintained the splint and is not taking it off. He reports minimal pain. He reports bumping it a couple times that cause minimal pain. Date of onset:  10/27/2018    Injury: Yes: Comment: cannot remember    Prior Treatment:  Yes: Comment: splinted by ED and placed in a PIP and DIP splint by myself 2 weeks ago    Numbness/ Tingling: No    ROS: Review of Systems - General ROS: negative  Respiratory ROS: no cough, shortness of breath, or wheezing  Musculoskeletal ROS: positive for - pain in hand - left  Neurological ROS: negative  Dermatological ROS: negative    Past Medical History:   Diagnosis Date    Asthma 1986    Chronic back pain     Chronic neck pain     Chronic obstructive pulmonary disease ([facility] [address] 6/26/2018    Cluster headache     COPD (chronic obstructive pulmonary disease) ([facility] [address] 01/09/2017    Mild Obstructive airway disease.  See PFT results    Current smoker     Dental caries     Hypertriglyceridemia     Noncompliance with medication regimen 12/19/2016    Overuse of medication 12/19/2016    Scoliosis     Scoliosis of thoracic spine 12/19/2016    Shortness of breath 12/19/2016       History reviewed. No pertinent surgical history. Current Outpatient Medications   Medication Sig Dispense Refill    fluticasone-salmeterol (ADVAIR) 250-50 mcg/dose diskus inhaler Take 1 Puff by inhalation every twelve (12) hours. For COPD 3 Inhaler 3    albuterol (PROVENTIL HFA, VENTOLIN HFA, PROAIR HFA) 90 mcg/actuation inhaler Take 2 Puffs by inhalation every six (6) hours as needed for Wheezing or Shortness of Breath. 3 Inhaler 3    omega-3 acid ethyl esters (LOVAZA) 1 gram capsule Take 1 Cap by mouth daily (with breakfast). For triglycerides 90 Cap 3    Cholecalciferol, Vitamin D3, (VITAMIN D3) 1,000 unit cap Take  by mouth.  albuterol-ipratropium (DUO-NEB) 2.5 mg-0.5 mg/3 ml nebu 3 mL by Nebulization route every six (6) hours as needed. For COPD exacerbation 30 Nebule 1    HYDROcodone-acetaminophen (NORCO) 5-325 mg per tablet Take 1 Tab by mouth every four (4) hours as needed for Pain. Max Daily Amount: 6 Tabs. 10 Tab 0    ibuprofen (MOTRIN) 600 mg tablet Take 1 Tab by mouth every six (6) hours as needed for Pain. 20 Tab 0       No Known Allergies      PE: Left hand: Small finger: Splint removed today. There is no tenderness to palpation about the DIP joint or the distal phalanx. Sensation is intact distally. He has range of motion of his DIP and his PIP. He is able to make almost a complete fist.  His deficit is only in terminal flexion of his DIP. Imaging: Plain films reviewed today did not show any displacement of the distal phalanx fracture that is about the shaft. There is minimal callus formation. ICD-10-CM ICD-9-CM    1. Closed nondisplaced fracture of distal phalanx of left little finger with routine healing, subsequent encounter S62.667D V54.19 AMB POC XRAY, FINGER(S), 2+ VIEWS       Plan:  At this point given the appropriate alignment of the distal phalanx I will allow him to start using his finger and gradually increase his activity. There is likely a fibrous union at the distal phalanx. He still has the potential to develop more callus however I would like him to start working on range of motion of his DIP joint. I have given him exercises to isolate the DIP joint and work on range of motion. Follow-up in 6 weeks for reevaluation and x-rays.     Plan was reviewed with patient, who verbalized agreement and understanding of the plan

## 2018-12-12 NOTE — PATIENT INSTRUCTIONS
Finger Fracture: Rehab Exercises  Your Care Instructions  Here are some examples of typical rehabilitation exercises for your condition. Start each exercise slowly. Ease off the exercise if you start to have pain. Your doctor or your physical or occupational therapist will tell you when you can start these exercises and which ones will work best for you. How to do the exercises  Finger extension    1. Place your hand flat on a table, palm down. 2. Lift and then lower your affected finger off the table. 3. Repeat 8 to 12 times. MP extension    1. Place your good hand on a table, palm up. Put your hand with the affected finger on top of your good hand with your fingers wrapped around the thumb of your good hand like you are making a fist.  2. Slowly uncurl the joints of your hand with the affected finger where your fingers connect to your hand so that only the top two joints of your fingers are bent. Your fingers will look like a hook. 3. Move back to your starting position, with your fingers wrapped around your good thumb. 4. Repeat 8 to 12 times. DIP flexion    1. With your good hand, grasp your affected finger. Your thumb will be on the top side of your finger just below the joint that is closest to your fingernail. 2. Slowly bend your affected finger only at the joint closest to your fingernail. Hold for about 6 seconds. 3. Repeat 8 to 12 times. PIP extension (with MP extension)    1. Place your good hand on a table, palm up. Put your hand with the affected finger on top of your good hand. 2. Use the thumb and fingers of your good hand to grasp below the middle joint of your affected finger. 3. Bend and then straighten the last two joints of your affected finger. 4. Repeat 8 to 12 times. Isolated PIP flexion    1. Place the hand with the affected finger flat on a table, palm up. With your other hand, press down on the fingers that are not affected.  Your affected finger will be free to move.  2. Slowly bend your affected finger. Hold for about 6 seconds. Then straighten your finger. 3. Repeat 8 to 12 times. Imaginary ball squeeze    1. Pretend to hold an imaginary ball. 2. Slowly bend your fingers around the imaginary ball, and squeeze the \"ball\" for about 6 seconds. Then slowly straighten your fingers to release the \"ball. \"  3. Repeat 8 to 12 times. Tendon glides    1. In this exercise, the steps follow one another to a make a continuous movement. 2. Hold your hand upward. Your fingers and thumb will be pointing straight up. Your wrist should be relaxed, following the line of your fingers and thumb. 3. Curl your fingers so that the top two joints in them are bent, and your fingers wrap down. Your fingertips should touch or be near the base of your fingers. Your fingers will look like a hook. 4. Make a fist by bending your knuckles. Your thumb can gently rest against your index (pointing) finger. 5. Unwind your fingers slightly so that your fingertips can touch the base of your palm. Your thumb can rest against your index finger. 6. Move back to your starting position, with your fingers and thumb pointing up. 7. Repeat the series of motions 8 to 12 times. Towel squeeze    1. Place a small towel roll on a table. 2. With your palm facing down, grab the towel and squeeze it for about 6 seconds. Then slowly straighten your fingers to release the towel. 3. Repeat 8 to 12 times. Towel grab    1. Fold a small towel in half, and lay it flat on a table. 2. Put your hand flat on the towel, palm down. Grab the towel, and scrunch it toward you until your hand is in a fist.  3. Slowly straighten your fingers to push the towel back so it is flat on the table again. 4. Repeat 8 to 12 times. Follow-up care is a key part of your treatment and safety. Be sure to make and go to all appointments, and call your doctor if you are having problems.  It's also a good idea to know your test results and keep a list of the medicines you take. Where can you learn more? Go to http://neptali-abhinav.info/. Enter V403 in the search box to learn more about \"Finger Fracture: Rehab Exercises. \"  Current as of: November 29, 2017  Content Version: 11.8  © 9803-4393 [employer]. Care instructions adapted under license by [employer] (which disclaims liability or warranty for this information). If you have questions about a medical condition or this instruction, always ask your healthcare professional. [address] any warranty or liability for your use of this information.

## 2019-01-30 NOTE — PROGRESS NOTES
[address]  [facility], [address] [place] [address]  [phone] office/[phone] fax      2/5/2019    Reason for visit:   Chief Complaint   Patient presents with    Follow Up Chronic Condition       Patient: Bela Silver, 1980, [id number]       Primary MD: Castro Lloyd NP    Subjective:   Bela Silver, a 45 y.o. male, who presents for Follow Up Chronic Condition      Past Medical History:   Diagnosis Date    Asthma 1986    Chronic back pain     Chronic neck pain     Chronic obstructive pulmonary disease ([facility] 6/26/2018    Cluster headache     COPD (chronic obstructive pulmonary disease) ([facility] 01/09/2017    Mild Obstructive airway disease. See PFT results    Current smoker     Dental caries     Hypertriglyceridemia     Noncompliance with medication regimen 12/19/2016    Overuse of medication 12/19/2016    Scoliosis     Scoliosis of thoracic spine 12/19/2016    Shortness of breath 12/19/2016       No past surgical history on file. Social History     Socioeconomic History    Marital status: SINGLE     Spouse name: Not on file    Number of children: 0    Years of education: Not on file    Highest education level: Not on file   Social Needs    Financial resource strain: Not on file    Food insecurity - worry: Not on file    Food insecurity - inability: Not on file   [employer] needs - medical: Not on file   [employer] needs - non-medical: Not on file   Occupational History    Not on file   Tobacco Use    Smoking status: Current Every Day Smoker     Packs/day: 0.80     Types: Cigarettes    Smokeless tobacco: Never Used   Substance and Sexual Activity    Alcohol use:  Yes    Drug use: No    Sexual activity: Not on file   Other Topics Concern     Service No    Blood Transfusions No    Caffeine Concern No    Occupational Exposure No    Hobby Hazards No    Sleep Concern Yes    Stress Concern No    Weight Concern No    Special Diet No    Back Care Yes     Comment: scoliosis    Exercise Yes    Bike Helmet No    Seat Belt Yes    Self-Exams No   Social History Narrative    Not on file       No Known Allergies    Current Outpatient Medications on File Prior to Visit   Medication Sig Dispense Refill    fluticasone-salmeterol (ADVAIR) 250-50 mcg/dose diskus inhaler Take 1 Puff by inhalation every twelve (12) hours. For COPD 3 Inhaler 3    albuterol (PROVENTIL HFA, VENTOLIN HFA, PROAIR HFA) 90 mcg/actuation inhaler Take 2 Puffs by inhalation every six (6) hours as needed for Wheezing or Shortness of Breath. 3 Inhaler 3    ibuprofen (MOTRIN) 600 mg tablet Take 1 Tab by mouth every six (6) hours as needed for Pain. 20 Tab 0    albuterol-ipratropium (DUO-NEB) 2.5 mg-0.5 mg/3 ml nebu 3 mL by Nebulization route every six (6) hours as needed. For COPD exacerbation 30 Nebule 1    HYDROcodone-acetaminophen (NORCO) 5-325 mg per tablet Take 1 Tab by mouth every four (4) hours as needed for Pain. Max Daily Amount: 6 Tabs. 10 Tab 0    omega-3 acid ethyl esters (LOVAZA) 1 gram capsule Take 1 Cap by mouth daily (with breakfast). For triglycerides 90 Cap 3    Cholecalciferol, Vitamin D3, (VITAMIN D3) 1,000 unit cap Take  by mouth. No current facility-administered medications on file prior to visit. Review of Systems   Constitutional: Negative. HENT: Negative. Eyes: Negative. Respiratory: Negative for sputum production, shortness of breath and wheezing. Smoke cigs   Cardiovascular: Negative. Gastrointestinal: Negative for heartburn (I have heartburn sometimes. ). Genitourinary: Negative. Musculoskeletal: Positive for joint pain. Left elbow pain. Started back in Oct when I fell and broke my left hand pinky. I was drinking a lot at a [employer]. Finger not bending all the way anymore. cristobal Farley MD, took care of me.  I didn't go back to the last appt because I cant afford the appts.    Skin:        These plantar warts are multiplying. They are everywhere on my feet. I am scared they will spread all over my body. Maybe get into my blood stream and cause a clot. There has to be something I can do to stop these warts from growing. I dont have money right now to go to podiatry but I will start saving for it. I hope to get ABRAM soon. Neurological: Negative. Endo/Heme/Allergies: Negative. Psychiatric/Behavioral: Positive for depression. I am having mood swings. I have trouble holding my anger. I have a lot of issues going on right now. I am willing to try something. I need something to slow me down. Objective:   Visit Vitals  /81   Pulse 78   Temp 98.4 °F (36.9 °C)   Resp 20   Ht 5' 11\" (1.803 m)   Wt 146 lb (66.2 kg)   SpO2 99%   BMI 20.36 kg/m²      Wt Readings from Last 3 Encounters:   02/05/19 146 lb (66.2 kg)   12/12/18 148 lb 6.4 oz (67.3 kg)   11/13/18 153 lb (69.4 kg)     Lab Results   Component Value Date/Time    Glucose 95 03/01/2018 09:19 AM         Physical Exam   Constitutional: He is oriented to person, place, and time. Slender build   HENT:   Head: Atraumatic. Neck: Neck supple. Cardiovascular: Normal rate, regular rhythm and normal heart sounds. Pulmonary/Chest: Effort normal and breath sounds normal.   Abdominal: Bowel sounds are normal.   Musculoskeletal: Normal range of motion. Feet:    Neurological: He is alert and oriented to person, place, and time. Psychiatric: His mood appears anxious. His speech is rapid and/or pressured. He is hyperactive. Thought content is not paranoid. Cognition and memory are not impaired. He does not express impulsivity. He expresses no suicidal plans and no homicidal plans. Pt is very hyperactive. Appears anxiety ridden over his health. Assessment:    Richard Lazaro who has risk factors including (see above previous medical hx) and:       ICD-10-CM ICD-9-CM    1.  Chronic obstructive pulmonary disease, unspecified COPD type ([facility] J44.9 496    2. Plantar wart B07.0 078.12    3. Depression with anxiety F41.8 300.4 FLUoxetine (PROZAC) 20 mg capsule      FLUoxetine (PROZAC) 20 mg capsule   4. Hyperactive F90.9 314.9 TSH 3RD GENERATION      T4, FREE      T3, FREE   5. Anxiety about health F41.8 300.09    6. Current smoker F17.200 305.1    7. Counseling on health promotion and disease prevention Z71.89 V65.49    8. Screening procedure Z13.9 V82.9 LIPID PANEL      METABOLIC PANEL, COMPREHENSIVE       1. Chronic obstructive pulmonary disease, unspecified COPD type ([facility] [address] [facility]  Cont current therapy      2. Plantar wart      3. Depression with anxiety  Notified pt to go to the ED if he feels suicidal or homicidal at any time. Hotline and other resource information was given to the patient. Encouraged pt to schedule an appt with Isadora Baldwin mental health NP. Instructed pt to notify office in 4 weeks of the effectiveness of the prozac. If not effective, will increase to 40mg. Pt verb understanding    - FLUoxetine (PROZAC) 20 mg capsule; Take 1 Cap by mouth daily. For anxiety/depression  Dispense: 30 Cap; Refill: 0  - FLUoxetine (PROZAC) 20 mg capsule; Take 1 Cap by mouth daily. For anxiety/depression  Dispense: 90 Cap; Refill: 3    4. Hyperactive  Pt appears to be classic case of ADHD. He is always very hyper when he comes in for appts. His concentration is minimal to none. His conversation flip lop back and forth. See #7    5. Anxiety about health  Pt googles health issues and then focuses on these issues and makes them his own. Pt focuses on his health but has not been willing to initiate any anxiety treatment until he mentioned it today. Hopefully prozac will give pt some relief. 6. Current smoker  Counseled patient on the dangers of tobacco use, and was advised to quit. Reviewed strategies to maximize success, including the use of Chantix.   Discussed the risks of continued tobacco use such as elevated blood pressure, vascular irritation with increased incidence of CVD with stroke or MI and PVD causing claudication, lung damage that could lead to COPD, cancer and death. Encouraged an approach to find a few healthy habits, write them down then plan to decrease their cigarette use by one each week till they are gone all together and to plan for stress that may cause them to want to restart and how to prevent it by having new coping mechanisms in place. 1-800-QUIT-NOW provided for counseling     7. Counseling on health promotion and disease prevention  Pt refuses to see psych, derm, or podiatry at this time only because he does not have the money to pay out of pocket. Pt stated he will notify office when he gets ABRAM or the money to pay out of pocket to see psych (hyperactivity and depression), dermatology (mass on finger), and podiatry (plantar warts)    Written instructions followed our verbal discussion of all information discussed above, pending tests ordered and future goals/plans. Patient expressed understanding of current diagnosis, planned testing, follow up and if needed to contact the office for any questions or concerns prior to the next visit. Plan:   Med reconciliation completed with patient. Reviewed side effects of medications with the patient. Questions were answered and patient verb understanding.       Discussed lab results with patient  None     Orders Placed This Encounter    LIPID PANEL     Standing Status:   Future     Standing Expiration Date:   4/75/3820    METABOLIC PANEL, COMPREHENSIVE     Standing Status:   Future     Standing Expiration Date:   7/31/2019    TSH 3RD GENERATION     Standing Status:   Future     Standing Expiration Date:   7/31/2019    T4, FREE     Standing Status:   Future     Standing Expiration Date:   7/31/2019    T3, FREE     Standing Status:   Future     Standing Expiration Date:   7/31/2019    FLUoxetine (PROZAC) 20 mg capsule     Sig: Take 1 Cap by mouth daily. For anxiety/depression     Dispense:  30 Cap     Refill:  0     Order Specific Question:   Expiration Date     Answer:   4/30/2021     Order Specific Question:   Lot#     Answer:   [id number]     Order Specific Question:        Answer:   Bacilio Cervantes FLUoxetine (PROZAC) 20 mg capsule     Sig: Take 1 Cap by mouth daily. For anxiety/depression     Dispense:  90 Cap     Refill:  3     Current Outpatient Medications   Medication Sig Dispense Refill    FLUoxetine (PROZAC) 20 mg capsule Take 1 Cap by mouth daily. For anxiety/depression 30 Cap 0    FLUoxetine (PROZAC) 20 mg capsule Take 1 Cap by mouth daily. For anxiety/depression 90 Cap 3    fluticasone-salmeterol (ADVAIR) 250-50 mcg/dose diskus inhaler Take 1 Puff by inhalation every twelve (12) hours. For COPD 3 Inhaler 3    albuterol (PROVENTIL HFA, VENTOLIN HFA, PROAIR HFA) 90 mcg/actuation inhaler Take 2 Puffs by inhalation every six (6) hours as needed for Wheezing or Shortness of Breath. 3 Inhaler 3    ibuprofen (MOTRIN) 600 mg tablet Take 1 Tab by mouth every six (6) hours as needed for Pain. 20 Tab 0    albuterol-ipratropium (DUO-NEB) 2.5 mg-0.5 mg/3 ml nebu 3 mL by Nebulization route every six (6) hours as needed. For COPD exacerbation 30 Nebule 1    HYDROcodone-acetaminophen (NORCO) 5-325 mg per tablet Take 1 Tab by mouth every four (4) hours as needed for Pain. Max Daily Amount: 6 Tabs. 10 Tab 0    omega-3 acid ethyl esters (LOVAZA) 1 gram capsule Take 1 Cap by mouth daily (with breakfast). For triglycerides 90 Cap 3    Cholecalciferol, Vitamin D3, (VITAMIN D3) 1,000 unit cap Take  by mouth. Follow-up Disposition:  Return in about 4 months (around 6/5/2019) for Follow up.    labs needed for 4 month follow-up appt  Thy levels, CMP, Lipids    Jaymie Reeves, RN, MSN, AGNP-C   [id number]      I spent 30 minutes with the patient in face-to-face consultation, of which greater than 50% was spent in counseling and coordination of care as described above.

## 2019-02-05 ENCOUNTER — OFFICE VISIT (OUTPATIENT)
Dept: FAMILY MEDICINE CLINIC | Age: 39
End: 2019-02-05

## 2019-02-05 VITALS
TEMPERATURE: 98.4 F | BODY MASS INDEX: 20.44 KG/M2 | HEART RATE: 78 BPM | HEIGHT: 71 IN | OXYGEN SATURATION: 99 % | RESPIRATION RATE: 20 BRPM | DIASTOLIC BLOOD PRESSURE: 81 MMHG | SYSTOLIC BLOOD PRESSURE: 141 MMHG | WEIGHT: 146 LBS

## 2019-02-05 DIAGNOSIS — Z71.89 COUNSELING ON HEALTH PROMOTION AND DISEASE PREVENTION: ICD-10-CM

## 2019-02-05 DIAGNOSIS — B07.0 PLANTAR WART: ICD-10-CM

## 2019-02-05 DIAGNOSIS — F90.9 HYPERACTIVE: ICD-10-CM

## 2019-02-05 DIAGNOSIS — F41.8 DEPRESSION WITH ANXIETY: ICD-10-CM

## 2019-02-05 DIAGNOSIS — F17.200 CURRENT SMOKER: Chronic | ICD-10-CM

## 2019-02-05 DIAGNOSIS — F41.8 ANXIETY ABOUT HEALTH: ICD-10-CM

## 2019-02-05 DIAGNOSIS — J44.9 CHRONIC OBSTRUCTIVE PULMONARY DISEASE, UNSPECIFIED COPD TYPE (HCC): Primary | ICD-10-CM

## 2019-02-05 DIAGNOSIS — Z13.9 SCREENING PROCEDURE: ICD-10-CM

## 2019-02-05 RX ORDER — FLUOXETINE HYDROCHLORIDE 20 MG/1
20 CAPSULE ORAL DAILY
Qty: 90 CAP | Refills: 3 | Status: SHIPPED | COMMUNITY
Start: 2019-02-05 | End: 2019-06-04

## 2019-02-05 RX ORDER — FLUOXETINE HYDROCHLORIDE 20 MG/1
20 CAPSULE ORAL DAILY
Qty: 30 CAP | Refills: 0 | Status: SHIPPED | COMMUNITY
Start: 2019-02-05 | End: 2019-04-16 | Stop reason: SDUPTHER

## 2019-02-05 NOTE — PATIENT INSTRUCTIONS
The [facility] reminders! Foundation Operating Hours: These may change without notice. Mon- Wed 7am to 5pm. Closed for lunch 12-1pm  Thurs 7am to 12pm  Fridays closed     Office number:     **In case of inclement weather (snow and/or ice) please do not try to come into the office for your appointment. Please call in and you will not be counted as a \"No Show. \" SAFETY FIRST!!**    NO SHOW POLICY ~ If a patient has 3 no shows for an appointment with their Provider, Mental Health Provider, or the Nurse Navigator, they will be discharged from the practice for 6 months. Medication ordering will also be suspended. If the patient is discharged from the [facility], they can go to the [place] [address] or [address] [place] [address] where they can be seen for their primary care needs plus obtain the same type of medications as they received at the [facility]. To avoid being discharged the patient must call the office at [phone] 24 hours prior to their appointment if they need to cancel or reschedule, arrive to their appointments on time (preferrably 15 minutes early) (If you are late you will not be seen) and come to all scheduled appointments with the provider, mental health, and/or nurse navigator. If the patient is discharged from the practice, they can apply to be re-established after 6 months. Lab work:    Unless you are instructed differently, please return to the office between the hours of 7 am and 11:00 am Monday through Thursday to have your labs drawn one to two weeks before your next scheduled PROVIDER appointment. If you do not have an appointment to follow up on these results, please make one or plan to call the office if you do not hear from us to get the results. No news does not mean good news. Medications:   Medication  times are firm:  Mondays and Tuesdays from 1pm-5pm  Wednesdays and Thursdays from 8am-11:30am  These hours are subject to change without notice.     The [facility] or [facility] will assist you with your medications when available as not all medications can be obtained through this program. Medications are added and deleted from the [address] as deemed necessary by the pharmaceutical companies. There is a chance that a medication you currently receive from [facility] may be discontinued. If this occurs an alternative medication will be sent to a local pharmacy for you to obtain and pay for. If your medications are new or have changed, and you get your medications from the [facility]. Jonny [address] [place] [address]), you MUST talk to the pharmacy staff to sign the new prescription applications. If you don't sign the applications we cannot get the medications for you. It usually takes 6-8 weeks for your medications to arrive. The Pharmacy staff will call you when your medications are available. If you don't hear from them you call [phone] and inquire about your medicines. You are responsible for obtaining your medications. You will have 30 days to come in and  your medications. If you don't  your medicines within those 30 days, those medicines may be placed on the self as samples and you will have to start all over again by completing the applications and waiting the 6-8 weeks for your medicines to arrive. Foot Care: Local ministry through [facility] ([address] [facility])  Every second Tuesday of the month (except for holidays and election days) from 9am to 1 pm. The services provided by these ministry volunteers are free of charge with the option to donate. They will inspect your feet thoroughly, soak them for 10 minutes, cut and file your nails. They care for diabetics as well. Keep in mind this service is free and will be on a first come first serve basis. Bad teeth? Ask about the Dental Clinic to get you in front of a local dentist when a dentist is available. They only extract teeth. No fillings, root canals, or dentures.  The bus leaves the second Thursday of the month for those on the list. (Ask about availability as these appointments are limited). If you are scheduled for the dentist and you fail to come into the Foundation to meet the bus, you WILL NOT be placed on the dental list again. IMPORTANT: if you have high blood pressure please take your blood pressure  medications before arriving to the Foundation. If your blood pressure is elevated  the dental clinic WILL NOT extract any teeth and you will not have another  opportunity to go to the clinic. DIABETES:   Do you have uncontrolled diabetes or you just want to learn more about your diabetes? Schedule with the Nurse navigator for our new 5-week Diabetes program. You will learn how to properly manage your diabetes: nutrition, exercise, medication therapy. Eye exams for Diabetics. Please let us know so we can add you to the list to see an eye doctor. These  appointments are limited. You will receive a free eye exam and free glasses if  needed. Unfortunately, if you are not a diabetic, we do not have a free service  for eye exams for you (yet!). We do have information on where to  go to get a  huge discount on eye exams and glasses. Sick visits: If you are sick and it is not an emergency call the office to schedule an appointment to see the provider. Care in the office is FREE but charges will be applied if you go to the Emergency room. If you feel better and do not wish to attend your sick appointment please call  the office and cancel to avoid a no-show. Charges and cost items from the Foundation:    Most of our orders are covered by [facility] [employer] but there ARE SOME CHARGES for items such as radiology interpretations and anesthesiology during procedures and surgeries that are not covered under [employer].       MedAssist   Please make sure you have contacted [employer] to check on your payment options:   1 [phone] Mon - Fri 8-4pm. It is important that you are screened in order to qualify for assistance and to avoid huge medical charges. This service is to benefit you. The [facility] is not responsible for ANY charges you may accrue regardless of who ordered the medication, procedure, treatment or test. If you go to the Emergency Room, you WILL be charged! Behavior and emotional issues! It is stressful to be sick, have an illness, take medications, not have a job, not have medical insurance, have family issues or just getting older! Schedule an appointment with our mental health provider. She is in the office Mondays and Wednesdays from 8am to [address] [facility] can also contact the following: The national suicide hotline (3-242-226-IMJE or 4-225.285.3878)    [address], [address] [place] [address] [address] [place] [address]   [phone]    Drug and Alcohol Addiction Issues! It is hard to stop a poor habit but there is help out there. Please feel free to attend any of the following support groups to help you kick the habit or go to [facility] to be evaluated by the psychiatric team. Never give up!! AlAnon meetings: [facility] MONDAY 10:30 AM [place] [address] SATURDAY 8:00 PM [facility] SATURDAY NIGHT [address]         ANIBAL BITS:  Disposing medicines in household trash: Almost all medicines can be thrown into your household trash. These include prescription and over-the-counter (OTC) drugs in pills, liquids, drops, patches, creams, and inhalers. Follow these steps:  1) Remove the drugs from their original containers and mix them with something undesirable, such as used coffee grounds, dirt, or cat litter.  This makes the medicine less appealing to children and pets and unrecognizable to someone who might intentionally go through the trash looking for drugs. 2) Put the mixture in something you can close (a re-sealable zipper storage bag, empty can, or other container) to prevent the drug from leaking or spilling out. 3) Throw the container in the garbage. 4) Scratch out all your personal information on the empty medicine packaging to protect your identity and privacy. Throw the packaging away. If you have a question about your medicine, ask your health care provider or pharmacist.      Fluoxetine (By mouth)   Fluoxetine (samt-LU-[id number]-teen)  Treats depression, obsessive-compulsive disorder (OCD), bulimia nervosa, and panic disorder. This medicine is an SSRI. Brand Name(s): FLUoxetine HCl, PROzac, PROzac Weekly, Sarafem   There may be other brand names for this medicine. When This Medicine Should Not Be Used: This medicine is not right for everyone. Do not use it if you had an allergic reaction to fluoxetine. How to Use This Medicine:   Capsule, Delayed Release Capsule, Liquid, Tablet  · Take your medicine as directed. Your dose may need to be changed several times to find what works best for you. Take your medicine at the same time each day. · You may need to take this medicine for a month or longer before you feel better. If you feel that the medicine is not working well, tell your doctor right away. Do not take more than your normal dose. · Measure the oral liquid medicine with a marked measuring spoon, oral syringe, or medicine cup. · Delayed-release capsule: Swallow whole. Do not crush, break, or chew it. · This medicine should come with a Medication Guide. Ask your pharmacist for a copy if you do not have one. · Missed dose:   ¨ Every day dose (Prozac® or Sarafem®): If you miss a dose or forget to take your medicine, take it as soon as you can. If it is almost time for your next dose, wait until then to take the medicine and skip the missed dose.  Do not use extra medicine to make up for a missed dose.  ¨ Once-a-week dose [facility]): If you miss a dose or forget to take your medicine, take it as soon as you can. Then go back to your regular schedule the next week. Do not use extra medicine to make up for a missed dose. · Store the medicine in a closed container at room temperature, away from heat, moisture, and direct light. Drugs and Foods to Avoid:   Ask your doctor or pharmacist before using any other medicine, including over-the-counter medicines, vitamins, and herbal products. · Do not use this medicine together with pimozide or thioridazine. Do not use this medicine within 14 days of using an MAO inhibitor, and do not start an MAOI for at least 5 weeks after you stop using fluoxetine. · Some medicines can affect how fluoxetine works. Tell your doctor if you are using any of the following:  ¨ Buspirone, carbamazepine, dolasetron, erythromycin, fentanyl, gatifloxacin, lithium, mefloquine, methadone, moxifloxacin, pentamidine, phenytoin, probucol, Cristobal's wort, tacrolimus, tramadol, tryptophan supplement, or vinblastine  ¨ Amphetamines  ¨ Blood thinner (including warfarin)  ¨ Diuretic (water pill)  ¨ Medicine for heart rhythm problem  ¨ Medicine to treat mental illness (including chlorpromazine, droperidol, iloperidone, ziprasidone)  ¨ NSAID pain or arthritis medicine (including aspirin, celecoxib, diclofenac, ibuprofen, naproxen)  ¨ Phenothiazine medicine  ¨ Triptan medicine to treat migraine headache  · Do not drink alcohol while you are using this medicine. · Tell your doctor if you use anything else that makes you sleepy. Some examples are allergy medicine, narcotic pain medicine, and alcohol. Warnings While Using This Medicine:   · Tell your doctor if you are pregnant or breastfeeding, or if you have kidney disease, liver disease, bleeding problems, diabetes, glaucoma, or a history of seizures.  Tell your doctor if you have had heart disease, a heart rhythm problem (such as QT prolongation), heart attack, heart failure, low blood pressure, or a stroke. · For some children, teenagers, and young adults, this medicine may increase mental or emotional problems. This may lead to thoughts of suicide and violence. Talk with your doctor right away if you have any thoughts or behavior changes that concern you. Tell your doctor if you or anyone in your family has a history of bipolar disorder or suicide attempts. · This medicine may cause the following problems:  ¨ Serotonin syndrome (may be life-threatening when used with certain other medicines)  ¨ Higher risk of bleeding  ¨ Low sodium levels in the blood  ¨ Heart rhythm changes  · Do not stop using this medicine suddenly. Your doctor will need to slowly decrease your dose before you stop it completely. · This medicine may make you dizzy or drowsy. Do not drive or do anything else that could be dangerous until you know how this medicine affects you. · Keep all medicine out of the reach of children. Never share your medicine with anyone.   Possible Side Effects While Using This Medicine:   Call your doctor right away if you notice any of these side effects:  · Allergic reaction: Itching or hives, swelling in your face or hands, swelling or tingling in your mouth or throat, chest tightness, trouble breathing  · Anxiety, restlessness, fever, sweating, muscle spasms, nausea, vomiting, diarrhea, seeing or hearing things that are not there  · Confusion, weakness, and muscle twitching  · Eye pain, trouble seeing, blurry vision  · Fast, pounding, or uneven heartbeat, dizziness  · Seizures  · Skin rash, blisters, peeling, or redness  · Trouble breathing  · Unusual behavior, thoughts of hurting yourself or others, feeling more excited or energetic than usual, trouble sleeping  · Unusual bleeding or bruising  If you notice these less serious side effects, talk with your doctor:   · Diarrhea, changes in appetite, weight gain or loss  · Dry mouth  · Sleepiness or unusual drowsiness, unusual dreams  · Sexual problems  If you notice other side effects that you think are caused by this medicine, tell your doctor. Call your doctor for medical advice about side effects. You may report side effects to FDA at 3-084-WDV-4948  © 2017 [facility] Information is for End User's use only and may not be sold, redistributed or otherwise used for commercial purposes. The above information is an  only. It is not intended as medical advice for individual conditions or treatments. Talk to your doctor, nurse or pharmacist before following any medical regimen to see if it is safe and effective for you.

## 2019-02-05 NOTE — LETTER
2/5/2019 [facility] [address], [address] [facility], 1980, is picking up the following medications ordered from the [facility] Program: STOCK:  PROZAC 20 MG #30 Farhana Garcia Patient's Signature: _____________________________ Today's Date: 2/5/2019

## 2019-02-06 ENCOUNTER — TELEPHONE (OUTPATIENT)
Dept: FAMILY MEDICINE CLINIC | Age: 39
End: 2019-02-06

## 2019-02-07 NOTE — TELEPHONE ENCOUNTER
Medication: Advair 250/50 mcg, dose: 1 inhalation, how often: twice daily, current number of medication days provided: 90, refill per application. Lot #: [id number], EXP 02/2020   This medication was received and verified for the following 1. Correct Patient, 2. Correct Diagnosis, 3. Correct Drug, 4. Correct route, and no current allergy to medication.

## 2019-03-07 ENCOUNTER — TELEPHONE (OUTPATIENT)
Dept: FAMILY MEDICINE CLINIC | Age: 39
End: 2019-03-07

## 2019-03-19 NOTE — TELEPHONE ENCOUNTER
Medication: Prozac 20mg , dose: 1 cap, how often: qd , current number of medication days provided: 90, refill per application. Lot #: Lot #[id number], EXP 04/2021. This medication was received and verified for the following 1. Correct Patient, 2. Correct Diagnosis, 3. Correct Drug, 4. Correct route, and no current allergy to medication.        Luis Sexton [facility]

## 2019-03-20 ENCOUNTER — TELEPHONE (OUTPATIENT)
Dept: FAMILY MEDICINE CLINIC | Age: 39
End: 2019-03-20

## 2019-03-20 NOTE — TELEPHONE ENCOUNTER
Patient now has [facility] and would like a referral to see a podiatrist. Can you please refer him out. Also pt wanted you to know that he will not be taking Proszac so he stopped taking it a couple of weeks ago.

## 2019-04-16 ENCOUNTER — OFFICE VISIT (OUTPATIENT)
Dept: FAMILY MEDICINE CLINIC | Age: 39
End: 2019-04-16

## 2019-04-16 VITALS
OXYGEN SATURATION: 97 % | HEART RATE: 77 BPM | DIASTOLIC BLOOD PRESSURE: 75 MMHG | RESPIRATION RATE: 16 BRPM | SYSTOLIC BLOOD PRESSURE: 120 MMHG | TEMPERATURE: 98.6 F | BODY MASS INDEX: 20.5 KG/M2 | WEIGHT: 146.4 LBS | HEIGHT: 71 IN

## 2019-04-16 DIAGNOSIS — B07.0 PLANTAR WARTS: Primary | ICD-10-CM

## 2019-04-16 DIAGNOSIS — J30.89 ENVIRONMENTAL AND SEASONAL ALLERGIES: ICD-10-CM

## 2019-04-16 DIAGNOSIS — J44.9 CHRONIC OBSTRUCTIVE PULMONARY DISEASE, UNSPECIFIED COPD TYPE (HCC): ICD-10-CM

## 2019-04-16 RX ORDER — IPRATROPIUM BROMIDE AND ALBUTEROL SULFATE 2.5; .5 MG/3ML; MG/3ML
3 SOLUTION RESPIRATORY (INHALATION)
Qty: 30 NEBULE | Refills: 6 | Status: SHIPPED | OUTPATIENT
Start: 2019-04-16 | End: 2021-10-07 | Stop reason: SDUPTHER

## 2019-04-16 NOTE — PROGRESS NOTES
Chief Complaint   Patient presents with    Referral Follow Up     Pt comes in today seeking a podiatry referral for plantar warts. Pt states he has had these warts for years and he has to dig them out to find any pain relief. Pt is a  and is on his feet all day and these warts increase his foot pains. Pt states his allergies took over a few weeks ago where he was having to use the albuterol inhaler more than prescribed along with the Advair. Pt has requested albuterol nebules. Pt continues to smoke cigarettes. Visit Vitals  /75   Pulse 77   Temp 98.6 °F (37 °C)   Resp 16   Ht 5' 11\" (1.803 m)   Wt 146 lb 6.4 oz (66.4 kg)   SpO2 97%   BMI 20.42 kg/m²     Physical Exam   Constitutional: He is oriented to person, place, and time. Slim build   HENT:   Head: Atraumatic. Eyes: EOM are normal.   Neck: Neck supple. Cardiovascular: Normal rate. Pulmonary/Chest: Effort normal.   Musculoskeletal: Normal range of motion. Feet:    Plantar warts   Neurological: He is alert and oriented to person, place, and time. Skin: Skin is warm and dry. Psychiatric: He has a normal mood and affect. His behavior is normal. Judgment and thought content normal.     Current Outpatient Medications on File Prior to Visit   Medication Sig Dispense Refill    fluticasone-salmeterol (ADVAIR) 250-50 mcg/dose diskus inhaler Take 1 Puff by inhalation every twelve (12) hours. For COPD 3 Inhaler 3    albuterol (PROVENTIL HFA, VENTOLIN HFA, PROAIR HFA) 90 mcg/actuation inhaler Take 2 Puffs by inhalation every six (6) hours as needed for Wheezing or Shortness of Breath. 3 Inhaler 3    ibuprofen (MOTRIN) 600 mg tablet Take 1 Tab by mouth every six (6) hours as needed for Pain. 20 Tab 0    FLUoxetine (PROZAC) 20 mg capsule Take 1 Cap by mouth daily. For anxiety/depression 30 Cap 0    FLUoxetine (PROZAC) 20 mg capsule Take 1 Cap by mouth daily.  For anxiety/depression 90 Cap 3     No current facility-administered medications on file prior to visit. 1. Plantar warts  Informed pt that he rios a hx of no showing his appts and if he fails to keep this appt or call and cancel appt in advance, pt will not receive another referral. Pt verb understanding    - REFERRAL TO PODIATRY    2. Chronic obstructive pulmonary disease, unspecified COPD type ([facility] [address]    - albuterol-ipratropium (DUO-NEB) 2.5 mg-0.5 mg/3 ml nebu; 3 mL by Nebulization route every six (6) hours as needed (shortness of breath). Indications: Bronchi Muscle Spasm resulting from COPD  Dispense: 30 Nebule; Refill: 6    3. Environmental and seasonal allergies    - albuterol-ipratropium (DUO-NEB) 2.5 mg-0.5 mg/3 ml nebu; 3 mL by Nebulization route every six (6) hours as needed (shortness of breath). Indications: Bronchi Muscle Spasm resulting from COPD  Dispense: 30 Nebule;  Refill: 6    Face to face time: 20 min

## 2019-04-16 NOTE — PATIENT INSTRUCTIONS
Plantar Warts: Care Instructions  Your Care Instructions  A plantar wart is a harmless skin growth. Plantar warts occur on the bottom of your feet and may be painful when you walk. A virus makes the top layer of skin grow quickly, causing a wart. Warts usually go away on their own in months or years. Warts are spread easily. You can infect yourself again by touching the wart and then touching another part of your body. You also can infect others by sharing towels, razors, or other personal items. Most plantar warts do not need treatment. But if warts cause you pain or spread, your doctor may recommend that you use an over-the-counter treatment. These include salicylic acid or duct tape. Your doctor may prescribe a stronger medicine to put on warts or may inject them with medicine. Your doctor also can remove warts through surgery or by freezing them. Follow-up care is a key part of your treatment and safety. Be sure to make and go to all appointments, and call your doctor if you are having problems. It's also a good idea to know your test results and keep a list of the medicines you take. How can you care for yourself at home? · Use salicylic acid or duct tape as your doctor directs. You put the medicine or the tape on a wart for a while and then file down the dead skin on the wart. You use the salicylic acid treatment for 2 to 3 months or the tape for 1 to 2 months. · If your doctor prescribes medicine to put on warts, use it exactly as prescribed. Call your doctor if you think you are having a problem with your medicine. · Wear comfortable shoes and socks. Avoid high heels or shoes that put a lot of pressure on your foot. · Pad the wart with doughnut-shaped felt or a moleskin patch. You can buy these at a drugstore. Put the pad around the plantar wart so that it relieves pressure on the wart. You also can place pads or cushions in your shoes to make walking more comfortable.   · Take an over-the-counter medicine, such as acetaminophen (Tylenol), ibuprofen (Advil, Motrin), or naproxen (Aleve) if you have pain. Read and follow all instructions on the label. · Do not take two or more pain medicines at the same time unless the doctor told you to. Many pain medicines have acetaminophen, which is Tylenol. Too much acetaminophen (Tylenol) can be harmful. When should you call for help? Call your doctor now or seek immediate medical care if:    · You have signs of infection, such as:  ? Increased pain, swelling, warmth, or redness. ? Red streaks leading from a wart. ? Pus draining from a wart. ? A fever.    Watch closely for changes in your health, and be sure to contact your doctor if:    · You do not get better as expected. Where can you learn more? Go to http://neptali-abhinav.info/. Enter S429 in the search box to learn more about \"Plantar Warts: Care Instructions. \"  Current as of: April 17, 2018  Content Version: 11.9  © 3665-9696 [employer]. Care instructions adapted under license by [employer] (which disclaims liability or warranty for this information). If you have questions about a medical condition or this instruction, always ask your healthcare professional. [address] any warranty or liability for your use of this information.

## 2019-04-16 NOTE — PROGRESS NOTES
Patient want to see a podiatrist with plantar warts that are painful and big on b/l feet.   Patient would like a referral.

## 2019-04-22 ENCOUNTER — TELEPHONE (OUTPATIENT)
Dept: FAMILY MEDICINE CLINIC | Age: 39
End: 2019-04-22

## 2019-04-22 NOTE — TELEPHONE ENCOUNTER
Genice, please call patient and make them aware that the nursing staff will call with a date and time of appt.  Thank you

## 2019-04-22 NOTE — TELEPHONE ENCOUNTER
Patient's mother called about the referral for her son to go to the podiatrist.  Please call with the information. Thank you.

## 2019-05-28 DIAGNOSIS — J44.9 CHRONIC OBSTRUCTIVE PULMONARY DISEASE, UNSPECIFIED COPD TYPE (HCC): ICD-10-CM

## 2019-05-28 NOTE — TELEPHONE ENCOUNTER
Patient called requesting refill of albuterol inhaler to be sent to [facility]. He states that he has the nebulizer solution but just needs the inhaler. Order for inhaler forwarded to provider, MARIELOS Pickett DNP, for refill.

## 2019-05-30 ENCOUNTER — HOSPITAL ENCOUNTER (OUTPATIENT)
Dept: LAB | Age: 39
Discharge: HOME | End: 2019-05-30

## 2019-05-30 LAB — SENTARA SPECIMEN COL,SENBCF: NORMAL

## 2019-05-30 PROCEDURE — 99001 SPECIMEN HANDLING PT-LAB: CPT

## 2019-05-30 RX ORDER — ALBUTEROL SULFATE 90 UG/1
2 AEROSOL, METERED RESPIRATORY (INHALATION)
Qty: 1 INHALER | Refills: 0 | Status: SHIPPED | OUTPATIENT
Start: 2019-05-30 | End: 2019-06-04 | Stop reason: ALTCHOICE

## 2019-05-30 NOTE — TELEPHONE ENCOUNTER
Requested Prescriptions     Signed Prescriptions Disp Refills    albuterol (PROVENTIL HFA, VENTOLIN HFA, PROAIR HFA) 90 mcg/actuation inhaler 1 Inhaler 0     Sig: Take 2 Puffs by inhalation every six (6) hours as needed for Wheezing or Shortness of Breath.      Authorizing Provider: Giovanni Schwab     Pt has f/u appt 6/4/19

## 2019-06-02 ENCOUNTER — HOSPITAL ENCOUNTER (EMERGENCY)
Age: 39
Discharge: HOME | End: 2019-06-02
Attending: EMERGENCY MEDICINE
Payer: MEDICAID

## 2019-06-02 VITALS
SYSTOLIC BLOOD PRESSURE: 122 MMHG | DIASTOLIC BLOOD PRESSURE: 84 MMHG | HEART RATE: 70 BPM | RESPIRATION RATE: 16 BRPM | OXYGEN SATURATION: 99 % | TEMPERATURE: 98.3 F

## 2019-06-02 DIAGNOSIS — J02.9 PHARYNGITIS, UNSPECIFIED ETIOLOGY: ICD-10-CM

## 2019-06-02 DIAGNOSIS — B37.0 ORAL THRUSH: Primary | ICD-10-CM

## 2019-06-02 DIAGNOSIS — R59.1 LYMPHADENOPATHY OF HEAD AND NECK: ICD-10-CM

## 2019-06-02 PROCEDURE — 87081 CULTURE SCREEN ONLY: CPT

## 2019-06-02 PROCEDURE — 99282 EMERGENCY DEPT VISIT SF MDM: CPT

## 2019-06-02 RX ORDER — LIDOCAINE HYDROCHLORIDE 20 MG/ML
SOLUTION OROPHARYNGEAL
Qty: 60 ML | Refills: 0 | Status: SHIPPED | OUTPATIENT
Start: 2019-06-02 | End: 2019-09-09

## 2019-06-02 RX ORDER — NYSTATIN 100000 [USP'U]/ML
1 SUSPENSION ORAL 4 TIMES DAILY
Qty: 60 ML | Refills: 0 | Status: SHIPPED | OUTPATIENT
Start: 2019-06-02 | End: 2019-09-09

## 2019-06-02 NOTE — ED PROVIDER NOTES
EMERGENCY DEPARTMENT HISTORY AND PHYSICAL EXAM    Date: 6/2/2019  Patient Name: Andrey Leo    History of Presenting Illness     Chief Complaint   Patient presents with    Sore Throat         History Provided By: Patient    Chief Complaint: Sore throat  Duration: 2 Weeks  Timing:  Constant  Location: Throat  Quality: Aching  Severity: 8 out of 10  Modifying Factors: No relief with tylenol or motrin  Associated Symptoms: swollen glands bilaterally    Additional History (Context):   Andrey Leo is a 45 y.o. male with PMHX of asthma, COPD, noncompliance who presents to the emergency department C/O 2 weeks of sore throat and swollen lymph nodes. That started after switching his inhaler from [employer] to Ventolin. Slight cough but nothing new for him. Sometimes itchy ears. Not on a daily anti-histamine because they give him a headache. Pt denies itchy eyes, sneezing, fevers, nasal congestion, vomiting, diarrhea, chest pain, shortness of breath, and any other sxs or complaints. PCP: Krystina Nicole NP    Current Outpatient Medications   Medication Sig Dispense Refill    nystatin (MYCOSTATIN) 100,000 unit/mL suspension Take 5 mL by mouth four (4) times daily. swish and spit 60 mL 0    lidocaine (LIDOCAINE VISCOUS) 2 % solution Put 10 mL in mouth and swish and spit out QAC and at bedtime 60 mL 0    albuterol (PROVENTIL HFA, VENTOLIN HFA, PROAIR HFA) 90 mcg/actuation inhaler Take 2 Puffs by inhalation every six (6) hours as needed for Wheezing or Shortness of Breath. 1 Inhaler 0    albuterol-ipratropium (DUO-NEB) 2.5 mg-0.5 mg/3 ml nebu 3 mL by Nebulization route every six (6) hours as needed (shortness of breath). Indications: Bronchi Muscle Spasm resulting from COPD 30 Nebule 6    FLUoxetine (PROZAC) 20 mg capsule Take 1 Cap by mouth daily. For anxiety/depression 90 Cap 3    fluticasone-salmeterol (ADVAIR) 250-50 mcg/dose diskus inhaler Take 1 Puff by inhalation every twelve (12) hours.  For COPD 3 Inhaler 3    ibuprofen (MOTRIN) 600 mg tablet Take 1 Tab by mouth every six (6) hours as needed for Pain. 20 Tab 0       Past History     Past Medical History:  Past Medical History:   Diagnosis Date    Asthma 1986    Chronic back pain     Chronic neck pain     Chronic obstructive pulmonary disease ([facility] [address] 6/26/2018    Cluster headache     COPD (chronic obstructive pulmonary disease) ([facility] [address] 01/09/2017    Mild Obstructive airway disease. See PFT results    Current smoker     Dental caries     Hypertriglyceridemia     Noncompliance with medication regimen 12/19/2016    Overuse of medication 12/19/2016    Scoliosis     Scoliosis of thoracic spine 12/19/2016    Shortness of breath 12/19/2016       Past Surgical History:  No past surgical history on file. Family History:  Family History   Problem Relation Age of Onset    Heart Attack Father     Hypertension Father     Migraines Father        Social History:  Social History     Tobacco Use    Smoking status: Current Every Day Smoker     Packs/day: 0.80     Types: Cigarettes    Smokeless tobacco: Never Used   Substance Use Topics    Alcohol use: Yes    Drug use: No     Types: Marijuana       Allergies:  No Known Allergies      Review of Systems   Review of Systems   Constitutional: Negative for fever. HENT: Positive for sore throat. Negative for congestion and rhinorrhea.         + ear itching   Respiratory: Negative for shortness of breath. Cardiovascular: Negative for chest pain. Gastrointestinal: Negative for diarrhea and vomiting. All other systems reviewed and are negative. Physical Exam     Vitals:    06/02/19 1234   BP: 122/84   Pulse: 70   Resp: 16   Temp: 98.3 °F (36.8 °C)   SpO2: 99%     Physical Exam   Constitutional: He appears well-developed and well-nourished. No distress. HENT:   Head: Normocephalic and atraumatic.    Right Ear: External ear normal.   Left Ear: External ear normal.   Nose: Nose normal.   Mouth/Throat: No oropharyngeal exudate. White plaques noted to the posterior oropharynx and uvula with posterior pharynx erythema. No tonsillar hypertrophy or exudates noted. Eyes: Conjunctivae are normal.   Neck: Normal range of motion. Cardiovascular: Normal rate, regular rhythm and normal heart sounds. Pulmonary/Chest: Effort normal and breath sounds normal. No respiratory distress. He has no wheezes. Lymphadenopathy:     He has cervical adenopathy ( bilateral anterior cervical lymphadenopathy). Neurological: He is alert. Skin: Skin is warm and dry. He is not diaphoretic. Psychiatric: He has a normal mood and affect. Nursing note and vitals reviewed. Diagnostic Study Results     Labs -     Recent Results (from the past 12 hour(s))   STREP THROAT SCREEN    Collection Time: 06/02/19 12:35 PM   Result Value Ref Range    Special Requests: NO SPECIAL REQUESTS      Strep Screen NEGATIVE       Culture result: PENDING        Radiologic Studies -   No orders to display     CT Results  (Last 48 hours)    None        CXR Results  (Last 48 hours)    None          Medications given in the ED-  Medications - No data to display      Medical Decision Making   I am the first provider for this patient. I reviewed the vital signs, available nursing notes, past medical history, past surgical history, family history and social history. Vital Signs-Reviewed the patient's vital signs. Records Reviewed: Nursing Notes    Provider Notes (Medical Decision Making): Appears well hydrated and non toxic, with stable vital signs, presenting with 2 weeks of sore throat and swollen lymph nodes. States that symptoms started after he switched from pro-air to Ventolin inhaler due to insurance switch. Exam c/w oral thrush, will tx with nystatin. I also feel the patient can benefit from daily antihistamine however he states that his insurance will only cover certain types so he will defer this to his PCP on Wednesday.  Based on today's assessment, I feel the patient is stable for discharge to home with outpatient follow up. Return precautions and referrals provided. Diagnosis and Disposition       DISCHARGE NOTE:  1:29 PM   Santhosh Hammond's  results have been reviewed with him. He has been counseled regarding his diagnosis. He verbally conveys understanding and agreement of the signs, symptoms, diagnosis, treatment and prognosis and additionally agrees to follow up as recommended with Dr. Alfred Markham NP in 24 - 48 hours. He also agrees with the care-plan and conveys that all of his questions have been answered. I have also put together some discharge instructions for him that include: 1) educational information regarding their diagnosis, 2) how to care for their diagnosis at home, as well a 3) list of reasons why they would want to return to the ED prior to their follow-up appointment, should their condition change. CLINICAL IMPRESSION:    1. Oral thrush    2. Pharyngitis, unspecified etiology    3. Lymphadenopathy of head and neck        PLAN:  1. D/C Home  2. Current Discharge Medication List      START taking these medications    Details   nystatin (MYCOSTATIN) 100,000 unit/mL suspension Take 5 mL by mouth four (4) times daily. swish and spit  Qty: 60 mL, Refills: 0      lidocaine (LIDOCAINE VISCOUS) 2 % solution Put 10 mL in mouth and swish and spit out QAC and at bedtime  Qty: 60 mL, Refills: 0           3. Follow-up Information     Follow up With Specialties Details Why Contact Info    Alfred Markham NP Nurse Practitioner On 6/5/2019  [address] [place] [address] [address] [facility] EMERGENCY DEPT Emergency Medicine  As needed, If symptoms worsen [address] [place] [id number]  [phone]            Dictation disclaimer:  Please note that this dictation was completed with [employer], the [employer] voice recognition software.   Quite often unanticipated grammatical, syntax, homophones, and other interpretive errors are inadvertently transcribed by the computer software. Please disregard these errors. Please excuse any errors that have escaped final proofreading.

## 2019-06-02 NOTE — ED NOTES
Pt states his throat has been sore for a few days, glands have been swollen for \"a few weeks\". States he usually \"takes Proventil and Ventolin for asthma but since my medicaid got cut they've been giving me Proair which hasn't done anything. I looked up side effects and one of the side effects is sore throat. \"

## 2019-06-02 NOTE — DISCHARGE INSTRUCTIONS
Nystatin swish and spit - try to gargle with it to reach the back of the throat. Lidocaine swish and spit for pain. Tylenol and Motrin for pain. Follow up with the [facility] Wednesday as scheduled and discuss if you should be on a daily anti-histamine and asthma medications. Candidiasis: Care Instructions  Your Care Instructions  Candidiasis (say \"hex-ukm-ZW-uh-eric\") is a yeast infection. Yeast normally lives in your body. But it can cause problems if your body's defenses don't work as they should. Some medicines can increase your chance of getting a yeast infection. These include antibiotics, steroids, and cancer drugs. And some diseases like AIDS and diabetes can make you more likely to get yeast infections. There are different types of yeast infections. Farzaneh Apt is a yeast infection in the mouth. It usually occurs in people with weak immune systems. It causes white patches inside the mouth and throat. Yeast infections of the skin usually occur in skin folds where the skin stays moist. They cause red, oozing patches on your skin. Babies can get these infections under the diaper. People who often wear gloves can get them on their hands. Many women get vaginal yeast infections. They are most common when women take antibiotics. These infections can cause the vagina to itch and burn. They also cause white discharge that looks like cottage cheese. In rare cases, yeast infects the blood. This can cause serious disease. This kind of infection is treated with medicine given through a needle into a vein (IV). After you start treatment, a yeast infection usually goes away quickly. But if your immune system is weak, the infection may come back. Tell your doctor if you get yeast infections often. Follow-up care is a key part of your treatment and safety. Be sure to make and go to all appointments, and call your doctor if you are having problems.  It's also a good idea to know your test results and keep a list of the medicines you take. How can you care for yourself at home? · Take your medicines exactly as prescribed. Call your doctor if you think you are having a problem with your medicine. · Use antibiotics only as directed by your doctor. · Eat yogurt with live cultures. It has bacteria called lactobacillus. It may help prevent some types of yeast infections. · Keep your skin clean and dry. Put powder on moist places. · If you are using a cream or suppository to treat a vaginal yeast infection, don't use condoms or a diaphragm. Use a different type of birth control. · Eat a healthy diet and get regular exercise. This will help keep your immune system strong. When should you call for help? Watch closely for changes in your health, and be sure to contact your doctor if:    · You do not get better as expected. Where can you learn more? Go to http://neptali-abhinav.info/. Enter D803 in the search box to learn more about \"Candidiasis: Care Instructions. \"  Current as of: May 14, 2018  Content Version: 11.9  © 1194-0097 [employer]. Care instructions adapted under license by [employer] (which disclaims liability or warranty for this information). If you have questions about a medical condition or this instruction, always ask your healthcare professional. [address] any warranty or liability for your use of this information.

## 2019-06-03 ENCOUNTER — TELEPHONE (OUTPATIENT)
Dept: FAMILY MEDICINE CLINIC | Age: 39
End: 2019-06-03

## 2019-06-03 NOTE — TELEPHONE ENCOUNTER
Called patient to remind him of his appointment tomorrow afternoon. Patient phone not working at this time.

## 2019-06-04 ENCOUNTER — OFFICE VISIT (OUTPATIENT)
Dept: FAMILY MEDICINE CLINIC | Age: 39
End: 2019-06-04

## 2019-06-04 VITALS
OXYGEN SATURATION: 97 % | HEART RATE: 82 BPM | RESPIRATION RATE: 20 BRPM | WEIGHT: 145.4 LBS | TEMPERATURE: 98.9 F | SYSTOLIC BLOOD PRESSURE: 122 MMHG | DIASTOLIC BLOOD PRESSURE: 80 MMHG | BODY MASS INDEX: 20.35 KG/M2 | HEIGHT: 71 IN

## 2019-06-04 DIAGNOSIS — F90.9 HYPERACTIVE: ICD-10-CM

## 2019-06-04 DIAGNOSIS — Z91.09 ENVIRONMENTAL ALLERGIES: Chronic | ICD-10-CM

## 2019-06-04 DIAGNOSIS — F17.200 CURRENT SMOKER: ICD-10-CM

## 2019-06-04 DIAGNOSIS — K21.9 GASTROESOPHAGEAL REFLUX DISEASE WITHOUT ESOPHAGITIS: ICD-10-CM

## 2019-06-04 DIAGNOSIS — J44.9 CHRONIC OBSTRUCTIVE PULMONARY DISEASE, UNSPECIFIED COPD TYPE (HCC): Primary | ICD-10-CM

## 2019-06-04 DIAGNOSIS — Z91.89 COMPLIANCE WITH MEDICATION REGIMEN: ICD-10-CM

## 2019-06-04 LAB
B-HEM STREP THROAT QL CULT: NEGATIVE
BACTERIA SPEC CULT: NORMAL
SERVICE CMNT-IMP: NORMAL

## 2019-06-04 RX ORDER — FLUTICASONE PROPIONATE AND SALMETEROL 250; 50 UG/1; UG/1
1 POWDER RESPIRATORY (INHALATION) EVERY 12 HOURS
Qty: 3 INHALER | Refills: 3 | Status: SHIPPED | OUTPATIENT
Start: 2019-06-04 | End: 2019-09-09 | Stop reason: DRUGHIGH

## 2019-06-04 RX ORDER — LORATADINE 10 MG/1
10 TABLET ORAL
Qty: 30 TAB | Refills: 6 | Status: SHIPPED | OUTPATIENT
Start: 2019-06-04

## 2019-06-04 RX ORDER — ALBUTEROL SULFATE 90 UG/1
2 AEROSOL, METERED RESPIRATORY (INHALATION)
Qty: 2 INHALER | Refills: 2 | Status: SHIPPED | OUTPATIENT
Start: 2019-06-04 | End: 2019-09-05 | Stop reason: SDUPTHER

## 2019-06-04 RX ORDER — OMEPRAZOLE 20 MG/1
20 CAPSULE, DELAYED RELEASE ORAL
Qty: 30 CAP | Refills: 3 | Status: SHIPPED | OUTPATIENT
Start: 2019-06-04 | End: 2019-09-05 | Stop reason: SDUPTHER

## 2019-06-04 RX ORDER — FLUTICASONE PROPIONATE 50 MCG
2 SPRAY, SUSPENSION (ML) NASAL
Qty: 1 BOTTLE | Refills: 3 | Status: SHIPPED | OUTPATIENT
Start: 2019-06-04

## 2019-06-04 RX ORDER — ALBUTEROL SULFATE 90 UG/1
2 AEROSOL, METERED RESPIRATORY (INHALATION)
Qty: 1 INHALER | Refills: 3 | Status: SHIPPED | OUTPATIENT
Start: 2019-06-04 | End: 2019-06-04 | Stop reason: SDUPTHER

## 2019-06-04 NOTE — PROGRESS NOTES
[facility]  [facility], [address] [place] [address]  [phone] [facility]/[phone] fax      6/4/2019    Reason for visit:   Chief Complaint   Patient presents with    Follow Up Chronic Condition       Patient: Zenaida Munoz, 1980, [id number]       Primary MD: Fito Mccarthy NP    Subjective:   Zenaida Munoz, a 45 y.o. male, who presents for Follow Up Chronic Condition      Past Medical History:   Diagnosis Date    Asthma 1986    Chronic back pain     Chronic neck pain     Chronic obstructive pulmonary disease ([facility] 6/26/2018    Cluster headache     COPD (chronic obstructive pulmonary disease) ([facility] 01/09/2017    Mild Obstructive airway disease. See PFT results    Current smoker     Dental caries     Hypertriglyceridemia     Noncompliance with medication regimen 12/19/2016    Overuse of medication 12/19/2016    Scoliosis     Scoliosis of thoracic spine 12/19/2016    Shortness of breath 12/19/2016       No past surgical history on file. Social History     Socioeconomic History    Marital status: SINGLE     Spouse name: Not on file    Number of children: 0    Years of education: Not on file    Highest education level: Not on file   Occupational History    Not on file   Social Needs    Financial resource strain: Not on file    Food insecurity:     Worry: Not on file     Inability: Not on file    Transportation needs:     Medical: Not on file     Non-medical: Not on file   Tobacco Use    Smoking status: Current Every Day Smoker     Packs/day: 0.80     Types: Cigarettes    Smokeless tobacco: Never Used   Substance and Sexual Activity    Alcohol use:  Yes    Drug use: No     Types: Marijuana    Sexual activity: Not on file   Lifestyle    Physical activity:     Days per week: Not on file     Minutes per session: Not on file    Stress: Not on file   Relationships    Social connections:     Talks on phone: Not on file     Gets together: Not on file     Attends Denominational service: Not on file     Active member of club or organization: Not on file     Attends meetings of clubs or organizations: Not on file     Relationship status: Not on file    Intimate partner violence:     Fear of current or ex partner: Not on file     Emotionally abused: Not on file     Physically abused: Not on file     Forced sexual activity: Not on file   Other Topics Concern     Service No    Blood Transfusions No    Caffeine Concern No    Occupational Exposure No    Hobby Hazards No    Sleep Concern Yes    Stress Concern No    Weight Concern No    Special Diet No    Back Care Yes     Comment: scoliosis    Exercise Yes    Bike Helmet No    Seat Belt Yes    Self-Exams No   Social History Narrative    Not on file       No Known Allergies    Current Outpatient Medications on File Prior to Visit   Medication Sig Dispense Refill    nystatin (MYCOSTATIN) 100,000 unit/mL suspension Take 5 mL by mouth four (4) times daily. swish and spit 60 mL 0    lidocaine (LIDOCAINE VISCOUS) 2 % solution Put 10 mL in mouth and swish and spit out QAC and at bedtime 60 mL 0    albuterol-ipratropium (DUO-NEB) 2.5 mg-0.5 mg/3 ml nebu 3 mL by Nebulization route every six (6) hours as needed (shortness of breath). Indications: Bronchi Muscle Spasm resulting from COPD 30 Nebule 6    ibuprofen (MOTRIN) 600 mg tablet Take 1 Tab by mouth every six (6) hours as needed for Pain. 20 Tab 0     No current facility-administered medications on file prior to visit. Review of Systems   Constitutional: Negative. HENT: Positive for sore throat. I went to ED 2 days ago for sore throat and they treated me for thrush. My throat is still sore when I swallow. Eyes: Negative. Respiratory:        My insurance wont cover the ventolin, the proair dont work. I dont smoke everyday. I have not smoked in 3 days. If I have a beer I will want to smoke. Cardiovascular: Negative. Gastrointestinal:        I had heartburn two nights ago after eating tacos. Genitourinary: Negative. Musculoskeletal: Negative. Skin: Negative. The podiatrist is working on my warts on my feet and are doing better. Neurological: Negative. Endo/Heme/Allergies: Negative. Psychiatric/Behavioral:        After 3 days of taking prozac it made me crazy. I get depressed in the winter. Objective:   Visit Vitals  /80   Pulse 82   Temp 98.9 °F (37.2 °C)   Resp 20   Ht 5' 11\" (1.803 m)   Wt 145 lb 6.4 oz (66 kg)   SpO2 97%   BMI 20.28 kg/m²      Wt Readings from Last 3 Encounters:   06/04/19 145 lb 6.4 oz (66 kg)   04/16/19 146 lb 6.4 oz (66.4 kg)   02/05/19 146 lb (66.2 kg)     Lab Results   Component Value Date/Time    Glucose 95 03/01/2018 09:19 AM         Physical Exam   Constitutional: He is oriented to person, place, and time. Slender build   HENT:   Head: Normocephalic and atraumatic. Mouth/Throat: Posterior oropharyngeal erythema present. No oropharyngeal exudate. Neck: Normal range of motion. Neck supple. No tracheal deviation present. No thyromegaly present. Cardiovascular: Normal rate, regular rhythm and normal heart sounds. Pulmonary/Chest: Effort normal. No respiratory distress. He has wheezes. He has no rales. Musculoskeletal: Normal range of motion. Neurological: He is alert and oriented to person, place, and time. Skin: Skin is warm and dry. Psychiatric: He has a normal mood and affect. His behavior is normal. Judgment normal.   Patient was jumping all over the place during the conversation. Assessment:    Richard Willis who has risk factors including (see above previous medical hx) and:       ICD-10-CM ICD-9-CM    1.  Chronic obstructive pulmonary disease, unspecified COPD type ([facility]) J44.9 496 fluticasone propion-salmeterol (ADVAIR/WIXELA) 250-50 mcg/dose diskus inhaler      albuterol (PROAIR HFA) 90 mcg/actuation inhaler      DISCONTINUED: albuterol (PROAIR HFA) 90 mcg/actuation inhaler   2. Environmental allergies Z91.09 V15.09 loratadine (CLARITIN) 10 mg tablet      fluticasone propionate (FLONASE) 50 mcg/actuation nasal spray   3. Hyperactive F90.9 314.9    4. Gastroesophageal reflux disease without esophagitis K21.9 530.81 omeprazole (PRILOSEC) 20 mg capsule   5. Current smoker F17.200 305.1    6. Compliance with medication regimen Z91.89 V49.89      1. Chronic obstructive pulmonary disease, unspecified COPD type ([facility])  Reorder.    - fluticasone propion-salmeterol (ADVAIR/WIXELA) 250-50 mcg/dose diskus inhaler; Take 1 Puff by inhalation every twelve (12) hours. For COPD  Dispense: 3 Inhaler; Refill: 3  - albuterol (PROAIR HFA) 90 mcg/actuation inhaler; Take 2 Puffs by inhalation every six (6) hours as needed for Wheezing or Shortness of Breath. Dispense: 2 Inhaler; Refill: 2    2. Environmental allergies  Patient to start taking Claritin and Flonase. Sinus action plan! Instructed pt to: Avoid the allergen if you know what it is (strong smells, animals, cigarettes or carpet areas)    Start medications as soon as the first sneeze, runny nose, watery eye or tickle in your throat appears and plan to continue it EVERY DAY for the next 2-4 weeks. Antihistamine:   Claritin/Zyrtec/Allegra (generic, whichever med suggested) over the counter, 10 mg once a day in the morning, will not make you sleepy - to help turn off the allergic reaction in your sinus    Stop Smoking- It continues to irritate the sinus and keeps you sicker longer. A typical allergic sinus issue, AKA cold, can last for 2 weeks, if you are a smoker it can last up to 6 weeks. - loratadine (CLARITIN) 10 mg tablet; Take 1 Tab by mouth daily as needed for Allergies. Dispense: 30 Tab; Refill: 6  - fluticasone propionate (FLONASE) 50 mcg/actuation nasal spray; 2 Sprays by Both Nostrils route daily as needed for Allergies. Dispense: 1 Bottle; Refill: 3    3. Hyperactive  Ordered thy levels due to pts hyperactive behaviors. 4. Gastroesophageal reflux disease without esophagitis  Patient to start on omeprazole. - omeprazole (PRILOSEC) 20 mg capsule; Take 1 Cap by mouth daily as needed (reflux). Dispense: 30 Cap; Refill: 3    5. Current smoker  Counseled patient on the dangers of tobacco use, and was advised to quit. Reviewed strategies to maximize success, including the use of Chantix. Discussed the risks of continued tobacco use such as elevated blood pressure, vascular irritation with increased incidence of CVD with stroke or MI and PVD causing claudication, lung damage that could lead to COPD, cancer and death. Encouraged an approach to find a few healthy habits, write them down then plan to decrease their cigarette use by one each week till they are gone all together and to plan for stress that may cause them to want to restart and how to prevent it by having new coping mechanisms in place. 1-800-QUIT-NOW provided for counseling     6. Compliance with medication regimen  Discussed with patient that he needs to rinse his mouth out after every inhaler use. Patient to continue nystatin and lidocaine rinses as needed that were prescribed in ED. Written instructions followed our verbal discussion of all information discussed above, pending tests ordered and future goals/plans. Patient expressed understanding of current diagnosis, planned testing, follow up and if needed to contact the office for any questions or concerns prior to the next visit. Plan:   Med reconciliation completed with patient. Reviewed side effects of medications with the patient. Questions were answered and patient verb understanding. Discussed lab results with patient  [facility], thy levels all good. Orders Placed This Encounter    loratadine (CLARITIN) 10 mg tablet     Sig: Take 1 Tab by mouth daily as needed for Allergies.      Dispense:  30 Tab Refill:  6    omeprazole (PRILOSEC) 20 mg capsule     Sig: Take 1 Cap by mouth daily as needed (reflux). Dispense:  30 Cap     Refill:  3    DISCONTD: albuterol (PROAIR HFA) 90 mcg/actuation inhaler     Sig: Take 2 Puffs by inhalation every six (6) hours as needed for Wheezing or Shortness of Breath. Dispense:  1 Inhaler     Refill:  3    fluticasone propion-salmeterol (ADVAIR/WIXELA) 250-50 mcg/dose diskus inhaler     Sig: Take 1 Puff by inhalation every twelve (12) hours. For COPD     Dispense:  3 Inhaler     Refill:  3     Order Specific Question:   Expiration Date     Answer:   2018     Order Specific Question:   Lot#     Answer:   [id number]     Order Specific Question:        Answer:   [facility]     Order Specific Question:   NDC#     Answer:   [id number]    fluticasone propionate (FLONASE) 50 mcg/actuation nasal spray     Si Sprays by Both Nostrils route daily as needed for Allergies. Dispense:  1 Bottle     Refill:  3    albuterol (PROAIR HFA) 90 mcg/actuation inhaler     Sig: Take 2 Puffs by inhalation every six (6) hours as needed for Wheezing or Shortness of Breath. Dispense:  2 Inhaler     Refill:  2     Current Outpatient Medications   Medication Sig Dispense Refill    loratadine (CLARITIN) 10 mg tablet Take 1 Tab by mouth daily as needed for Allergies. 30 Tab 6    omeprazole (PRILOSEC) 20 mg capsule Take 1 Cap by mouth daily as needed (reflux). 30 Cap 3    fluticasone propion-salmeterol (ADVAIR/WIXELA) 250-50 mcg/dose diskus inhaler Take 1 Puff by inhalation every twelve (12) hours. For COPD 3 Inhaler 3    fluticasone propionate (FLONASE) 50 mcg/actuation nasal spray 2 Sprays by Both Nostrils route daily as needed for Allergies. 1 Bottle 3    albuterol (PROAIR HFA) 90 mcg/actuation inhaler Take 2 Puffs by inhalation every six (6) hours as needed for Wheezing or Shortness of Breath.  2 Inhaler 2    nystatin (MYCOSTATIN) 100,000 unit/mL suspension Take 5 mL by mouth four (4) times daily. swish and spit 60 mL 0    lidocaine (LIDOCAINE VISCOUS) 2 % solution Put 10 mL in mouth and swish and spit out QAC and at bedtime 60 mL 0    albuterol-ipratropium (DUO-NEB) 2.5 mg-0.5 mg/3 ml nebu 3 mL by Nebulization route every six (6) hours as needed (shortness of breath). Indications: Bronchi Muscle Spasm resulting from COPD 30 Nebule 6    ibuprofen (MOTRIN) 600 mg tablet Take 1 Tab by mouth every six (6) hours as needed for Pain. 20 Tab 0       Follow-up and Dispositions    · Return in about 4 months (around 10/4/2019) for COPD. No labs needed for 4 month follow-up appt    Jenny Urrutia. Leandro, LEXII, AGNP-C  [address] I spent 25 minutes with the patient in face-to-face consultation, of which greater than 50% was spent in counseling and coordination of care as described above.

## 2019-06-04 NOTE — PATIENT INSTRUCTIONS
[facility] reminders! Foundation Operating Hours: These may change without notice. Mon- Wed 7am to 5pm. Closed for lunch 12-1pm  Thurs 7am to 12pm  Fridays closed     Office number:     Have Insurance? ?  You can still come to the [facility]. You will not have to find a new provider!!    If you have medical insurance:    1) You will need to have all labs drawn at [facility] 1-2 weeks prior to your follow up appointment with your provider. Enter the hospital through the main entrance, walk around the circular desk and sign in. They will call your name when it is time to have your labs drawn. 2) No longer be able to obtain medications through our [facility] program. All medications will be sent to an outside pharmacy of your choice. **In case of inclement weather (snow and/or ice) please do not try to come into the office for your appointment. Please call in and you will not be counted as a \"No Show. \" SAFETY FIRST!!**    NO SHOW POLICY ~ If a patient has 3 no shows for an appointment with their Provider, Mental Health Provider, or the Nurse Navigator, they will be discharged from the practice for 6 months. Medication ordering will also be suspended. If the patient is discharged from the [facility], they can go to the [place] [address] or [address] [place] [address] where they can be seen for their primary care needs plus obtain the same type of medications as they received at the [facility]. To avoid being discharged the patient must call the office at [phone] 24 hours prior to their appointment if they need to cancel or reschedule, arrive to their appointments on time (preferrably 15 minutes early) (If you are late you will not be seen) and come to all scheduled appointments with the provider, mental health, and/or nurse navigator. If the patient is discharged from the practice, they can apply to be re-established after 6 months.      Lab work:    Unless you are instructed differently, please return to the office between the hours of 7 am and 11:00 am Monday through Thursday to have your labs drawn one to two weeks before your next scheduled PROVIDER appointment. If you do not have an appointment to follow up on these results, please make one or plan to call the office if you do not hear from us to get the results. No news does not mean good news. Medications:   Medication  times are firm:  Mondays and Tuesdays from 1pm-5pm  Wednesdays and Thursdays from 8am-11:30am  These hours are subject to change without notice. The [facility] or [facility] will assist you with your medications when available as not all medications can be obtained through this program. Medications are added and deleted from the [address] as deemed necessary by the pharmaceutical companies. There is a chance that a medication you currently receive from [facility] may be discontinued. If this occurs an alternative medication will be sent to a local pharmacy for you to obtain and pay for. If your medications are new or have changed, and you get your medications from the [facility] [address] [place] [address]), you MUST talk to the pharmacy staff to sign the new prescription applications. If you don't sign the applications we cannot get the medications for you. It usually takes 6-8 weeks for your medications to arrive. The Pharmacy staff will call you when your medications are available. If you don't hear from them you call [phone] and inquire about your medicines. You are responsible for obtaining your medications. You will have 30 days to come in and  your medications. If you don't  your medicines within those 30 days, those medicines may be placed on the self as samples and you will have to start all over again by completing the applications and waiting the 6-8 weeks for your medicines to arrive.     Foot Care: [facility] through [facility] ([address])  Every second Tuesday of the month (except for holidays and election days) from 9am to 1 pm. The services provided by these ministry volunteers are free of charge with the option to donate. They will inspect your feet thoroughly, soak them for 10 minutes, cut and file your nails. They care for diabetics as well. Keep in mind this service is free and will be on a first come first serve basis. Bad teeth? Ask about the Dental Clinic to get you in front of a local dentist when a dentist is available. They only extract teeth. No fillings, root canals, or dentures. The bus leaves the second Thursday of the month for those on the list. (Ask about availability as these appointments are limited). If you are scheduled for the dentist and you fail to come into the Foundation to meet the bus, you WILL NOT be placed on the dental list again. IMPORTANT: if you have high blood pressure please take your blood pressure  medications before arriving to the Foundation. If your blood pressure is elevated  the dental clinic WILL NOT extract any teeth and you will not have another  opportunity to go to the clinic. DIABETES:   Do you have uncontrolled diabetes or you just want to learn more about your diabetes? Schedule with the Nurse navigator for our new 5-week Diabetes program. You will learn how to properly manage your diabetes: nutrition, exercise, medication therapy. Eye exams for Diabetics. Please let us know so we can add you to the list to see an eye doctor. These  appointments are limited. You will receive a free eye exam and free glasses if  needed. Unfortunately, if you are not a diabetic, we do not have a free service  for eye exams for you (yet!). We do have information on where to  go to get a  huge discount on eye exams and glasses. Sick visits: If you are sick and it is not an emergency call the office to schedule an appointment to see the provider.  Care in the office is FREE but charges will be applied if you go to the Emergency room. If you feel better and do not wish to attend your sick appointment please call  the office and cancel to avoid a no-show. Charges and cost items from the Foundation:    Most of our orders are covered by [address] but there ARE SOME CHARGES for items such as radiology interpretations and anesthesiology during procedures and surgeries that are not covered under [facility]. [facility]   Please make sure you have contacted [facility] to check on your payment options:   1 [phone] Mon - Fri 8-4pm. It is important that you are screened in order to qualify for assistance and to avoid huge medical charges. This service is to benefit you. The [facility] is not responsible for ANY charges you may accrue regardless of who ordered the medication, procedure, treatment or test. If you go to the Emergency Room, you WILL be charged! Behavior and emotional issues! It is stressful to be sick, have an illness, take medications, not have a job, not have medical insurance, have family issues or just getting older! Schedule an appointment with our mental health provider. She is in the office Mondays and Wednesdays from 8am to [id number] [facility] can also contact the following: The national suicide hotline (1-726-368-GGNO or 2-546.269.3779)    [address], [address] [place] [address]) - [address] [place] [address]   [phone]    Drug and Alcohol Addiction Issues! It is hard to stop a poor habit but there is help out there. Please feel free to attend any of the following support groups to help you kick the habit or go to [facility] Emergency Department to be evaluated by the psychiatric team. Never give up!!     Esther meetings: [facility] MONDAY 10:30 AM PUNEET Soriano [address] [facility] 8:00 PM [facility] SATURDAY NIGHT AFELISE HouserMckennaevon [facility]       TID BITS:  Disposing medicines in household trash: Almost all medicines can be thrown into your household trash. These include prescription and over-the-counter (OTC) drugs in pills, liquids, drops, patches, creams, and inhalers. Follow these steps:  1) Remove the drugs from their original containers and mix them with something undesirable, such as used coffee grounds, dirt, or cat litter. This makes the medicine less appealing to children and pets and unrecognizable to someone who might intentionally go through the trash looking for drugs. 2) Put the mixture in something you can close (a re-sealable zipper storage bag, empty can, or other container) to prevent the drug from leaking or spilling out. 3) Throw the container in the garbage. 4) Scratch out all your personal information on the empty medicine packaging to protect your identity and privacy. Throw the packaging away.     If you have a question about your medicine, ask your health care provider or pharmacist.

## 2019-06-04 NOTE — PROGRESS NOTES
Eric Jernigan, I have an appt with this patient today at 2:45pm, please call [facility] and get lab results.  Thank you

## 2019-07-05 DIAGNOSIS — J44.9 CHRONIC OBSTRUCTIVE PULMONARY DISEASE, UNSPECIFIED COPD TYPE (HCC): ICD-10-CM

## 2019-07-08 RX ORDER — ALBUTEROL SULFATE 90 UG/1
AEROSOL, METERED RESPIRATORY (INHALATION)
Refills: 0 | OUTPATIENT
Start: 2019-07-08

## 2019-07-08 NOTE — TELEPHONE ENCOUNTER
Requested Prescriptions     Refused Prescriptions Disp Refills    PROAIR HFA 90 mcg/actuation inhaler [Pharmacy Med Name: PROAIR HFA          AER]  0     Sig: INHALE 2 PUFFS BY MOUTH EVERY 6 HOURS AS NEEDED FOR WHEEZING FOR SHORTNESS OF BREATH     Refused By: Artem Andrade     Reason for Refusal: Patient has requested refill too soon

## 2019-09-05 RX ORDER — FLUTICASONE PROPIONATE AND SALMETEROL 250; 50 UG/1; UG/1
1 POWDER RESPIRATORY (INHALATION) EVERY 12 HOURS
Qty: 3 INHALER | Refills: 2 | Status: CANCELLED | OUTPATIENT
Start: 2019-09-05

## 2019-09-05 NOTE — PROGRESS NOTES
[facility]  [id number] [facility], [address]  [phone] [facility]/[phone] fax      9/9/2019    Reason for visit:   Chief Complaint   Patient presents with    Follow Up Chronic Condition    COPD       Patient: Amara Romero, 1980, [id number]       Primary MD: Perez Lynch NP    Subjective:   Amara Romero, a 45 y.o. male, who presents for Follow Up Chronic Condition and COPD      Past Medical History:   Diagnosis Date    Asthma 1986    Chronic back pain     Chronic neck pain     Chronic obstructive pulmonary disease ([facility] [address] 6/26/2018    Cluster headache     COPD (chronic obstructive pulmonary disease) ([facility] [address] 01/09/2017    Mild Obstructive airway disease. See PFT results    Current smoker     Dental caries     Hammer toe of left foot 05/14/2019    Hypertriglyceridemia     Noncompliance with medication regimen 12/19/2016    Overuse of medication 12/19/2016    Plantar warts     Scoliosis     Scoliosis of thoracic spine 12/19/2016    Shortness of breath 12/19/2016       No past surgical history on file. Social History     Socioeconomic History    Marital status: SINGLE     Spouse name: Not on file    Number of children: 0    Years of education: Not on file    Highest education level: Not on file   Occupational History    Not on file   Social Needs    Financial resource strain: Not on file    Food insecurity:     Worry: Not on file     Inability: Not on file    Transportation needs:     Medical: Not on file     Non-medical: Not on file   Tobacco Use    Smoking status: Current Every Day Smoker     Packs/day: 0.80     Types: Cigarettes    Smokeless tobacco: Never Used   Substance and Sexual Activity    Alcohol use:  Yes    Drug use: No     Types: Marijuana    Sexual activity: Not on file   Lifestyle    Physical activity:     Days per week: Not on file     Minutes per session: Not on file    Stress: Not on file   Relationships    Social connections:     Talks on phone: Not on file     Gets together: Not on file     Attends [employer] service: Not on file     Active member of club or organization: Not on file     Attends meetings of clubs or organizations: Not on file     Relationship status: Not on file    Intimate partner violence:     Fear of current or ex partner: Not on file     Emotionally abused: Not on file     Physically abused: Not on file     Forced sexual activity: Not on file   Other Topics Concern     Service No    Blood Transfusions No    Caffeine Concern No    Occupational Exposure No    Hobby Hazards No    Sleep Concern Yes    Stress Concern No    Weight Concern No    Special Diet No    Back Care Yes     Comment: scoliosis    Exercise Yes    Bike Helmet No    Seat Belt Yes    Self-Exams No   Social History Narrative    Not on file       No Known Allergies    Current Outpatient Medications on File Prior to Visit   Medication Sig Dispense Refill    PROAIR HFA 90 mcg/actuation inhaler INHALE 2 PUFFS BY MOUTH EVERY 6 HOURS AS NEEDED FOR WHEEZING FOR SHORTNESS OF BREATH 2 Inhaler 2    loratadine (CLARITIN) 10 mg tablet Take 1 Tab by mouth daily as needed for Allergies. 30 Tab 6    fluticasone propionate (FLONASE) 50 mcg/actuation nasal spray 2 Sprays by Both Nostrils route daily as needed for Allergies. 1 Bottle 3    albuterol-ipratropium (DUO-NEB) 2.5 mg-0.5 mg/3 ml nebu 3 mL by Nebulization route every six (6) hours as needed (shortness of breath). Indications: Bronchi Muscle Spasm resulting from COPD 30 Nebule 6    ibuprofen (MOTRIN) 600 mg tablet Take 1 Tab by mouth every six (6) hours as needed for Pain. 20 Tab 0     No current facility-administered medications on file prior to visit. Pt walks in today without difficulty for their routine 3 month follow up. Pt has medical insurance. Pt states he has followed up with podiatry for the warts on his left foot.  Pt states since seeing the podiatrist the warts have worsened. Pt states the podiatrist makes him nervous because \"He just cuts on me. \"    Pt states Advair is not working and has to use albuterol at least once a day. Objective:   Visit Vitals  /82   Pulse 89   Temp 98 °F (36.7 °C)   Resp 20   Ht 5' 11\" (1.803 m)   Wt 142 lb 6.4 oz (64.6 kg)   SpO2 97%   BMI 19.86 kg/m²      Wt Readings from Last 3 Encounters:   09/09/19 142 lb 6.4 oz (64.6 kg)   06/04/19 145 lb 6.4 oz (66 kg)   04/16/19 146 lb 6.4 oz (66.4 kg)     Lab Results   Component Value Date/Time    Glucose 95 03/01/2018 09:19 AM       Physical Exam   Constitutional: He is oriented to person, place, and time. He appears well-nourished. Tall, slender   HENT:   Head: Normocephalic and atraumatic. Neck: Normal range of motion. Neck supple. Cardiovascular: Normal rate, regular rhythm and normal heart sounds. Pulmonary/Chest: Effort normal and breath sounds normal.   Musculoskeletal: Normal range of motion. Neurological: He is alert and oriented to person, place, and time. Skin: Skin is warm and dry. Assessment:    Richard Willis who has risk factors including (see above previous medical hx) and:     1. Chronic obstructive pulmonary disease, unspecified COPD type ([facility] [address]  Dc'd advair 250mg and initiated 500mg due to pt having to use rescue inhaler daily. - albuterol (PROAIR HFA) 90 mcg/actuation inhaler; Take 2 Puffs by inhalation every six (6) hours as needed for Wheezing or Shortness of Breath. Dispense: 1 Inhaler; Refill: 2  - REFERRAL TO PRIMARY CARE  - fluticasone propion-salmeterol (ADVAIR/WIXELA) 500-50 mcg/dose diskus inhaler; Take 1 Puff by inhalation every twelve (12) hours. For COPD  Dispense: 1 Each; Refill: 2    2. Gastroesophageal reflux disease without esophagitis  Reorder    - omeprazole (PRILOSEC) 20 mg capsule; Take 1 Cap by mouth daily as needed (reflux). Dispense: 30 Cap; Refill: 2  - REFERRAL TO PRIMARY CARE    3.  Environmental allergies    - REFERRAL TO PRIMARY CARE    4. Current smoker  Counseled patient on the dangers of tobacco use, and was advised to quit. Reviewed strategies to maximize success, including the use of Chantix. Discussed the risks of continued tobacco use such as elevated blood pressure, vascular irritation with increased incidence of CVD with stroke or MI and PVD causing claudication, lung damage that could lead to COPD, cancer and death. Encouraged an approach to find a few healthy habits, write them down then plan to decrease their cigarette use by one each week till they are gone all together and to plan for stress that may cause them to want to restart and how to prevent it by having new coping mechanisms in place. 1-800-QUIT-NOW provided for counseling     5. Counseling on health promotion and disease prevention  Pt educated on the importance of obtaining the influenza vaccines. Pt verb understanding    Instructed pt to follow up with podiatrist as needed. Medication Alternative:  n/a    Written instructions followed our verbal discussion of all information discussed above, pending tests ordered and future goals/plans. Patient expressed understanding of current diagnosis, planned testing, follow up and if needed to contact the office for any questions or concerns prior to the next visit. Plan:   Med reconciliation completed with patient. Reviewed side effects of medications with the patient. Questions were answered and patient verb understanding. Discussed lab results with patient  N/a    Orders Placed This Encounter    REFERRAL TO PRIMARY CARE     Referral Priority:   Routine     Referral Type:   Consultation     Referral Reason:   Specialty Services Required     Referred to Provider:   Annita Duncan MD     Number of Visits Requested:   1    omeprazole (PRILOSEC) 20 mg capsule     Sig: Take 1 Cap by mouth daily as needed (reflux).      Dispense:  30 Cap     Refill:  2    albuterol (PROAIR HFA) 90 mcg/actuation inhaler     Sig: Take 2 Puffs by inhalation every six (6) hours as needed for Wheezing or Shortness of Breath. Dispense:  1 Inhaler     Refill:  2    fluticasone propion-salmeterol (ADVAIR/WIXELA) 500-50 mcg/dose diskus inhaler     Sig: Take 1 Puff by inhalation every twelve (12) hours. For COPD     Dispense:  1 Each     Refill:  2     Current Outpatient Medications   Medication Sig Dispense Refill    omeprazole (PRILOSEC) 20 mg capsule Take 1 Cap by mouth daily as needed (reflux). 30 Cap 2    albuterol (PROAIR HFA) 90 mcg/actuation inhaler Take 2 Puffs by inhalation every six (6) hours as needed for Wheezing or Shortness of Breath. 1 Inhaler 2    fluticasone propion-salmeterol (ADVAIR/WIXELA) 500-50 mcg/dose diskus inhaler Take 1 Puff by inhalation every twelve (12) hours. For COPD 1 Each 2    PROAIR HFA 90 mcg/actuation inhaler INHALE 2 PUFFS BY MOUTH EVERY 6 HOURS AS NEEDED FOR WHEEZING FOR SHORTNESS OF BREATH 2 Inhaler 2    loratadine (CLARITIN) 10 mg tablet Take 1 Tab by mouth daily as needed for Allergies. 30 Tab 6    fluticasone propionate (FLONASE) 50 mcg/actuation nasal spray 2 Sprays by Both Nostrils route daily as needed for Allergies. 1 Bottle 3    albuterol-ipratropium (DUO-NEB) 2.5 mg-0.5 mg/3 ml nebu 3 mL by Nebulization route every six (6) hours as needed (shortness of breath). Indications: Bronchi Muscle Spasm resulting from COPD 30 Nebule 6    ibuprofen (MOTRIN) 600 mg tablet Take 1 Tab by mouth every six (6) hours as needed for Pain. 20 Tab 0         No routine follow up appt needed due to patient having medical insurance. Patient is no longer eligible for care at [address] [place] [address] and will need to establish care with a Medicaid approved provider. Referral given. Tameka Sutherland.  [employer], [address] [place] [address]      I spent 25 minutes with the patient in face-to-face consultation, of which greater than 50% was spent in counseling and coordination of care as described above.

## 2019-09-09 ENCOUNTER — OFFICE VISIT (OUTPATIENT)
Dept: FAMILY MEDICINE CLINIC | Age: 39
End: 2019-09-09

## 2019-09-09 VITALS
WEIGHT: 142.4 LBS | HEIGHT: 71 IN | SYSTOLIC BLOOD PRESSURE: 132 MMHG | TEMPERATURE: 98 F | BODY MASS INDEX: 19.94 KG/M2 | DIASTOLIC BLOOD PRESSURE: 82 MMHG | OXYGEN SATURATION: 97 % | RESPIRATION RATE: 20 BRPM | HEART RATE: 89 BPM

## 2019-09-09 DIAGNOSIS — Z71.89 COUNSELING ON HEALTH PROMOTION AND DISEASE PREVENTION: ICD-10-CM

## 2019-09-09 DIAGNOSIS — K21.9 GASTROESOPHAGEAL REFLUX DISEASE WITHOUT ESOPHAGITIS: ICD-10-CM

## 2019-09-09 DIAGNOSIS — Z91.09 ENVIRONMENTAL ALLERGIES: Chronic | ICD-10-CM

## 2019-09-09 DIAGNOSIS — F17.200 CURRENT SMOKER: ICD-10-CM

## 2019-09-09 DIAGNOSIS — J44.9 CHRONIC OBSTRUCTIVE PULMONARY DISEASE, UNSPECIFIED COPD TYPE (HCC): Primary | ICD-10-CM

## 2019-09-09 RX ORDER — ALBUTEROL SULFATE 90 UG/1
2 AEROSOL, METERED RESPIRATORY (INHALATION)
Qty: 1 INHALER | Refills: 2 | Status: SHIPPED | OUTPATIENT
Start: 2019-09-09 | End: 2021-10-07

## 2019-09-09 RX ORDER — FLUTICASONE PROPIONATE AND SALMETEROL 500; 50 UG/1; UG/1
1 POWDER RESPIRATORY (INHALATION) EVERY 12 HOURS
Qty: 1 EACH | Refills: 2 | Status: SHIPPED | OUTPATIENT
Start: 2019-09-09 | End: 2021-10-07

## 2019-09-09 RX ORDER — OMEPRAZOLE 20 MG/1
20 CAPSULE, DELAYED RELEASE ORAL
Qty: 30 CAP | Refills: 2 | Status: SHIPPED | OUTPATIENT
Start: 2019-09-09

## 2019-09-09 NOTE — PATIENT INSTRUCTIONS
As of July 1st 2019, If you have MEDICAID insurance you can NO LONGER come to the [facility]. You have the option to choose who you want to see for your medical health as long as they are accepting new patients and take your insurance. You may also use the provider that is located on your card. The closet provider office to [facility] is [facility]. This office is in the [place] [address] next to [facility]. If you chose to go with [facility], please stop at our  and the  will schedule you a 3 month appointment. *[facility] [address] [place], [address]              Phone: [phone]        If you decide not to go with [facility], please locate a new provider in the next 1-2 weeks and call our office with the providers name and appointment date. This will allow our office to gather your medical information to transfer to your new provider to help reduce disconnect with your care. If you have MEDICARE insurance, Omaira[facility]ara Marie is located at [facility] Phone: [phone]. Please don't wait until the last minute to locate your new provider. Remember, you are responsible for your care, so please follow up with your new provider as scheduled.       Take care,  Dr. Kishan Urrutia

## 2020-01-08 ENCOUNTER — APPOINTMENT (OUTPATIENT)
Dept: GENERAL RADIOLOGY | Age: 40
End: 2020-01-08
Attending: PHYSICIAN ASSISTANT
Payer: MEDICAID

## 2020-01-08 ENCOUNTER — HOSPITAL ENCOUNTER (EMERGENCY)
Age: 40
Discharge: HOME | End: 2020-01-08
Attending: EMERGENCY MEDICINE
Payer: MEDICAID

## 2020-01-08 VITALS
HEART RATE: 76 BPM | TEMPERATURE: 97.8 F | WEIGHT: 150 LBS | DIASTOLIC BLOOD PRESSURE: 78 MMHG | RESPIRATION RATE: 16 BRPM | OXYGEN SATURATION: 97 % | BODY MASS INDEX: 21 KG/M2 | HEIGHT: 71 IN | SYSTOLIC BLOOD PRESSURE: 127 MMHG

## 2020-01-08 DIAGNOSIS — J01.00 ACUTE NON-RECURRENT MAXILLARY SINUSITIS: Primary | ICD-10-CM

## 2020-01-08 DIAGNOSIS — J06.9 VIRAL UPPER RESPIRATORY TRACT INFECTION: ICD-10-CM

## 2020-01-08 PROCEDURE — 71046 X-RAY EXAM CHEST 2 VIEWS: CPT

## 2020-01-08 PROCEDURE — 99282 EMERGENCY DEPT VISIT SF MDM: CPT

## 2020-01-08 RX ORDER — AMOXICILLIN AND CLAVULANATE POTASSIUM 875; 125 MG/1; MG/1
1 TABLET, FILM COATED ORAL 2 TIMES DAILY
Qty: 14 TAB | Refills: 0 | Status: SHIPPED | OUTPATIENT
Start: 2020-01-08 | End: 2021-10-07 | Stop reason: ALTCHOICE

## 2020-01-08 RX ORDER — BENZONATATE 100 MG/1
100 CAPSULE ORAL
Qty: 21 CAP | Refills: 0 | Status: SHIPPED | OUTPATIENT
Start: 2020-01-08 | End: 2020-01-15

## 2020-01-08 NOTE — ED PROVIDER NOTES
EMERGENCY DEPARTMENT HISTORY AND PHYSICAL EXAM    Date: 1/8/2020  Patient Name: Ryan Stevenson    History of Presenting Illness     No chief complaint on file. History Provided By: patient  Chief Complaint: productive cough, nasal congestion  Duration: 1 week  Timing: constant, worsening   Severity: mild, denies pain  Modifying Factors: hx of asthma and COPD  Associated Symptoms: none     Additional History (Context): Ryan Stevenson is a 44 y.o. male with a history of asthma and COPD who presents to the emergency department with productive cough and nasal congestion x1 week. Patient is insistent that he has a sinus infection so he has been taking his brothers amoxicillin for the past 4 days without relief. Patient denies fever/chills, sore throat, shortness of breath, ear pain, abdominal pain, or any other symptoms other than slight wheezing that he states is his baseline. Patient has also been taking Flonase without relief. No other concerns at this time. PCP: Unknown, Provider    Current Outpatient Medications   Medication Sig Dispense Refill    amoxicillin-clavulanate (AUGMENTIN) 875-125 mg per tablet Take 1 Tab by mouth two (2) times a day. 14 Tab 0    benzonatate (TESSALON PERLES) 100 mg capsule Take 1 Cap by mouth three (3) times daily as needed for Cough for up to 7 days. 21 Cap 0    omeprazole (PRILOSEC) 20 mg capsule Take 1 Cap by mouth daily as needed (reflux). 30 Cap 2    albuterol (PROAIR HFA) 90 mcg/actuation inhaler Take 2 Puffs by inhalation every six (6) hours as needed for Wheezing or Shortness of Breath. 1 Inhaler 2    fluticasone propion-salmeterol (ADVAIR/WIXELA) 500-50 mcg/dose diskus inhaler Take 1 Puff by inhalation every twelve (12) hours.  For COPD 1 Each 2    PROAIR HFA 90 mcg/actuation inhaler INHALE 2 PUFFS BY MOUTH EVERY 6 HOURS AS NEEDED FOR WHEEZING FOR SHORTNESS OF BREATH 2 Inhaler 2    loratadine (CLARITIN) 10 mg tablet Take 1 Tab by mouth daily as needed for Allergies. 30 Tab 6    fluticasone propionate (FLONASE) 50 mcg/actuation nasal spray 2 Sprays by Both Nostrils route daily as needed for Allergies. 1 Bottle 3    albuterol-ipratropium (DUO-NEB) 2.5 mg-0.5 mg/3 ml nebu 3 mL by Nebulization route every six (6) hours as needed (shortness of breath). Indications: Bronchi Muscle Spasm resulting from COPD 30 Nebule 6    ibuprofen (MOTRIN) 600 mg tablet Take 1 Tab by mouth every six (6) hours as needed for Pain. 20 Tab 0       Past History     Past Medical History:  Past Medical History:   Diagnosis Date    Asthma 1986    Chronic back pain     Chronic neck pain     Chronic obstructive pulmonary disease ([facility] [address] 6/26/2018    Cluster headache     COPD [facility]ch[facility] obstructive [facility] disease) ([email or web address] 01/09/2017    Mild Obstructive airway disease. See PFT results    Current smoker     Dental caries     Hammer toe of left foot 05/14/2019    Hypertriglyceridemia     Noncompliance with medication regimen 12/19/2016    Overuse of medication 12/19/2016    Plantar warts     Scoliosis     Scoliosis of thoracic spine 12/19/2016    Shortness of breath 12/19/2016     Past Surgical History:  No past surgical history on file. Family History:  Family History   Problem Relation Age of Onset    Heart Attack Father     Hypertension Father     Migraines Father      Social History:  Social History     Tobacco Use    Smoking status: Current Every Day Smoker     Packs/day: 0.80     Types: Cigarettes    Smokeless tobacco: Never Used   Substance Use Topics    Alcohol use: Yes    Drug use: No     Types: Marijuana     Allergies:  No Known Allergies      Review of Systems   Review of Systems   Constitutional: Negative for chills and fever. HENT: Positive for congestion and sinus pressure. Negative for ear pain, sinus pain, sore throat and trouble swallowing. Respiratory: Positive for cough and wheezing (Baseline). Negative for shortness of breath.     Cardiovascular: Negative for chest pain and palpitations. Gastrointestinal: Negative for abdominal pain and vomiting. Skin: Negative for rash and wound. Neurological: Negative for syncope and headaches. All other systems reviewed and are negative. All Other Systems Negative  Physical Exam     Vitals:    01/08/20 1415   BP: 127/78   Pulse: 76   Resp: 16   Temp: 97.8 °F (36.6 °C)   SpO2: 97%   Weight: 68 kg (150 lb)   Height: 5' 11\" (1.803 m)     Physical Exam  Vitals signs and nursing note reviewed. Constitutional:       General: He is not in acute distress. Appearance: Normal appearance. He is normal weight. He is not ill-appearing or toxic-appearing. HENT:      Head: Normocephalic and atraumatic. Right Ear: Tympanic membrane, ear canal and external ear normal.      Left Ear: Tympanic membrane, ear canal and external ear normal.      Nose: Nose normal.      Right Sinus: No maxillary sinus tenderness or frontal sinus tenderness. Left Sinus: No maxillary sinus tenderness or frontal sinus tenderness. Mouth/Throat:      Mouth: Mucous membranes are moist.      Dentition: Abnormal dentition (Poor dentition throughout). Pharynx: Oropharynx is clear. Posterior oropharyngeal erythema (Mild) present. No oropharyngeal exudate. Eyes:      Extraocular Movements: Extraocular movements intact. Conjunctiva/sclera: Conjunctivae normal.      Pupils: Pupils are equal, round, and reactive to light. Neck:      Musculoskeletal: Normal range of motion and neck supple. No neck rigidity. Cardiovascular:      Rate and Rhythm: Normal rate and regular rhythm. Heart sounds: Normal heart sounds. No murmur. No friction rub. No gallop. Pulmonary:      Effort: Pulmonary effort is normal. No respiratory distress. Breath sounds: No stridor. Wheezing (Few scattered expiratory) present. No rhonchi or rales. Musculoskeletal: Normal range of motion. General: No deformity.    Lymphadenopathy: Cervical: No cervical adenopathy. Skin:     General: Skin is warm and dry. Neurological:      General: No focal deficit present. Mental Status: He is alert and oriented to person, place, and time. Gait: Gait normal.      Comments: Gait is steady. Patient ambulates with ease. Psychiatric:         Mood and Affect: Mood normal.         Behavior: Behavior normal.       Diagnostic Study Results     Labs -   No results found for this or any previous visit (from the past 12 hour(s)). Radiologic Studies -   XR CHEST PA LAT   Final Result   IMPRESSION: No acute cardiopulmonary disease. CT Results  (Last 48 hours)    None        CXR Results  (Last 48 hours)               01/08/20 1427  XR CHEST PA LAT Final result    Impression:  IMPRESSION: No acute cardiopulmonary disease. Narrative:  EXAM: XR CHEST PA LAT       INDICATION: productive cough x 1 week       COMPARISON: None. FINDINGS: PA and lateral radiographs of the chest demonstrate clear lungs. Moderate pulmonary hyperinflation noted bilaterally. The cardiac and mediastinal   contours and pulmonary vascularity are normal. The bones and soft tissues are   within normal limits. Medical Decision Making   I am the first provider for this patient. I reviewed the vital signs, available nursing notes, past medical history, past surgical history, family history and social history. Vital Signs-Reviewed the patient's vital signs. Records Reviewed: Nursing Notes and Old Medical Records     Procedures: None     Provider Notes (Medical Decision Making): Patient is a 40-year-old male with a history of COPD and asthma who presents to the emergency department with a productive cough x1 week. Patient notes associated nasal congestion but denies fever, sore throat, or any other symptoms. Vital signs are stable. Patient is afebrile.   Physical exam is notable for a few scattered wheezes, however patient states that this is his baseline. Chest x-ray does not show any acute abnormalities. Patient is adamant about receiving an antibiotic for his symptoms and is insistent that this is a sinus infection. I believe that patient has a simple upper respiratory tract infection, however I will provide him with a prescription for Augmentin and stated that he should fill this in a few days if his symptoms do not improve. Patient was also provided a prescription for [facility]. He was also instructed to continue his Advair and other medications for asthma and COPD. Patient was advised to follow-up with his primary care provider and/or return to the ED for any severe worsening of symptoms including fever, worsening of cough, severe chest pain, or severe shortness of breath. Patient verbalized his agreement and understanding to this plan. MED RECONCILIATION:  No current facility-administered medications for this encounter. Current Outpatient Medications   Medication Sig    amoxicillin-clavulanate (AUGMENTIN) 875-125 mg per tablet Take 1 Tab by mouth two (2) times a day.  benzonatate (TESSALON PERLES) 100 mg capsule Take 1 Cap by mouth three (3) times daily as needed for Cough for up to 7 days.  omeprazole (PRILOSEC) 20 mg capsule Take 1 Cap by mouth daily as needed (reflux).  albuterol (PROAIR HFA) 90 mcg/actuation inhaler Take 2 Puffs by inhalation every six (6) hours as needed for Wheezing or Shortness of Breath.  fluticasone propion-salmeterol (ADVAIR/WIXELA) 500-50 mcg/dose diskus inhaler Take 1 Puff by inhalation every twelve (12) hours. For COPD    PROAIR HFA 90 mcg/actuation inhaler INHALE 2 PUFFS BY MOUTH EVERY 6 HOURS AS NEEDED FOR WHEEZING FOR SHORTNESS OF BREATH    loratadine (CLARITIN) 10 mg tablet Take 1 Tab by mouth daily as needed for Allergies.  fluticasone propionate (FLONASE) 50 mcg/actuation nasal spray 2 Sprays by Both Nostrils route daily as needed for Allergies.     albuterol-ipratropium (DUO-NEB) 2.5 mg-0.5 mg/3 ml nebu 3 mL by Nebulization route every six (6) hours as needed (shortness of breath). Indications: Bronchi Muscle Spasm resulting from COPD    ibuprofen (MOTRIN) 600 mg tablet Take 1 Tab by mouth every six (6) hours as needed for Pain. Disposition:  Home     DISCHARGE NOTE:   Pt has been reexamined. Patient has no new complaints, changes, or physical findings. Care plan outlined and precautions discussed. Results of workup were reviewed with the patient. All medications were reviewed with the patient. All of pt's questions and concerns were addressed. Patient was instructed and agrees to follow up with PCP as well as to return to the ED upon further deterioration. Patient is ready to go home. Follow-up Information     Follow up With Specialties Details Why Contact Info    Unknown, Provider  In 2 days If symptoms worsen Patient not available to ask      [facility] EMERGENCY DEPT Emergency Medicine  As needed, If symptoms worsen [address] [id number]  [phone]        Current Discharge Medication List      START taking these medications    Details   amoxicillin-clavulanate (AUGMENTIN) 875-125 mg per tablet Take 1 Tab by mouth two (2) times a day. Qty: 14 Tab, Refills: 0      benzonatate (TESSALON PERLES) 100 mg capsule Take 1 Cap by mouth three (3) times daily as needed for Cough for up to 7 days. Qty: 21 Cap, Refills: 0           Diagnosis     Clinical Impression:   1. Acute non-recurrent maxillary sinusitis    2. Viral upper respiratory tract infection      \"Please note that this dictation was completed with [email or web address], the [email or web address] voice recognition software. Quite often unanticipated grammatical, syntax, homophones, and other interpretive errors are inadvertently transcribed by the computer software. Please disregard these errors. Please excuse any errors that have escaped final proofreading. \"

## 2020-01-08 NOTE — DISCHARGE INSTRUCTIONS
Patient Education        Upper Respiratory Infection (Cold): Care Instructions  Your Care Instructions    An upper respiratory infection, or URI, is an infection of the nose, sinuses, or throat. URIs are spread by coughs, sneezes, and direct contact. The common cold is the most frequent kind of URI. The flu and sinus infections are other kinds of URIs. Almost all URIs are caused by viruses. Antibiotics won't cure them. But you can treat most infections with home care. This may include drinking lots of fluids and taking over-the-counter pain medicine. You will probably feel better in 4 to 10 days. The doctor has checked you carefully, but problems can develop later. If you notice any problems or new symptoms, get medical treatment right away. Follow-up care is a key part of your treatment and safety. Be sure to make and go to all appointments, and call your doctor if you are having problems. It's also a good idea to know your test results and keep a list of the medicines you take. How can you care for yourself at home? · To prevent dehydration, drink plenty of fluids, enough so that your urine is light yellow or clear like water. Choose water and other caffeine-free clear liquids until you feel better. If you have kidney, heart, or liver disease and have to limit fluids, talk with your doctor before you increase the amount of fluids you drink. · Take an over-the-counter pain medicine, such as acetaminophen (Tylenol), ibuprofen (Advil, Motrin), or naproxen (Aleve). Read and follow all instructions on the label. · Before you use cough and cold medicines, check the label. These medicines may not be safe for young children or for people with certain health problems. · Be careful when taking over-the-counter cold or flu medicines and Tylenol at the same time. Many of these medicines have acetaminophen, which is Tylenol. Read the labels to make sure that you are not taking more than the recommended dose.  Too much acetaminophen (Tylenol) can be harmful. · Get plenty of rest.  · Do not smoke or allow others to smoke around you. If you need help quitting, talk to your doctor about stop-smoking programs and medicines. These can increase your chances of quitting for good. When should you call for help? Call 911 anytime you think you may need emergency care. For example, call if:    · You have severe trouble breathing.    Call your doctor now or seek immediate medical care if:    · You seem to be getting much sicker.     · You have new or worse trouble breathing.     · You have a new or higher fever.     · You have a new rash.    Watch closely for changes in your health, and be sure to contact your doctor if:    · You have a new symptom, such as a sore throat, an earache, or sinus pain.     · You cough more deeply or more often, especially if you notice more mucus or a change in the color of your mucus.     · You do not get better as expected. Where can you learn more? Go to http://neptali-abhinav.info/. Enter V177 in the search box to learn more about \"Upper Respiratory Infection (Cold): Care Instructions. \"  Current as of: June 9, 2019  Content Version: 12.2  © 4499-8260 [employer]. Care instructions adapted under license by [employer] (which disclaims liability or warranty for this information). If you have questions about a medical condition or this instruction, always ask your healthcare professional. [place] [address] any warranty or liability for your use of this information. Patient Education        Sinusitis: Care Instructions  Your Care Instructions    Sinusitis is an infection of the lining of the sinus cavities in your head. Sinusitis often follows a cold. It causes pain and pressure in your head and face. In most cases, sinusitis gets better on its own in 1 to 2 weeks. But some mild symptoms may last for several weeks.  Sometimes antibiotics are needed. Follow-up care is a key part of your treatment and safety. Be sure to make and go to all appointments, and call your doctor if you are having problems. It's also a good idea to know your test results and keep a list of the medicines you take. How can you care for yourself at home? · Take an over-the-counter pain medicine, such as acetaminophen (Tylenol), ibuprofen (Advil, Motrin), or naproxen (Aleve). Read and follow all instructions on the label. · If the doctor prescribed antibiotics, take them as directed. Do not stop taking them just because you feel better. You need to take the full course of antibiotics. · Be careful when taking over-the-counter cold or flu medicines and Tylenol at the same time. Many of these medicines have acetaminophen, which is Tylenol. Read the labels to make sure that you are not taking more than the recommended dose. Too much acetaminophen (Tylenol) can be harmful. · Breathe warm, moist air from a steamy shower, a hot bath, or a sink filled with hot water. Avoid cold, dry air. Using a humidifier in your home may help. Follow the directions for cleaning the machine. · Use saline (saltwater) nasal washes to help keep your nasal passages open and wash out mucus and bacteria. You can buy saline nose drops at a grocery store or drugstore. Or you can make your own at home by adding 1 teaspoon of salt and 1 teaspoon of baking soda to 2 cups of distilled water. If you make your own, fill a bulb syringe with the solution, insert the tip into your nostril, and squeeze gently. Fonnie Register your nose. · Put a hot, wet towel or a warm gel pack on your face 3 or 4 times a day for 5 to 10 minutes each time. · Try a decongestant nasal spray like oxymetazoline (Afrin). Do not use it for more than 3 days in a row. Using it for more than 3 days can make your congestion worse. When should you call for help?   Call your doctor now or seek immediate medical care if:    · You have new or worse swelling or redness in your face or around your eyes.     · You have a new or higher fever.    Watch closely for changes in your health, and be sure to contact your doctor if:    · You have new or worse facial pain.     · The mucus from your nose becomes thicker (like pus) or has new blood in it.     · You are not getting better as expected. Where can you learn more? Go to http://neptali-abhinav.info/. Enter Y539 in the search box to learn more about \"Sinusitis: Care Instructions. \"  Current as of: October 21, 2018  Content Version: 12.2  © 7458-7940 [employer]. Care instructions adapted under license by [employer] (which disclaims liability or warranty for this information). If you have questions about a medical condition or this instruction, always ask your healthcare professional. [address] any warranty or liability for your use of this information.

## 2021-03-13 ENCOUNTER — HOSPITAL ENCOUNTER (EMERGENCY)
Age: 41
Discharge: HOME | End: 2021-03-13
Attending: EMERGENCY MEDICINE
Payer: MEDICAID

## 2021-03-13 DIAGNOSIS — Z91.09 ENVIRONMENTAL ALLERGIES: Chronic | ICD-10-CM

## 2021-03-13 DIAGNOSIS — K02.9 DENTAL DECAY: Primary | ICD-10-CM

## 2021-03-13 PROCEDURE — 99282 EMERGENCY DEPT VISIT SF MDM: CPT

## 2021-03-13 RX ORDER — TRAMADOL HYDROCHLORIDE 50 MG/1
50 TABLET ORAL
Qty: 6 TAB | Refills: 0 | Status: SHIPPED | OUTPATIENT
Start: 2021-03-13 | End: 2021-03-16

## 2021-03-13 RX ORDER — PENICILLIN V POTASSIUM 500 MG/1
500 TABLET, FILM COATED ORAL 4 TIMES DAILY
Qty: 40 TAB | Refills: 0 | Status: SHIPPED | OUTPATIENT
Start: 2021-03-13 | End: 2021-03-23

## 2021-03-13 RX ORDER — IBUPROFEN 600 MG/1
600 TABLET ORAL
Qty: 20 TAB | Refills: 0 | Status: SHIPPED | OUTPATIENT
Start: 2021-03-13

## 2021-03-13 NOTE — ED PROVIDER NOTES
EMERGENCY DEPARTMENT HISTORY AND PHYSICAL EXAM    1:39 PM      Date: 3/13/2021  Patient Name: Nam Anderson    History of Presenting Illness     Chief Complaint   Patient presents with    Dental Pain         History Provided By: patient    Additional History (Context): Nam Anderson is a 36 y.o. male presents with dental decay for many months getting progressively severe moderate intensity now going into the left side of his face left ear and the left side of his neck. Very talkative and nervous has multiple concerns thinks his neck lymph nodes are enlarged which they are not also directs me to a mass on his left hand finger believe the middle finger which is previously been examined. Chris Ho PCP: Unknown, Provider    Chief Complaint:   Duration:    Timing:    Location:   Quality:   Severity:   Modifying Factors:   Associated Symptoms:       Current Outpatient Medications   Medication Sig Dispense Refill    ibuprofen (MOTRIN) 600 mg tablet Take 1 Tab by mouth every six (6) hours as needed for Pain. 20 Tab 0    traMADoL (Ultram) 50 mg tablet Take 1 Tab by mouth every four (4) hours as needed for Pain for up to 3 days. Max Daily Amount: 300 mg. 6 Tab 0    penicillin v potassium (VEETID) 500 mg tablet Take 1 Tab by mouth four (4) times daily for 10 days. 40 Tab 0    amoxicillin-clavulanate (AUGMENTIN) 875-125 mg per tablet Take 1 Tab by mouth two (2) times a day. 14 Tab 0    omeprazole (PRILOSEC) 20 mg capsule Take 1 Cap by mouth daily as needed (reflux). 30 Cap 2    albuterol (PROAIR HFA) 90 mcg/actuation inhaler Take 2 Puffs by inhalation every six (6) hours as needed for Wheezing or Shortness of Breath. 1 Inhaler 2    fluticasone propion-salmeterol (ADVAIR/WIXELA) 500-50 mcg/dose diskus inhaler Take 1 Puff by inhalation every twelve (12) hours.  For COPD 1 Each 2    PROAIR HFA 90 mcg/actuation inhaler INHALE 2 PUFFS BY MOUTH EVERY 6 HOURS AS NEEDED FOR WHEEZING FOR SHORTNESS OF BREATH 2 Inhaler 2    loratadine (CLARITIN) 10 mg tablet Take 1 Tab by mouth daily as needed for Allergies. 30 Tab 6    fluticasone propionate (FLONASE) 50 mcg/actuation nasal spray 2 Sprays by Both Nostrils route daily as needed for Allergies. 1 Bottle 3    albuterol-ipratropium (DUO-NEB) 2.5 mg-0.5 mg/3 ml nebu 3 mL by Nebulization route every six (6) hours as needed (shortness of breath). Indications: Bronchi Muscle Spasm resulting from COPD 30 Nebule 6       Past History     Past Medical History:  Past Medical History:   Diagnosis Date    Asthma 1986    Chronic back pain     Chronic neck pain     Chronic obstructive pulmonary disease ([facility] [address] 6/26/2018    Cluster headache     COPD (chronic obstructive pulmonary disease) ([facility] 01/09/2017    Mild Obstructive airway disease. See PFT results    Current smoker     Dental caries     Hammer toe of left foot 05/14/2019    Hypertriglyceridemia     Noncompliance with medication regimen 12/19/2016    Overuse of medication 12/19/2016    Plantar warts     Scoliosis     Scoliosis of thoracic spine 12/19/2016    Shortness of breath 12/19/2016       Past Surgical History:  No past surgical history on file. Family History:  Family History   Problem Relation Age of Onset    Heart Attack Father     Hypertension Father     Migraines Father        Social History:  Social History     Tobacco Use    Smoking status: Current Every Day Smoker     Packs/day: 0.80     Types: Cigarettes    Smokeless tobacco: Never Used   Substance Use Topics    Alcohol use: Yes    Drug use: No     Types: Marijuana       Allergies:  No Known Allergies      Review of Systems     Review of Systems   Constitutional: Negative for diaphoresis and fever. HENT: Positive for dental problem. Negative for congestion and sore throat. Eyes: Negative for pain and itching. Respiratory: Negative for cough and shortness of breath. Cardiovascular: Negative for chest pain and palpitations.    Gastrointestinal: Negative for abdominal pain and diarrhea. Endocrine: Negative for polydipsia and polyuria. Genitourinary: Negative for dysuria and hematuria. Musculoskeletal: Negative for arthralgias and myalgias. Skin: Negative for rash and wound. Neurological: Negative for seizures and syncope. Hematological: Does not bruise/bleed easily. Psychiatric/Behavioral: Negative for agitation and hallucinations. Physical Exam       No data found. Physical Exam  Vitals signs and nursing note reviewed. Constitutional:       General: He is not in acute distress. Appearance: Normal appearance. He is well-developed. He is not ill-appearing. Comments: Nervous, talkative   HENT:      Head: Normocephalic and atraumatic. Mouth/Throat:      Comments: Global decay at the base of the teeth, black, no areas that make me suspect drainable abscess. No cervical lymphadenopathy  Eyes:      General: No scleral icterus. Conjunctiva/sclera: Conjunctivae normal.   Neck:      Musculoskeletal: Normal range of motion and neck supple. Vascular: No JVD. Cardiovascular:      Rate and Rhythm: Normal rate and regular rhythm. Pulmonary:      Effort: Pulmonary effort is normal. No respiratory distress. Musculoskeletal: Normal range of motion. Skin:     General: Skin is warm and dry. Neurological:      Mental Status: He is alert. Psychiatric:         Thought Content: Thought content normal.         Judgment: Judgment normal.           Diagnostic Study Results   Labs -  No results found for this or any previous visit (from the past 12 hour(s)). Radiologic Studies -   No orders to display     No results found. Medications ordered:   Medications - No data to display      Medical Decision Making   Initial Medical Decision Making and DDx: We will get him dentist follow-up he says he has challenges with money.   Prescription for tramadol Augmentin ibuprofen and instructions for these medications he is happy with this plan ready for discharge. The nodule on his finger he is already been told to follow-up with Dr. Shilpi Mcdowell hand surgery for removal and biopsy. He is concerned that this represents a another focus of infection but it does not there is no erythema tenderness or fluctuance. Probably ganglion cyst.    ED Course: Progress Notes, Reevaluation, and Consults:         I am the first provider for this patient. I reviewed the vital signs, available nursing notes, past medical history, past surgical history, family history and social history. No data found. Vital Signs-Reviewed the patient's vital signs. Pulse Oximetry Analysis, Cardiac Monitor, 12 lead ekg:       Interpreted by the EP. Records Reviewed: Nursing notes reviewed (Time of Review: 1:39 PM)    Procedures:   Critical Care Time:   Aspirin: (was aspirin given for stroke?)    Diagnosis     Clinical Impression:   1. Dental decay    2. Environmental allergies        Disposition: Discharged      Follow-up Information     Follow up With Specialties Details Why Contact Info    Follow-up with dentist               Patient's Medications   Start Taking    PENICILLIN V POTASSIUM (VEETID) 500 MG TABLET    Take 1 Tab by mouth four (4) times daily for 10 days. TRAMADOL (ULTRAM) 50 MG TABLET    Take 1 Tab by mouth every four (4) hours as needed for Pain for up to 3 days. Max Daily Amount: 300 mg. Continue Taking    ALBUTEROL (PROAIR HFA) 90 MCG/ACTUATION INHALER    Take 2 Puffs by inhalation every six (6) hours as needed for Wheezing or Shortness of Breath. ALBUTEROL-IPRATROPIUM (DUO-NEB) 2.5 MG-0.5 MG/3 ML NEBU    3 mL by Nebulization route every six (6) hours as needed (shortness of breath). Indications: Bronchi Muscle Spasm resulting from COPD    AMOXICILLIN-CLAVULANATE (AUGMENTIN) 875-125 MG PER TABLET    Take 1 Tab by mouth two (2) times a day.     FLUTICASONE PROPION-SALMETEROL (ADVAIR/WIXELA) 500-50 MCG/DOSE DISKUS INHALER    Take 1 Puff by inhalation every twelve (12) hours. For COPD    FLUTICASONE PROPIONATE (FLONASE) 50 MCG/ACTUATION NASAL SPRAY    2 Sprays by Both Nostrils route daily as needed for Allergies. LORATADINE (CLARITIN) 10 MG TABLET    Take 1 Tab by mouth daily as needed for Allergies. OMEPRAZOLE (PRILOSEC) 20 MG CAPSULE    Take 1 Cap by mouth daily as needed (reflux). PROAIR HFA 90 MCG/ACTUATION INHALER    INHALE 2 PUFFS BY MOUTH EVERY 6 HOURS AS NEEDED FOR WHEEZING FOR SHORTNESS OF BREATH   These Medications have changed    Modified Medication Previous Medication    IBUPROFEN (MOTRIN) 600 MG TABLET ibuprofen (MOTRIN) 600 mg tablet       Take 1 Tab by mouth every six (6) hours as needed for Pain. Take 1 Tab by mouth every six (6) hours as needed for Pain.    Stop Taking    No medications on file     _______________________________    Notes:    Marilee Salinas MD using Dragon dictation      _______________________________

## 2021-03-13 NOTE — DISCHARGE INSTRUCTIONS
Follow-up with one of the dental clinics below:  [facility]onal [facility] [phone]  [facility] ([address] [place] [phone] (7378 [facility] [phone]    Call to schedule an appointment as soon as possible.

## 2021-04-13 ENCOUNTER — TRANSCRIBE ORDER (OUTPATIENT)
Dept: SCHEDULING | Age: 41
End: 2021-04-13

## 2021-04-13 DIAGNOSIS — R59.0 INGUINAL LYMPHADENOPATHY: Primary | ICD-10-CM

## 2021-04-13 DIAGNOSIS — R59.0 CERVICAL LYMPHADENOPATHY: Primary | ICD-10-CM

## 2021-04-14 ENCOUNTER — TRANSCRIBE ORDER (OUTPATIENT)
Dept: SCHEDULING | Age: 41
End: 2021-04-14

## 2021-04-19 ENCOUNTER — HOSPITAL ENCOUNTER (OUTPATIENT)
Dept: ULTRASOUND IMAGING | Age: 41
Discharge: HOME | End: 2021-04-19
Attending: FAMILY MEDICINE
Payer: MEDICAID

## 2021-04-19 DIAGNOSIS — R59.0 CERVICAL LYMPHADENOPATHY: ICD-10-CM

## 2021-04-19 DIAGNOSIS — R59.0 INGUINAL LYMPHADENOPATHY: ICD-10-CM

## 2021-04-19 PROCEDURE — 76882 US LMTD JT/FCL EVL NVASC XTR: CPT

## 2021-04-19 PROCEDURE — 76536 US EXAM OF HEAD AND NECK: CPT

## 2021-10-01 PROBLEM — G57.60 PLANTAR NEUROMA: Status: ACTIVE | Noted: 2021-10-01

## 2021-10-01 PROBLEM — R59.0 CERVICAL LYMPHADENOPATHY: Status: ACTIVE | Noted: 2021-10-01

## 2021-10-01 PROBLEM — R45.851 SUICIDAL IDEATION: Status: ACTIVE | Noted: 2021-10-01

## 2021-10-01 PROBLEM — F17.210 CIGARETTE NICOTINE DEPENDENCE: Status: ACTIVE | Noted: 2021-10-01

## 2021-10-01 PROBLEM — F12.90 MARIJUANA SMOKER: Status: ACTIVE | Noted: 2021-10-01

## 2021-10-01 PROBLEM — R45.89 DEPRESSED MOOD: Status: ACTIVE | Noted: 2021-10-01

## 2021-10-01 PROBLEM — R59.1 LYMPHADENOPATHY: Status: ACTIVE | Noted: 2021-10-01

## 2021-10-01 PROBLEM — Q82.8 PLANTAR KERATOSIS: Status: ACTIVE | Noted: 2021-10-01

## 2021-10-01 PROBLEM — R03.0 ELEVATED BP WITHOUT DIAGNOSIS OF HYPERTENSION: Status: ACTIVE | Noted: 2021-10-01

## 2021-10-01 PROBLEM — Z78.9 ALCOHOL USE: Status: ACTIVE | Noted: 2021-10-01

## 2021-10-01 PROBLEM — K21.9 GERD (GASTROESOPHAGEAL REFLUX DISEASE): Status: ACTIVE | Noted: 2021-10-01

## 2021-10-01 PROBLEM — F41.9 ANXIETY DISORDER: Status: ACTIVE | Noted: 2021-10-01

## 2021-10-01 PROBLEM — R22.32 SUBCUTANEOUS MASS OF LEFT HAND: Status: ACTIVE | Noted: 2021-10-01

## 2021-10-01 RX ORDER — PANTOPRAZOLE SODIUM 40 MG/1
TABLET, DELAYED RELEASE ORAL
COMMUNITY
Start: 2020-09-22 | End: 2021-10-07

## 2021-10-01 RX ORDER — SERTRALINE HYDROCHLORIDE 25 MG/1
TABLET, FILM COATED ORAL
COMMUNITY
Start: 2021-01-25 | End: 2021-10-07

## 2021-10-07 ENCOUNTER — OFFICE VISIT (OUTPATIENT)
Dept: PULMONOLOGY | Age: 41
End: 2021-10-07
Payer: MEDICAID

## 2021-10-07 VITALS
DIASTOLIC BLOOD PRESSURE: 79 MMHG | OXYGEN SATURATION: 99 % | HEIGHT: 71 IN | TEMPERATURE: 98.6 F | HEART RATE: 70 BPM | WEIGHT: 141.2 LBS | RESPIRATION RATE: 18 BRPM | BODY MASS INDEX: 19.77 KG/M2 | SYSTOLIC BLOOD PRESSURE: 116 MMHG

## 2021-10-07 DIAGNOSIS — J30.9 CHRONIC ALLERGIC RHINITIS: ICD-10-CM

## 2021-10-07 DIAGNOSIS — F17.210 CIGARETTE NICOTINE DEPENDENCE WITHOUT COMPLICATION: ICD-10-CM

## 2021-10-07 DIAGNOSIS — Z87.891 PERSONAL HISTORY OF TOBACCO USE, PRESENTING HAZARDS TO HEALTH: ICD-10-CM

## 2021-10-07 DIAGNOSIS — Z23 NEEDS FLU SHOT: ICD-10-CM

## 2021-10-07 DIAGNOSIS — J30.89 ENVIRONMENTAL AND SEASONAL ALLERGIES: ICD-10-CM

## 2021-10-07 DIAGNOSIS — J45.50 POORLY CONTROLLED SEVERE PERSISTENT ASTHMA WITHOUT COMPLICATION: Primary | ICD-10-CM

## 2021-10-07 DIAGNOSIS — R06.02 SHORTNESS OF BREATH: ICD-10-CM

## 2021-10-07 DIAGNOSIS — J44.9 ASTHMA-COPD OVERLAP SYNDROME (HCC): ICD-10-CM

## 2021-10-07 PROCEDURE — 99407 BEHAV CHNG SMOKING > 10 MIN: CPT | Performed by: INTERNAL MEDICINE

## 2021-10-07 PROCEDURE — 90686 IIV4 VACC NO PRSV 0.5 ML IM: CPT | Performed by: INTERNAL MEDICINE

## 2021-10-07 PROCEDURE — 99205 OFFICE O/P NEW HI 60 MIN: CPT | Performed by: INTERNAL MEDICINE

## 2021-10-07 RX ORDER — CROMOLYN SODIUM 40 MG/ML
SOLUTION/ DROPS OPHTHALMIC
COMMUNITY
Start: 2021-09-17

## 2021-10-07 RX ORDER — ALBUTEROL SULFATE 90 UG/1
1-2 AEROSOL, METERED RESPIRATORY (INHALATION)
Qty: 3 EACH | Refills: 5 | Status: SHIPPED | OUTPATIENT
Start: 2021-10-07 | End: 2022-07-27

## 2021-10-07 RX ORDER — DM/P-EPHED/ACETAMINOPH/DOXYLAM 30-7.5/3
2 LIQUID (ML) ORAL
Qty: 300 LOZENGE | Refills: 5 | Status: SHIPPED | OUTPATIENT
Start: 2021-10-07

## 2021-10-07 RX ORDER — BUDESONIDE AND FORMOTEROL FUMARATE DIHYDRATE 160; 4.5 UG/1; UG/1
2 AEROSOL RESPIRATORY (INHALATION) 2 TIMES DAILY
Qty: 3 EACH | Refills: 3 | Status: SHIPPED | OUTPATIENT
Start: 2021-10-07

## 2021-10-07 RX ORDER — CETIRIZINE HCL 10 MG
10 TABLET ORAL
COMMUNITY

## 2021-10-07 RX ORDER — TIOTROPIUM BROMIDE 18 UG/1
1 CAPSULE ORAL; RESPIRATORY (INHALATION) DAILY
COMMUNITY
Start: 2021-09-17 | End: 2021-10-07 | Stop reason: SDUPTHER

## 2021-10-07 RX ORDER — AZELASTINE 1 MG/ML
1 SPRAY, METERED NASAL 2 TIMES DAILY
Qty: 1 EACH | Refills: 3 | Status: SHIPPED | OUTPATIENT
Start: 2021-10-07

## 2021-10-07 RX ORDER — TIOTROPIUM BROMIDE 18 UG/1
1 CAPSULE ORAL; RESPIRATORY (INHALATION) DAILY
Qty: 90 CAPSULE | Refills: 3 | Status: SHIPPED | OUTPATIENT
Start: 2021-10-07

## 2021-10-07 RX ORDER — ESCITALOPRAM OXALATE 10 MG/1
20 TABLET ORAL DAILY
COMMUNITY
Start: 2021-09-17

## 2021-10-07 RX ORDER — IPRATROPIUM BROMIDE AND ALBUTEROL SULFATE 2.5; .5 MG/3ML; MG/3ML
3 SOLUTION RESPIRATORY (INHALATION)
Qty: 360 NEBULE | Refills: 3 | Status: SHIPPED | OUTPATIENT
Start: 2021-10-07

## 2021-10-07 NOTE — PROGRESS NOTES
Cem Granda presents today for   Chief Complaint   Patient presents with   [facility] Referral / Consult     referred by Dr. Kanwal Oneal for asthma /COPD    Results     CXR 1/8/2020       Is someone accompanying this pt? No    Is the patient using any DME equipment during OV? Yes. nebulizer    -DME Company N/A    Depression Screening:  3 most recent PHQ Screens 10/7/2021   Little interest or pleasure in doing things Several days   Feeling down, depressed, irritable, or hopeless Several days   Total Score PHQ 2 2       Learning Assessment:  Learning Assessment 12/12/2018   PRIMARY LEARNER Patient   HIGHEST LEVEL OF EDUCATION - PRIMARY LEARNER  -   BARRIERS PRIMARY LEARNER -   CO-LEARNER CAREGIVER -   PRIMARY LANGUAGE ENGLISH   LEARNER PREFERENCE PRIMARY DEMONSTRATION   ANSWERED BY Patient   RELATIONSHIP SELF       Abuse Screening:  No flowsheet data found. Fall Risk  No flowsheet data found. Coordination of Care:  1. Have you been to the ER, urgent care clinic since your last visit? Hospitalized since your last visit? Yes; Where: [facility], When: 3/13/2021-dental decay    2. Have you seen or consulted any other health care providers outside of the [address] [place] [address] since your last visit? Include any pap smears or colon screening. Yes. Dr. Eric Mccabe, referring provider    Per patient, received COVID vaccine (Neno David) but did not bring card. Unable to update immunization record. Cem Granda is a 36 y.o. male who presents for routine immunizations. He denies any symptoms , reactions or allergies that would exclude them from being immunized today. Risks and adverse reactions were discussed and the VIS was given to them. All questions were addressed. He was observed for 10 min post injection. There were no reactions observed.     Nereida Scanlon LPN

## 2021-10-07 NOTE — PROGRESS NOTES
[address] [place] [address] [facility]  Pulmonary, Critical Care, and Sleep Medicine    Pulmonary Office Initial Consultation  Name: Henry Harada 36 y.o. male  MRN: [id number]  : 1980  Service Date: 10/07/21    Referring Provider: Stacy Maldonado, [address],  [address]  Chief Complaint:   Chief Complaint   Patient presents with   Miguel Rueda Referral / Consult     referred by Dr. Tabitha Magana for asthma /COPD    Results     CXR 2020         History of Present Illness:  Henry Harada is a 36 y.o. male, who presents to Pulmonary clinic referred for asthma management. Pt reports hx of asthma as a child. Was treated with allergy shots as a child. Reports that once he came off at age 25, and his symptoms worsened. Pt on Advair 500/50mcg 1 puff BID and Spiriva once daily. Reports was previously on lower doses but had severe symptoms so doses were increased. Reports daily ongoing frequent symptoms with multiple triggers  Symptoms: wheezing, chest tightness, dyspnea, nonproductive cough  Triggers: pollen and ragweed, perfumes, chemicals, dust, secondhand smoke  Nocturnal awakenings: twice a week  PRN albuterol use: at least 2x per day  Exacerbations requiring prednisone/hospitalization: once. Patient reports symptoms, however avoids prednisone due to symptoms of feeling jittery  Pets: 10 cats  House: carpet/hardwood  Smoking hx: 3-4 cigarettes per day. , prior 1PPD for 25 years  Also smokes marijuana  Occ Hx:  Landscaping  Reports seasonal allergies including nasal drainage, has had issues with Flonase or when taking it would lead to worsening chest congestion with postnasal drip. Patient reports only taking brand Zyrtec, reports issues with generic form.     Past Medical History:   Diagnosis Date    Asthma     Chronic back pain     Chronic neck pain     Chronic obstructive pulmonary disease ([address] 6/26/2018    Cluster headache     COPD (chronic obstructive pulmonary disease) ([facility] [address] 01/09/2017    Mild Obstructive airway disease. See PFT results    Current smoker     Dental caries     Hammer toe of left foot 05/14/2019    Hypertriglyceridemia     Noncompliance with medication regimen 12/19/2016    Overuse of medication 12/19/2016    Plantar warts     Scoliosis     Scoliosis of thoracic spine 12/19/2016    Shortness of breath 12/19/2016     History reviewed. No pertinent surgical history. Family History   Problem Relation Age of Onset    Heart Attack Father     Hypertension Father     Migraines Father      Social History     Socioeconomic History    Marital status: SINGLE     Spouse name: Not on file    Number of children: 0    Years of education: Not on file    Highest education level: Not on file   Occupational History    Not on file   Tobacco Use    Smoking status: Current Some Day Smoker     Packs/day: 0.80     Years: 20.00     Pack years: 16.00     Types: Cigarettes    Smokeless tobacco: Never Used   Vaping Use    Vaping Use: Never used   Substance and Sexual Activity    Alcohol use: Yes    Drug use: No     Types: Marijuana    Sexual activity: Not on file   Other Topics Concern     Service No    Blood Transfusions No    Caffeine Concern No    Occupational Exposure No    Hobby Hazards No    Sleep Concern Yes    Stress Concern No    Weight Concern No    Special Diet No    Back Care Yes     Comment: scoliosis    Exercise Yes    Bike Helmet No    Seat Belt Yes    Self-Exams No   Social History Narrative    Not on file     Social Determinants of Health     Financial Resource Strain:     Difficulty of Paying Living Expenses:    Food Insecurity:     Worried About Running Out of Food in the Last Year:     Ran Out of Food in the Last Year:    Transportation Needs:     Lack of Transportation (Medical):      Lack of Transportation (Non-Medical):    Physical Activity:     Days of Exercise per Week:     Minutes of Exercise per Session:    Stress:     Feeling of Stress :    Social Connections:     Frequency of Communication with Friends and Family:     Frequency of Social Gatherings with Friends and Family:     Attends Jehovah's witness Services:     Active Member of Clubs or Organizations:     Attends Club or Organization Meetings:     Marital Status:    Intimate Partner Violence:     Fear of Current or Ex-Partner:     Emotionally Abused:     Physically Abused:     Sexually Abused:      No Known Allergies    Prior to Admission medications    Medication Sig Start Date End Date Taking? Authorizing Provider   cromolyn (OPTICROM) 4 % ophthalmic solution INSTILL 1 DROP INTO AFFECTED EYE(S) 4 TIMES DAILY 9/17/21  Yes Provider, Historical   escitalopram oxalate (LEXAPRO) 10 mg tablet Take 20 mg by mouth daily. 9/17/21  Yes Provider, Historical   Spiriva with HandiHaler 18 mcg inhalation capsule Take 1 Capsule by inhalation daily. 9/17/21  Yes Provider, Historical   cetirizine (ZyrTEC) 10 mg tablet Take 10 mg by mouth daily as needed for Allergies. Yes Provider, Historical   ibuprofen (MOTRIN) 600 mg tablet Take 1 Tab by mouth every six (6) hours as needed for Pain. 3/13/21  Yes Yue Kearney MD   albuterol (PROAIR HFA) 90 mcg/actuation inhaler Take 2 Puffs by inhalation every six (6) hours as needed for Wheezing or Shortness of Breath. 9/9/19  Yes Sharon Reeves DNP   fluticasone propion-salmeterol (ADVAIR/WIXELA) 500-50 mcg/dose diskus inhaler Take 1 Puff by inhalation every twelve (12) hours. For COPD 9/9/19  Yes Kelia Reeves [place]   PROAIR HFA 90 mcg/actuation inhaler INHALE 2 PUFFS BY MOUTH EVERY 6 HOURS AS NEEDED FOR WHEEZING FOR SHORTNESS OF BREATH 8/12/19  Yes Sharon Reeves [place]   albuterol-ipratropium (DUO-NEB) 2.5 mg-0.5 mg/3 ml nebu 3 mL by Nebulization route every six (6) hours as needed (shortness of breath).  Indications: Bronchi Muscle Spasm resulting from COPD 4/16/19  Yes [place], [place]   pantoprazole (PROTONIX) 40 mg tablet Take  by mouth. Patient not taking: Reported on 10/7/2021 9/22/20 10/7/21  Provider, Historical   sertraline (ZOLOFT) 25 mg tablet Take  by mouth. Patient not taking: Reported on 10/7/2021 1/25/21 10/7/21  Provider, Historical   omeprazole (PRILOSEC) 20 mg capsule Take 1 Cap by mouth daily as needed (reflux). Patient not taking: Reported on 10/7/2021 9/9/19   Donya Ivey DNP   loratadine (CLARITIN) 10 mg tablet Take 1 Tab by mouth daily as needed for Allergies. Patient not taking: Reported on 10/7/2021 6/4/19   Donya Ivey DNP   fluticasone propionate [facility]) 50 mcg/actuation nasal spray 2 Sprays by Both Nostrils route daily as needed for Allergies. Patient not taking: Reported on 10/7/2021 6/4/19   Donya Ivey DNP     Immunization History   Administered Date(s) Administered    Influenza Vaccine ([address] [place] [address]) PF (>6 Mo Flulaval, Fluarix, and >3 Yrs [place] [id number]) 10/29/2020, 10/07/2021    Pneumococcal Polysaccharide (PPSV-23) 11/11/2019       Review of Systems:  A complete review of systems was performed as stated in the HPI, all others are negative.       Objective:    Physical Exam:  /79 (BP 1 Location: Left upper arm, BP Patient Position: Sitting, BP Cuff Size: Adult)   Pulse 70   Temp 98.6 °F (37 °C) (Temporal)   Resp 18   Ht 5' 11\" (1.803 m)   Wt 64 kg (141 lb 3.2 oz)   SpO2 99%   BMI 19.69 kg/m²   Vitals were personally reviewed  Gen: no acute distress, pleasant and cooperative, sitting up in chair, ambulates without difficulty, thin  HEENT: normocephalic/atraumatic, no ocular drainage, EOMI, no scleral icterus, nasal bridge midline, unable to assess nasal and oral cavities due to patient wearing mask in the setting of COVID-19 pandemic  Neck: supple, trachea midline, no JVD, no cervical and supraclavicular adenopathy  CVS: regular rate rhythm, S1/S2, no murmurs/rubs/gallops  Lungs: good air entry B/L, CTABL, no wheezes/rales/rhonchi  Psych: Fairly good memory with some long-term limitations, normal thought content, thought processing shows tangentiality and mild flight of ideas    Labs: I have reviewed the patient's available labs  Lab Results   Component Value Date/Time    WBC 8.8 12/14/2016 08:38 AM    HGB 14.9 12/14/2016 08:38 AM    HCT 43.8 12/14/2016 08:38 AM    PLATELET 320 17/97/3804 08:38 AM    MCV 96.7 12/14/2016 08:38 AM     Lab Results   Component Value Date/Time    Sodium 138 03/01/2018 09:19 AM    Potassium 4.0 03/01/2018 09:19 AM    Chloride 106 03/01/2018 09:19 AM    CO2 27 03/01/2018 09:19 AM    Anion gap 5 03/01/2018 09:19 AM    Glucose 95 03/01/2018 09:19 AM    BUN 14 03/01/2018 09:19 AM    Creatinine 1.08 03/01/2018 09:19 AM    BUN/Creatinine ratio 13 03/01/2018 09:19 AM    GFR est AA >60 03/01/2018 09:19 AM    GFR est non-AA >60 03/01/2018 09:19 AM    Calcium 9.1 03/01/2018 09:19 AM    Bilirubin, total 0.7 03/01/2018 09:19 AM    Alk. phosphatase 55 03/01/2018 09:19 AM    Protein, total 7.4 03/01/2018 09:19 AM    Albumin 4.4 03/01/2018 09:19 AM    Globulin 3.0 03/01/2018 09:19 AM    A-G Ratio 1.5 03/01/2018 09:19 AM    ALT (SGPT) 13 (L) 03/01/2018 09:19 AM    AST (SGOT) 16 03/01/2018 09:19 AM       Outside records reviewed in clinic as follows:  -Last progress note by PCP, Dr. Caryl Kendall on 7/6/2021 reports patient has a history of COPD, taking Spiriva and albuterol, stating that he cannot breathe all the time, having ongoing chest pain wheezing and increased work of breathing, has not seen a pulmonologist in 10 years, interested in seeing a lung specialist.  Patient also would like to get tested for allergies. Patient reported to have asthma and COPD, referred to pulmonary.   Of note, note makes no mention of Advair    Imaging:  I have personally reviewed patient's imaging as follows: Chest x-ray PA and lateral from 1/8/2020 shows clear lung fields bilaterally without infiltrate, consolidation, mass, effusion. Official report per radiology:  XR Results (most recent):  Results from Hospital Encounter encounter on 01/08/20    XR CHEST PA LAT    Narrative  EXAM: XR CHEST PA LAT    INDICATION: productive cough x 1 week    COMPARISON: None. FINDINGS: PA and lateral radiographs of the chest demonstrate clear lungs. Moderate pulmonary hyperinflation noted bilaterally. The cardiac and mediastinal  contours and pulmonary vascularity are normal. The bones and soft tissues are  within normal limits. Impression  IMPRESSION: No acute cardiopulmonary disease. PFTs:  I have reviewed the patient's PFTs as follows: From 1/4/2017, spirometry shows a moderate obstruction with very significant bronchodilator response. Lung volumes show moderate air-trapping. Diffusion capacity is mildly reduced. TTE:  I have reviewed the patient's TTE results  No results found for this or any previous visit. Assessment and Plan:  36 y.o. male with:    Impression:  1. Severe persistent asthma, poorly controlled: Patient is poorly controlled despite being on high dose ICS/LABA as well as LAMA therapy. This is likely due to ongoing smoking in the setting of underlying severe allergies as well as chronic exposure from occupation, at least 10 cats in his domicile. Patient has had at least 2 exacerbations in the last year, however patient not taking steroids due to side effects  2. Suspect COPD overlap syndrome: This is based off of moderate obstruction seen on PFTs, however this may be also due to remodeling from severe asthma. Patient does have significant smoking exposure despite young age, however fairly young to develop overt COPD  3. Dyspnea on exertion/shortness of breath: Due to above  4. Chronic allergic rhinitis: Was treated by SCIT therapy until age 25 per patient  5.   Nicotine dependence to cigarettes: Patient currently smoking 0.25 pack/day, prior 1 pack/day smoker for 25 years    Plan:  -Advised patient that he will likely need biologic therapy given severe symptoms of asthma. Advised patient we will maximize therapy, and trend symptoms, if symptoms remain the severe, will pursue biologic therapy at next visit  -Full PFTs at next visit  -Refer patient to ENT for allergy shots/SCIT therapy  -Discontinue Advair and start Symbicort 160/4.5 MCG 2 puffs twice daily. Counseled patient to rinse mouth thoroughly after each use  -Start dual ICS therapy with Asmanex Twisthaler 220 mcg 1 puff twice daily. Counseled patient to rinse mouth thoroughly after each use  -Continue Spiriva HandiHaler 1 puff once daily  -Continue Zyrtec 10 mg daily  -Patient declines intranasal steroid, will start Astelin therapy 1 spray each nostril twice daily. Counseled patient regarding potential side effects  -Continue albuterol HFA 1-2puffs q4-6h PRN. Counseled patient that this is their rescue inhaler. Also counseled patient regarding premedication 15-30m prior to exercise or exposure to very cold air. Also advised patient that he may continue duonebs as needed  -Counseled patient on proper inhaler technique  -Counseled patient to avoid potential triggers of asthma. Advised to wear a mask when in a kathy environment and while mowing the lawn or in high pollen area. Advised regarding home remediation including to wash/steam clean rugs/carpet, drapes, bedding on a frequent basis and consider allergy covers for pillows and bedding.  -Immunizations reviewed, pneumococcal and Covid vaccinations up-to-date. Administered influenza vaccination in today's visit  -Counseled patient regarding lifestyle precautions in COVID-19 pandemic including wearing mask in public and confined spaces, social/physical distancing, frequent hand hygiene, etc.  Advised pt to receive COVID-19 booster when possible given severe asthma  -I spent 13 minutes with patient regarding cessation of smoking cigarettes.   I reviewed health risks of tobacco use including increased risk of MI, stroke, cancer, etc.  We reviewed various approaches to cessation including pills, patches, inhaler, gum, weaning self, \"cold turkey\", etc.  Pt was agreeable to cessation -- I prescribed nicotine replacement with nicotine lozenges q1h PRN    Follow-up and Dispositions    · Return in about 3 months (around 1/7/2022).        Orders Placed This Encounter    AMB POC PFT COMPLETE W/BRONCHODILATOR    AMB POC PFT COMPLETE W/O BRONCHODILATOR    GAS DILUT/WASHOUT LUNG VOL W/WO DISTRIB VENT&VOL    DIFFUSING CAPACITY    Influenza Virus Vaccine QUAD, PF Syr 6 Months + (Flulaval, Fluzone, Fluarix [id number])    REFERRAL TO ENT-OTOLARYNGOLOGY    cetirizine (ZyrTEC) 10 mg tablet    nicotine buccal (POLACRILEX) 2 mg lozg lozenge    azelastine (ASTELIN) 137 mcg (0.1 %) nasal spray    budesonide-formoteroL (SYMBICORT) 160-4.5 mcg/actuation HFAA    mometasone (ASMANEX TWISTHALER) 220 mcg/ actuation (120) aepb inhaler    albuterol-ipratropium (DUO-NEB) 2.5 mg-0.5 mg/3 ml nebu    albuterol (PROVENTIL HFA, VENTOLIN HFA, PROAIR HFA) 90 mcg/actuation inhaler    Spiriva with HandiHaler 18 mcg inhalation capsule         Moises Magaña MD/MPH     Pulmonary, Critical Care Medicine  [place] [facility]

## 2021-10-07 NOTE — PATIENT INSTRUCTIONS
Managing Your Allergies: Care Instructions  Your Care Instructions     Managing your allergies is an important part of staying healthy. Your doctor will help you find out what may be the cause of the allergies. Common causes of symptoms are house dust and dust mites, animal dander, mold, and pollen. As soon as you know what triggers your symptoms, try to avoid those things. This can help prevent allergy symptoms, asthma, and other health problems. Ask your doctor about allergy medicine or immunotherapy. These treatments may help reduce or prevent allergy symptoms. Follow-up care is a key part of your treatment and safety. Be sure to make and go to all appointments, and call your doctor if you are having problems. It's also a good idea to know your test results and keep a list of the medicines you take. How can you care for yourself at home? · If you have been told by your doctor that dust or dust mites are causing your allergy, decrease the dust around your bed:  ? Wash sheets, pillowcases, and other bedding in hot water every week. ? Use dust-proof covers for pillows, duvets, and mattresses. Avoid plastic covers because they tear easily and do not \"breathe. \" Wash as instructed on the label. ? Do not use any blankets and pillows that you do not need. ? Use blankets that you can wash in your washing machine. ? Consider removing drapes and carpets, which attract and hold dust, from your bedroom. · If you are allergic to house dust and mites, do not use home humidifiers. Your doctor can suggest ways you can control dust and mites. · Look for signs of cockroaches. Cockroaches cause allergic reactions. Use cockroach baits to get rid of them. Then, clean your home well. Cockroaches like areas where grocery bags, newspapers, empty bottles, or cardboard boxes are stored. Do not keep these inside your home, and keep trash and food containers sealed.  Seal off any spots where cockroaches might enter your home.  · If you are allergic to mold, get rid of furniture, rugs, and drapes that smell musty. Check for mold in the bathroom. · If you are allergic to outdoor pollen or mold spores, use air-conditioning. Change or clean all filters every month. Keep windows closed. · If you are allergic to pollen, stay inside when pollen counts are high. Use a vacuum  with a HEPA filter or a double-thickness filter at least two times each week. · Stay inside when air pollution is bad. Avoid paint fumes, perfumes, and other strong odors. · Avoid conditions that make your allergies worse. Stay away from smoke. Do not smoke or let anyone else smoke in your house. Do not use fireplaces or wood-burning stoves. · If you are allergic to your pets, change the air filter in your furnace every month. Use high-efficiency filters. · If you are allergic to pet dander, keep pets outside or out of your bedroom. Old carpet and cloth furniture can hold a lot of animal dander. You may need to replace them. When should you call for help? Give an epinephrine shot if:    · You think you are having a severe allergic reaction. After giving an epinephrine shot call 911, even if you feel better. Call 911 if:    · You have symptoms of a severe allergic reaction. These may include:  ? Sudden raised, red areas (hives) all over your body. ? Swelling of the throat, mouth, lips, or tongue. ? Trouble breathing. ? Passing out (losing consciousness). Or you may feel very lightheaded or suddenly feel weak, confused, or restless.     · You have been given an epinephrine shot, even if you feel better. Call your doctor now or seek immediate medical care if:    · You have symptoms of an allergic reaction, such as:  ? A rash or hives (raised, red areas on the skin). ? Itching. ? Swelling. ? Belly pain, nausea, or vomiting.    Watch closely for changes in your health, and be sure to contact your doctor if:    · Your allergies get worse.     · You need help controlling your allergies.     · You have questions about allergy testing.     · You do not get better as expected. Where can you learn more? Go to http://www.gray.com/  Enter L249 in the search box to learn more about \"Managing Your Allergies: Care Instructions. \"  Current as of: February 10, 2021               Content Version: 13.0  © 3634-2896 [employer]. Care instructions adapted under license by [employer] (which disclaims liability or warranty for this information). If you have questions about a medical condition or this instruction, always ask your healthcare professional. [place] [address] any warranty or liability for your use of this information. Learning About Asthma Triggers  What are asthma triggers? When you have asthma, some things can make your symptoms worse. These things are called triggers. Things that you're allergic to can trigger your asthma. These may include dust or dust mites, cockroach droppings, pet dander, or mold. Other things can also trigger your asthma, like smoke, colds or the flu, or air pollution. How do asthma triggers affect you? Triggers can make it harder for your lungs to work as they should. They can lead to sudden breathing problems and other symptoms. When you are around a trigger, an asthma attack is more likely. If your symptoms are severe, you may need emergency treatment or have to go to the hospital for treatment. What can you do to avoid triggers? The best way to avoid your asthma triggers is to know what your triggers are. When you are having symptoms, note the things around you that might be causing them. Then look for patterns that may be triggering your symptoms. Record your triggers on a piece of paper or in an asthma diary. When you have your list of possible triggers, work with your doctor to find ways to avoid them.   Here are some ways to avoid a few common triggers. · Don't smoke or allow others to smoke around you. If you need help quitting, talk to your doctor about stop-smoking programs and medicines. These can increase your chances of quitting for good. · If there is a lot of pollution, pollen, or dust outside, stay at home and keep your windows closed. Use an air conditioner or air filter in your home. Check your local weather report or newspaper for air quality and pollen reports. · Avoid colds and flu. Get a flu vaccine every year, as soon as it's available. If you must be around people with colds or the flu, wash your hands often. · Talk to your doctor about getting a pneumococcal vaccine shot. If you have had one before, ask your doctor if you need a second dose. To help prevent problems before they occur, take your controller medicine every day, not only when you have symptoms. Where can you learn more? Go to http://www.wilcox.com/  Enter S900 in the search box to learn more about \"Learning About Asthma Triggers. \"  Current as of: July 6, 2021               Content Version: 13.0  © 0039-4509 [employer]. Care instructions adapted under license by [employer] (which disclaims liability or warranty for this information). If you have questions about a medical condition or this instruction, always ask your healthcare professional. [address] any warranty or liability for your use of this information. Smoke Inhalation: Care Instructions  Your Care Instructions     Breathing in hot air, smoke, or chemical fumes can cause irritation or swelling in your air passages. Being in or near a fire can cause wheezing and breathing problems. You may not notice these problems until several hours later. Inhaling smoke or other irritants can also poison your body. This is more likely if plastics or synthetic materials were burned.   You probably had a blood test and other tests that measured how your lungs were working. You may have had a blood gas test to measure the amount of oxygen, carbon dioxide, carbon monoxide, and acid in your blood. Your doctor may have given you oxygen through a mask to help you breathe. You may have a cough, shortness of breath, and pain while you heal. If you inhaled soot, you may cough up gray or black mucus. Follow-up care is a key part of your treatment and safety. Be sure to make and go to all appointments, and call your doctor if you are having problems. It's also a good idea to know your test results and keep a list of the medicines you take. How can you care for yourself at home? · Get plenty of rest and sleep. You may feel weak and tired for a while, but your energy level will improve with time. Prop up your head on pillows to help you breathe and ease a cough. · Suck on cough drops or hard candy to soothe a dry or sore throat. Cough drops do not stop a cough. · Take cough medicine if your doctor tells you to. · Do not smoke or allow others to smoke around you. If you need help quitting, talk to your doctor about stop-smoking programs and medicines. These can increase your chances of quitting for good. · Avoid things that may irritate your lungs. This might include cold, dry air or hot, humid air. · If your doctor prescribes antibiotics, take them as directed. Do not stop taking them just because you feel better. You need to take the full course of antibiotics. · Take your medicines exactly as prescribed. Call your doctor if you think you are having a problem with your medicine. You will get more details on the specific medicines your doctor prescribes. · If your doctor prescribed medicine to make breathing easier, such as a bronchodilator or inhaled corticosteroid, use it exactly as directed. · If you were given a spirometer to measure how well your lungs are working, use it as instructed. This can help your doctor tell how your recovery is going.   When should you call for help? Call 911 anytime you think you may need emergency care. For example, call if:    · You have trouble breathing. Call your doctor now or seek immediate medical care if:    · You cough up yellow, dark brown, or bloody mucus.     · Your coughing or wheezing gets worse. Watch closely for changes in your health, and be sure to contact your doctor if:    · You do not get better as expected. Where can you learn more? Go to http://www.gray.com/  Enter H258 in the search box to learn more about \"Smoke Inhalation: Care Instructions. \"  Current as of: February 18, 2021               Content Version: 13.0  © 8279-0684 [employer]. Care instructions adapted under license by [employer] (which disclaims liability or warranty for this information). If you have questions about a medical condition or this instruction, always ask your healthcare professional. [place] [address] any warranty or liability for your use of this information. Vaccine Information Statement    Influenza (Flu) Vaccine (Inactivated or Recombinant): What You Need to Know    Many vaccine information statements are available in [place] and other languages. See www.immunize.org/vis. Hojas de información sobre vacunas están disponibles en español y en muchos otros idiomas. Visite www.immunize.org/vis. 1. Why get vaccinated? Influenza vaccine can prevent influenza (flu). Flu is a contagious disease that spreads around the [place] every year, usually between October and May. Anyone can get the flu, but it is more dangerous for some people. Infants and young children, people 72 years and older, pregnant people, and people with certain health conditions or a weakened immune system are at greatest risk of flu complications. Pneumonia, bronchitis, sinus infections, and ear infections are examples of flu-related complications.  If you have a medical condition, such as heart disease, cancer, or diabetes, flu can make it worse. Flu can cause fever and chills, sore throat, muscle aches, fatigue, cough, headache, and runny or stuffy nose. Some people may have vomiting and diarrhea, though this is more common in children than adults. In an average year, thousands of people in the [facility] die from flu, and many more are hospitalized. Flu vaccine prevents millions of illnesses and flu-related visits to the doctor each year. 2. Influenza vaccines     CDC recommends everyone 6 months and older get vaccinated every flu season. Children 6 months through 6years of age may need 2 doses during a single flu season. Everyone else needs only 1 dose each flu season. It takes about 2 weeks for protection to develop after vaccination. There are many flu viruses, and they are always changing. Each year a new flu vaccine is made to protect against the influenza viruses believed to be likely to cause disease in the upcoming flu season. Even when the vaccine doesnt exactly match these viruses, it may still provide some protection. Influenza vaccine does not cause flu. Influenza vaccine may be given at the same time as other vaccines. 3. Talk with your health care provider    Tell your vaccination provider if the person getting the vaccine:   Has had an allergic reaction after a previous dose of influenza vaccine, or has any severe, life-threatening allergies    Has ever had Guillain-Barré Syndrome (also called GBS)    In some cases, your health care provider may decide to postpone influenza vaccination until a future visit. Influenza vaccine can be administered at any time during pregnancy. People who are or will be pregnant during influenza season should receive inactivated influenza vaccine. People with minor illnesses, such as a cold, may be vaccinated.  People who are moderately or severely ill should usually wait until they recover before getting influenza vaccine. Your health care provider can give you more information. 4. Risks of a vaccine reaction     Soreness, redness, and swelling where the shot is given, fever, muscle aches, and headache can happen after influenza vaccination.  There may be a very small increased risk of Guillain-Barré Syndrome (GBS) after inactivated influenza vaccine (the flu shot). Raydell Jurist children who get the flu shot along with pneumococcal vaccine (PCV13) and/or DTaP vaccine at the same time might be slightly more likely to have a seizure caused by fever. Tell your health care provider if a child who is getting flu vaccine has ever had a seizure. People sometimes faint after medical procedures, including vaccination. Tell your provider if you feel dizzy or have vision changes or ringing in the ears. As with any medicine, there is a very remote chance of a vaccine causing a severe allergic reaction, other serious injury, or death. 5. What if there is a serious problem? An allergic reaction could occur after the vaccinated person leaves the clinic. If you see signs of a severe allergic reaction (hives, swelling of the face and throat, difficulty breathing, a fast heartbeat, dizziness, or weakness), call 9-1-1 and get the person to the nearest hospital.    For other signs that concern you, call your health care provider. Adverse reactions should be reported to the Vaccine Adverse Event Reporting System (VAERS). Your health care provider will usually file this report, or you can do it yourself. Visit the VAERS website at www.vaers. hhs.gov or call 3-317.167.7726. VAERS is only for reporting reactions, and VAERS staff members do not give medical advice. 6. The National Vaccine Injury Compensation Program    The [facility] Vaccine Injury Compensation Program (VICP) is a federal program that was created to compensate people who may have been injured by certain vaccines.  Claims regarding alleged injury or death due to vaccination have a time limit for filing, which may be as short as two years. Visit the VICP website at www.hrsa.gov/vaccinecompensation or call 9-467.284.2071 to learn about the program and about filing a claim. 7. How can I learn more?  Ask your health care provider.  Call your local or state health department.  Visit the website of the Food and Drug Administration (FDA) for vaccine package inserts and additional information at www.fda.gov/vaccines-blood-biologics/vaccines.  Contact the Centers for Disease Control and Prevention (CDC):  - Call 5-412.763.5133 (1-800-CDC-INFO) or  - Visit CDCs influenza website at www.cdc.gov/flu. Vaccine Information Statement   Inactivated Influenza Vaccine   8/6/2021  [id number]   Department of Health and Human Services  Centers for Disease Control and Prevention    Office Use Only

## 2021-10-07 NOTE — LETTER
10/7/2021    Patient: Nam Anderson   YOB: 1980   Date of Visit: 10/7/2021     Deshawn Zamora MD  [address]  [address]  [place] [address]  Via In Basket    Dear Deshawn Zamora MD,      Thank you for referring Mr. Dk Oglesby to [address] [place] [address] for evaluation. My notes for this consultation are attached. If you have questions, please do not hesitate to call me. I look forward to following your patient along with you.       Sincerely,    Dawit Smith MD

## 2022-03-10 ENCOUNTER — TELEPHONE (OUTPATIENT)
Dept: PULMONOLOGY | Age: 42
End: 2022-03-10

## 2022-03-10 NOTE — TELEPHONE ENCOUNTER
Pt calling asking to speak with nurse re medications. Pt is due for follow up. Offered him openings for tomorrow. Pt stated that he could not make it. No other openings until May. Will call when May calendar opens.  Please advise [phone]

## 2022-03-18 PROBLEM — R22.32 SUBCUTANEOUS MASS OF LEFT HAND: Status: ACTIVE | Noted: 2021-10-01

## 2022-03-18 PROBLEM — F12.90 MARIJUANA SMOKER: Status: ACTIVE | Noted: 2021-10-01

## 2022-03-18 PROBLEM — F17.210 CIGARETTE NICOTINE DEPENDENCE: Status: ACTIVE | Noted: 2021-10-01

## 2022-03-19 PROBLEM — R03.0 ELEVATED BP WITHOUT DIAGNOSIS OF HYPERTENSION: Status: ACTIVE | Noted: 2021-10-01

## 2022-03-19 PROBLEM — R45.89 DEPRESSED MOOD: Status: ACTIVE | Noted: 2021-10-01

## 2022-03-19 PROBLEM — M79.642 PAIN OF LEFT HAND: Status: ACTIVE | Noted: 2017-08-02

## 2022-03-19 PROBLEM — F90.9 HYPERACTIVE: Status: ACTIVE | Noted: 2018-06-26

## 2022-03-19 PROBLEM — K21.9 GERD (GASTROESOPHAGEAL REFLUX DISEASE): Status: ACTIVE | Noted: 2021-10-01

## 2022-03-19 PROBLEM — Q82.8 PLANTAR KERATOSIS: Status: ACTIVE | Noted: 2021-10-01

## 2022-03-19 PROBLEM — Z78.9 ALCOHOL USE: Status: ACTIVE | Noted: 2021-10-01

## 2022-03-19 PROBLEM — R59.0 CERVICAL LYMPHADENOPATHY: Status: ACTIVE | Noted: 2021-10-01

## 2022-03-19 PROBLEM — J44.9 CHRONIC OBSTRUCTIVE PULMONARY DISEASE (HCC): Status: ACTIVE | Noted: 2018-06-26

## 2022-03-19 PROBLEM — R59.1 LYMPHADENOPATHY: Status: ACTIVE | Noted: 2021-10-01

## 2022-03-19 PROBLEM — G57.60 PLANTAR NEUROMA: Status: ACTIVE | Noted: 2021-10-01

## 2022-03-19 PROBLEM — R45.851 SUICIDAL IDEATION: Status: ACTIVE | Noted: 2021-10-01

## 2022-03-19 PROBLEM — F41.9 ANXIETY DISORDER: Status: ACTIVE | Noted: 2021-10-01

## 2022-03-19 PROBLEM — Z91.89 COMPLIANCE WITH MEDICATION REGIMEN: Status: ACTIVE | Noted: 2018-06-26

## 2022-03-20 PROBLEM — S99.921A: Status: ACTIVE | Noted: 2018-06-26

## 2022-07-27 DIAGNOSIS — J45.50 POORLY CONTROLLED SEVERE PERSISTENT ASTHMA WITHOUT COMPLICATION: ICD-10-CM

## 2022-07-27 RX ORDER — ALBUTEROL SULFATE 90 UG/1
AEROSOL, METERED RESPIRATORY (INHALATION)
Qty: 27 G | Refills: 0 | Status: SHIPPED | OUTPATIENT
Start: 2022-07-27 | End: 2022-08-26

## 2022-08-26 DIAGNOSIS — J45.50 POORLY CONTROLLED SEVERE PERSISTENT ASTHMA WITHOUT COMPLICATION: ICD-10-CM

## 2022-08-26 RX ORDER — ALBUTEROL SULFATE 90 UG/1
AEROSOL, METERED RESPIRATORY (INHALATION)
Qty: 27 G | Refills: 0 | Status: SHIPPED | OUTPATIENT
Start: 2022-08-26

## 2022-11-29 DIAGNOSIS — J30.89 ENVIRONMENTAL AND SEASONAL ALLERGIES: ICD-10-CM

## 2022-11-29 DIAGNOSIS — J45.50 POORLY CONTROLLED SEVERE PERSISTENT ASTHMA WITHOUT COMPLICATION: ICD-10-CM

## 2022-11-29 RX ORDER — IPRATROPIUM BROMIDE AND ALBUTEROL SULFATE 2.5; .5 MG/3ML; MG/3ML
3 SOLUTION RESPIRATORY (INHALATION)
Qty: 30 EACH | Refills: 0 | Status: SHIPPED | OUTPATIENT
Start: 2022-11-29

## 2022-11-29 RX ORDER — BUDESONIDE AND FORMOTEROL FUMARATE DIHYDRATE 160; 4.5 UG/1; UG/1
2 AEROSOL RESPIRATORY (INHALATION) 2 TIMES DAILY
Qty: 1 EACH | Refills: 0 | Status: SHIPPED | OUTPATIENT
Start: 2022-11-29

## 2022-11-29 RX ORDER — TIOTROPIUM BROMIDE 18 UG/1
1 CAPSULE ORAL; RESPIRATORY (INHALATION) DAILY
Qty: 30 CAPSULE | Refills: 0 | Status: SHIPPED | OUTPATIENT
Start: 2022-11-29

## 2022-11-29 RX ORDER — ALBUTEROL SULFATE 90 UG/1
AEROSOL, METERED RESPIRATORY (INHALATION)
Qty: 1 EACH | Refills: 0 | Status: SHIPPED | OUTPATIENT
Start: 2022-11-29

## 2022-11-29 RX ORDER — AZELASTINE 1 MG/ML
1 SPRAY, METERED NASAL 2 TIMES DAILY
Qty: 1 EACH | Refills: 0 | Status: SHIPPED | OUTPATIENT
Start: 2022-11-29

## 2022-11-29 NOTE — TELEPHONE ENCOUNTER
Pt requesting refills on all of  his meds-albuterol inhaler, duoneb, symbicort, asmanex, and spiriva and astelin sent to [facility].     Apt made for 12/28

## 2023-01-09 DIAGNOSIS — J45.50 POORLY CONTROLLED SEVERE PERSISTENT ASTHMA WITHOUT COMPLICATION: ICD-10-CM

## 2023-01-09 DIAGNOSIS — J30.89 ENVIRONMENTAL AND SEASONAL ALLERGIES: ICD-10-CM

## 2023-01-09 RX ORDER — BUDESONIDE AND FORMOTEROL FUMARATE DIHYDRATE 160; 4.5 UG/1; UG/1
2 AEROSOL RESPIRATORY (INHALATION) 2 TIMES DAILY
Qty: 1 EACH | Refills: 5 | Status: SHIPPED | OUTPATIENT
Start: 2023-01-09

## 2023-01-09 RX ORDER — ALBUTEROL SULFATE 90 UG/1
AEROSOL, METERED RESPIRATORY (INHALATION)
Qty: 27 G | Refills: 0 | OUTPATIENT
Start: 2023-01-09

## 2023-01-09 RX ORDER — ALBUTEROL SULFATE 90 UG/1
AEROSOL, METERED RESPIRATORY (INHALATION)
Qty: 1 EACH | Refills: 3 | Status: SHIPPED | OUTPATIENT
Start: 2023-01-09

## 2023-01-09 RX ORDER — TIOTROPIUM BROMIDE 18 UG/1
1 CAPSULE ORAL; RESPIRATORY (INHALATION) DAILY
Qty: 30 CAPSULE | Refills: 5 | Status: SHIPPED | OUTPATIENT
Start: 2023-01-09

## 2023-01-09 RX ORDER — TIOTROPIUM BROMIDE 18 UG/1
CAPSULE ORAL; RESPIRATORY (INHALATION)
Qty: 30 CAPSULE | Refills: 0 | OUTPATIENT
Start: 2023-01-09

## 2023-01-09 RX ORDER — IPRATROPIUM BROMIDE AND ALBUTEROL SULFATE 2.5; .5 MG/3ML; MG/3ML
3 SOLUTION RESPIRATORY (INHALATION)
Qty: 30 EACH | Refills: 3 | Status: SHIPPED | OUTPATIENT
Start: 2023-01-09

## 2023-01-09 RX ORDER — ALBUTEROL SULFATE 90 UG/1
AEROSOL, METERED RESPIRATORY (INHALATION)
Qty: 9 G | Refills: 0 | Status: CANCELLED | OUTPATIENT
Start: 2023-01-09

## 2023-01-09 NOTE — TELEPHONE ENCOUNTER
Patient requesting Rx refill  Pt sched appt 1/25/23 and 2/6/23    Spiriva with HandiHaler 18 mcg inhalation capsule     albuterol-ipratropium (DUO-NEB) 2.5 mg-0.5 mg/3 ml nebu    budesonide-formoteroL (SYMBICORT) 160-4.5 mcg/actuation HFAA

## 2023-01-11 DIAGNOSIS — J45.50 POORLY CONTROLLED SEVERE PERSISTENT ASTHMA WITHOUT COMPLICATION: ICD-10-CM

## 2023-01-12 RX ORDER — MOMETASONE FUROATE 220 UG/1
INHALANT RESPIRATORY (INHALATION)
Qty: 1 EACH | Refills: 0 | Status: SHIPPED | OUTPATIENT
Start: 2023-01-12

## 2023-05-18 ENCOUNTER — HOSPITAL ENCOUNTER (OUTPATIENT)
Facility: HOSPITAL | Age: 43
Discharge: HOME | End: 2023-05-18
Payer: COMMERCIAL

## 2023-05-18 DIAGNOSIS — J44.9 OBSTRUCTIVE CHRONIC BRONCHITIS WITHOUT EXACERBATION (HCC): ICD-10-CM

## 2023-05-18 PROCEDURE — 71046 X-RAY EXAM CHEST 2 VIEWS: CPT

## 2023-07-12 ENCOUNTER — HOSPITAL ENCOUNTER (OUTPATIENT)
Facility: HOSPITAL | Age: 43
Discharge: HOME | End: 2023-07-15
Attending: OTOLARYNGOLOGY
Payer: COMMERCIAL

## 2023-07-12 DIAGNOSIS — R59.1 GENERALIZED ENLARGED LYMPH NODES: ICD-10-CM

## 2023-07-12 PROCEDURE — 70491 CT SOFT TISSUE NECK W/DYE: CPT

## 2023-07-12 PROCEDURE — 6360000004 HC RX CONTRAST MEDICATION: Performed by: OTOLARYNGOLOGY

## 2023-07-12 RX ADMIN — IOPAMIDOL 80 ML: 612 INJECTION, SOLUTION INTRAVENOUS at 15:46

## 2023-09-06 RX ORDER — ALBUTEROL SULFATE 90 UG/1
AEROSOL, METERED RESPIRATORY (INHALATION)
Qty: 9 G | Refills: 3 | Status: SHIPPED | OUTPATIENT
Start: 2023-09-06

## 2023-11-15 RX ORDER — TIOTROPIUM BROMIDE 18 UG/1
CAPSULE ORAL; RESPIRATORY (INHALATION)
Qty: 30 CAPSULE | Refills: 5 | OUTPATIENT
Start: 2023-11-15

## 2023-11-20 RX ORDER — TIOTROPIUM BROMIDE 18 UG/1
18 CAPSULE ORAL; RESPIRATORY (INHALATION) DAILY
Qty: 30 CAPSULE | Refills: 1 | Status: SHIPPED | OUTPATIENT
Start: 2023-11-20

## 2023-11-20 RX ORDER — TIOTROPIUM BROMIDE 18 UG/1
CAPSULE ORAL; RESPIRATORY (INHALATION)
Qty: 30 CAPSULE | Refills: 0 | OUTPATIENT
Start: 2023-11-20

## 2023-11-20 NOTE — TELEPHONE ENCOUNTER
Pt would like to refill sprivia and would like to speak with nurse in regards to this medication. Pt does have an appt with our office 12/26 for pft and to see the HP on 02/8.  Pt is requesting callback [phone] pt is worried

## 2024-01-08 ENCOUNTER — PROCEDURE VISIT (OUTPATIENT)
Age: 44
End: 2024-01-08
Payer: MEDICAID

## 2024-01-08 VITALS — HEIGHT: 71 IN | WEIGHT: 146 LBS | BODY MASS INDEX: 20.44 KG/M2

## 2024-01-08 DIAGNOSIS — J44.9 CHRONIC OBSTRUCTIVE PULMONARY DISEASE, UNSPECIFIED COPD TYPE (HCC): Primary | ICD-10-CM

## 2024-01-08 PROCEDURE — 94727 GAS DIL/WSHOT DETER LNG VOL: CPT | Performed by: INTERNAL MEDICINE

## 2024-01-08 PROCEDURE — 94060 EVALUATION OF WHEEZING: CPT | Performed by: INTERNAL MEDICINE

## 2024-01-08 PROCEDURE — 94729 DIFFUSING CAPACITY: CPT | Performed by: INTERNAL MEDICINE

## 2024-01-16 PROBLEM — J30.2 PERENNIAL ALLERGIC RHINITIS WITH SEASONAL VARIATION: Status: ACTIVE | Noted: 2022-03-04

## 2024-01-16 PROBLEM — J30.89 PERENNIAL ALLERGIC RHINITIS WITH SEASONAL VARIATION: Status: ACTIVE | Noted: 2022-03-04

## 2024-01-16 PROBLEM — J30.1 ALLERGIC RHINITIS DUE TO POLLEN: Status: ACTIVE | Noted: 2022-03-04

## 2024-01-19 RX ORDER — TIOTROPIUM BROMIDE 18 UG/1
CAPSULE ORAL; RESPIRATORY (INHALATION)
Qty: 30 CAPSULE | Refills: 0 | OUTPATIENT
Start: 2024-01-19

## 2024-02-08 ENCOUNTER — CLINICAL DOCUMENTATION (OUTPATIENT)
Age: 44
End: 2024-02-08

## 2024-02-08 NOTE — PROGRESS NOTES
Pt came into the office over 15 minutes late to an appointment with Dr. NADIA Siddiqui.  The pt was told that we would need to reschedule d/t him being late.  Pt then got irrate and started using foul language stating his apt was at 3:30 and he was only 5 minutes late.  Pt was told unfortunately his apt was at 3:15 and that we would need to reschedule.  He then started using quite a bit of foul language and got extremely loud, causing other patients in the lobby to be scared.  Pt was asked to please stop using that kind of language.  Pt continued to be irrate and yell at myself and the  PSR.  Pt was then asked to leave the premises.  Pt then cursed again and stated he wasn't coming back and left the office.  Per provider,  pt to be dismissed from the practice.

## 2024-03-21 RX ORDER — BUDESONIDE AND FORMOTEROL FUMARATE DIHYDRATE 160; 4.5 UG/1; UG/1
AEROSOL RESPIRATORY (INHALATION)
Qty: 33 G | Refills: 0 | OUTPATIENT
Start: 2024-03-21